# Patient Record
Sex: MALE | Race: WHITE | NOT HISPANIC OR LATINO | Employment: OTHER | URBAN - METROPOLITAN AREA
[De-identification: names, ages, dates, MRNs, and addresses within clinical notes are randomized per-mention and may not be internally consistent; named-entity substitution may affect disease eponyms.]

---

## 2018-01-29 ENCOUNTER — OFFICE VISIT (OUTPATIENT)
Dept: FAMILY MEDICINE | Facility: OTHER | Age: 83
End: 2018-01-29
Attending: NURSE PRACTITIONER
Payer: MEDICARE

## 2018-01-29 VITALS
DIASTOLIC BLOOD PRESSURE: 80 MMHG | SYSTOLIC BLOOD PRESSURE: 138 MMHG | WEIGHT: 186 LBS | HEART RATE: 75 BPM | BODY MASS INDEX: 27.55 KG/M2 | HEIGHT: 69 IN | OXYGEN SATURATION: 99 % | TEMPERATURE: 98.2 F

## 2018-01-29 DIAGNOSIS — Z23 NEED FOR VACCINATION: ICD-10-CM

## 2018-01-29 DIAGNOSIS — N40.1 BENIGN PROSTATIC HYPERPLASIA WITH LOWER URINARY TRACT SYMPTOMS, SYMPTOM DETAILS UNSPECIFIED: Primary | ICD-10-CM

## 2018-01-29 DIAGNOSIS — L71.9 ROSACEA: ICD-10-CM

## 2018-01-29 DIAGNOSIS — J01.90 ACUTE SINUSITIS WITH SYMPTOMS GREATER THAN 10 DAYS: ICD-10-CM

## 2018-01-29 PROCEDURE — G0463 HOSPITAL OUTPT CLINIC VISIT: HCPCS | Mod: 25

## 2018-01-29 PROCEDURE — G0009 ADMIN PNEUMOCOCCAL VACCINE: HCPCS | Performed by: NURSE PRACTITIONER

## 2018-01-29 PROCEDURE — 90670 PCV13 VACCINE IM: CPT | Performed by: NURSE PRACTITIONER

## 2018-01-29 PROCEDURE — G0463 HOSPITAL OUTPT CLINIC VISIT: HCPCS

## 2018-01-29 PROCEDURE — 99204 OFFICE O/P NEW MOD 45 MIN: CPT | Performed by: NURSE PRACTITIONER

## 2018-01-29 PROCEDURE — 90471 IMMUNIZATION ADMIN: CPT | Performed by: NURSE PRACTITIONER

## 2018-01-29 RX ORDER — TRIAMCINOLONE ACETONIDE 55 UG/1
2 SPRAY, METERED NASAL DAILY
Qty: 1 BOTTLE | Refills: 3 | Status: SHIPPED | OUTPATIENT
Start: 2018-01-29 | End: 2018-07-06

## 2018-01-29 RX ORDER — FINASTERIDE 5 MG/1
5 TABLET, FILM COATED ORAL
COMMUNITY
Start: 2017-12-11 | End: 2018-07-12

## 2018-01-29 RX ORDER — DOXYCYCLINE 100 MG/1
100 CAPSULE ORAL
COMMUNITY
Start: 2017-09-18 | End: 2022-08-12

## 2018-01-29 RX ORDER — TRETINOIN 0.1 MG/G
GEL TOPICAL
COMMUNITY
Start: 2017-03-30 | End: 2022-10-27

## 2018-01-29 RX ORDER — TAMSULOSIN HYDROCHLORIDE 0.4 MG/1
0.4 CAPSULE ORAL
COMMUNITY
Start: 2017-12-11 | End: 2019-10-23

## 2018-01-29 RX ORDER — CLARITHROMYCIN 250 MG/1
250 TABLET, FILM COATED ORAL 2 TIMES DAILY
Qty: 20 TABLET | Refills: 0 | Status: SHIPPED | OUTPATIENT
Start: 2018-01-29 | End: 2018-02-08

## 2018-01-29 ASSESSMENT — ANXIETY QUESTIONNAIRES
5. BEING SO RESTLESS THAT IT IS HARD TO SIT STILL: NOT AT ALL
2. NOT BEING ABLE TO STOP OR CONTROL WORRYING: NOT AT ALL
GAD7 TOTAL SCORE: 0
1. FEELING NERVOUS, ANXIOUS, OR ON EDGE: NOT AT ALL
3. WORRYING TOO MUCH ABOUT DIFFERENT THINGS: NOT AT ALL
7. FEELING AFRAID AS IF SOMETHING AWFUL MIGHT HAPPEN: NOT AT ALL
6. BECOMING EASILY ANNOYED OR IRRITABLE: NOT AT ALL

## 2018-01-29 ASSESSMENT — PAIN SCALES - GENERAL: PAINLEVEL: NO PAIN (0)

## 2018-01-29 ASSESSMENT — PATIENT HEALTH QUESTIONNAIRE - PHQ9: 5. POOR APPETITE OR OVEREATING: NOT AT ALL

## 2018-01-29 NOTE — PATIENT INSTRUCTIONS
ASSESSMENT/PLAN:       1. Benign prostatic hyperplasia with lower urinary tract symptoms, symptom details unspecified  Continue following with Dr Gold    2. Rosacea  Continue following with dermatology    3. Acute sinusitis with symptoms greater than 10 days  symptomatic  - clarithromycin (BIAXIN) 250 MG tablet; Take 1 tablet (250 mg) by mouth 2 times daily for 10 days  Dispense: 20 tablet; Refill: 0  - triamcinolone (NASACORT AQ) 55 MCG/ACT Inhaler; Spray 2 sprays into both nostrils daily  Dispense: 1 Bottle; Refill: 3    prevnar 13 updated today    FUTURE APPOINTMENTS:       - Follow-up visit as needed    Eunice Dias, NP  [facility]

## 2018-01-29 NOTE — MR AVS SNAPSHOT
After Visit Summary   1/29/2018    Enzo Colin    MRN: [id number]           Patient Information     Date Of Birth          10/10/1934        Visit Information        Provider Department      1/29/2018 3:30 PM Eunice Dias NP [facility]        Today's Diagnoses     Benign prostatic hyperplasia with lower urinary tract symptoms, symptom details unspecified    -  1    Rosacea        Acute sinusitis with symptoms greater than 10 days          Care Instructions      ASSESSMENT/PLAN:       1. Benign prostatic hyperplasia with lower urinary tract symptoms, symptom details unspecified  Continue following with Dr Gold    2. Rosacea  Continue following with dermatology    3. Acute sinusitis with symptoms greater than 10 days  symptomatic  - clarithromycin (BIAXIN) 250 MG tablet; Take 1 tablet (250 mg) by mouth 2 times daily for 10 days  Dispense: 20 tablet; Refill: 0  - triamcinolone (NASACORT AQ) 55 MCG/ACT Inhaler; Spray 2 sprays into both nostrils daily  Dispense: 1 Bottle; Refill: 3    prevnar 13 updated today    FUTURE APPOINTMENTS:       - Follow-up visit as needed    Eunice Dias NP  [facility]            Follow-ups after your visit        Who to contact     If you have questions or need follow up information about today's clinic visit or your schedule please contact [facility] directly at [phone].  Normal or non-critical lab and imaging results will be communicated to you by MyChart, letter or phone within 4 business days after the clinic has received the results. If you do not hear from us within 7 days, please contact the clinic through MyChart or phone. If you have a critical or abnormal lab result, we will notify you by phone as soon as possible.  Submit refill requests through [employer] or call your pharmacy and they will forward the refill request to us. Please allow 3 business days for your refill to be completed.           "Additional Information About Your Visit        MyChart Information     [employer] lets you send messages to your doctor, view your test results, renew your prescriptions, schedule appointments and more. To sign up, go to [email or web address] . Click on \"Log in\" on the left side of the screen, which will take you to the Welcome page. Then click on \"Sign up Now\" on the right side of the page.     You will be asked to enter the access code listed below, as well as some personal information. Please follow the directions to create your username and password.     Your access code is: [email or web address]  Expires: 2018  3:48 PM     Your access code will  in 90 days. If you need help or a new code, please call your [place] clinic or [phone].        Care EveryWhere ID     This is your Care EveryWhere ID. This could be used by other organizations to access your [place] medical records  [id number]        Your Vitals Were     Pulse Temperature Height Pulse Oximetry BMI (Body Mass Index)       75 98.2  F (36.8  C) (Tympanic) 5' 9\" (1.753 m) 99% 27.47 kg/m2        Blood Pressure from Last 3 Encounters:   18 138/80   14 142/80    Weight from Last 3 Encounters:   18 186 lb (84.4 kg)   14 187 lb (84.8 kg)              Today, you had the following     No orders found for display         Today's Medication Changes          These changes are accurate as of 18  3:48 PM.  If you have any questions, ask your nurse or doctor.               Start taking these medicines.        Dose/Directions    clarithromycin 250 MG tablet   Commonly known as:  BIAXIN   Used for:  Acute sinusitis with symptoms greater than 10 days   Started by:  Eunice Dias NP        Dose:  250 mg   Take 1 tablet (250 mg) by mouth 2 times daily for 10 days   Quantity:  20 tablet   Refills:  0       triamcinolone 55 MCG/ACT Inhaler   Commonly known as:  NASACORT AQ   Used for:  Acute sinusitis with symptoms greater " than 10 days   Started by:  Eunice Dias, NP        Dose:  2 spray   Spray 2 sprays into both nostrils daily   Quantity:  1 Bottle   Refills:  3            Where to get your medicines      These medications were sent to [facility] - [place], [place] - [address]  [address] [address]     Phone:  [phone]     clarithromycin 250 MG tablet    triamcinolone 55 MCG/ACT Inhaler                Primary Care Provider    None Specified       No primary provider on file.        Equal Access to Services     [facility]: Hadii aad ku hadasho Soomaali, waaxda luqadaha, qaybta kaalmada adeegyada, waxay idiin hayaan soni worrell . So [facility] [phone].    ATENCIÓN: Si habla español, tiene a rajan disposición servicios gratuitos de asistencia lingüística. Llame al [phone].    We comply with applicable federal civil rights laws and [place] laws. We do not discriminate on the basis of race, color, national origin, age, disability, sex, sexual orientation, or gender identity.            Thank you!     Thank you for choosing [facility]  for your care. Our goal is always to provide you with excellent care. Hearing back from our patients is one way we can continue to improve our services. Please take a few minutes to complete the written survey that you may receive in the mail after your visit with us. Thank you!             Your Updated Medication List - Protect others around you: Learn how to safely use, store and throw away your medicines at www.disposemymeds.org.          This list is accurate as of 1/29/18  3:48 PM.  Always use your most recent med list.                   Brand Name Dispense Instructions for use Diagnosis    clarithromycin 250 MG tablet    BIAXIN    20 tablet    Take 1 tablet (250 mg) by mouth 2 times daily for 10 days    Acute sinusitis with symptoms greater than 10 days       doxycycline monohydrate 100 MG capsule      Take 100 mg by mouth        finasteride 5 MG  tablet    PROSCAR     Take 5 mg by mouth        METROCREAM 0.75 % cream   Generic drug:  metroNIDAZOLE      Apply by topical route 2 time every day a thin layer to the affected areas in the morning and evening        tamsulosin 0.4 MG capsule    FLOMAX     Take 0.4 mg by mouth        tretinoin 0.01 % topical gel    RETIN-A          triamcinolone 55 MCG/ACT Inhaler    NASACORT AQ    1 Bottle    Spray 2 sprays into both nostrils daily    Acute sinusitis with symptoms greater than 10 days       VITAMIN B-12 SL      Vitamin B Complex - Vitamin B12 1,200mcg/mL Sublingual Drops, take one po daily

## 2018-01-29 NOTE — PROGRESS NOTES
SUBJECTIVE:   Enzo Colin is a 83 year old male who presents to clinic today for the following health issues:      Acute Illness   Acute illness concerns: Sinus Infection  Onset: 3 weeks    Fever: no     Chills/Sweats: no     Headache (location?): YES- forehead and eyes    Sinus Pressure:YES-     Conjunctivitis:  no    Ear Pain: no    Rhinorrhea: YES but stopped 2 days ago.    Congestion: no     Sore Throat: no      Cough: YES-productive of clear sputum but lately not a productive cough    Wheeze: no     Decreased Appetite: no     Nausea: no     Vomiting: no     Diarrhea:  no     Dysuria/Freq.: no     Fatigue/Achiness: YES    Sick/Strep Exposure: no     Therapies Tried and outcome: aspirin, not helping. Helped the headache but didn't last long.    He has been taking doxy 100mg daily for skin issues.  He follows with Dermatology and urology - Dr Gold for prostate hypertrophy.  He does not have a PCP and requested we follow him.  He did bring records in the form of an after visit summary from [facility], these records are reviewed.      Problem list and histories reviewed & adjusted, as indicated.  Additional history: as documented    Patient Active Problem List   Diagnosis     Advanced care planning/counseling discussion     Past Surgical History:   Procedure Laterality Date     discectomy  1986     open reduction internal fixation  2012    Rotator cuff tear (right)     surgical  1996    Bladder cancer       Social History   Substance Use Topics     Smoking status: Former Smoker     Types: Cigarettes     Smokeless tobacco: Former User      Comment: tried to quit yes, yr quit 1977     Alcohol use No     Family History   Problem Relation Age of Onset     DIABETES No family hx of      Hypertension No family hx of      Coronary Artery Disease No family hx of          Current Outpatient Prescriptions   Medication Sig Dispense Refill     doxycycline monohydrate 100 MG capsule Take 100 mg by mouth       finasteride  "(PROSCAR) 5 MG tablet Take 5 mg by mouth       tamsulosin (FLOMAX) 0.4 MG capsule Take 0.4 mg by mouth       tretinoin (RETIN-A) 0.01 % topical gel        metroNIDAZOLE (METROCREAM) 0.75 % cream Apply by topical route 2 time every day a thin layer to the affected areas in the morning and evening       Cyanocobalamin (VITAMIN B-12 SL) Vitamin B Complex - Vitamin B12 1,200mcg/mL Sublingual Drops, take one po daily       Allergies   Allergen Reactions     Amoxicillin Palpitations     Clindamycin Rash     Sulfa Drugs Rash     Sulfa(Sulfonamide Antibiotics)       No lab results found.   BP Readings from Last 3 Encounters:   01/29/18 138/80   06/12/14 142/80    Wt Readings from Last 3 Encounters:   01/29/18 186 lb (84.4 kg)   06/12/14 187 lb (84.8 kg)               Reviewed and updated as needed this visit by clinical staff  Tobacco  Allergies  Meds  Med Hx  Surg Hx  Fam Hx  Soc Hx      Reviewed and updated as needed this visit by Provider         ROS:  Constitutional, HEENT, cardiovascular, pulmonary, gi and gu systems are negative, except as otherwise noted.    OBJECTIVE:     /80 (BP Location: Left arm, Patient Position: Chair, Cuff Size: Adult Large)  Pulse 75  Temp 98.2  F (36.8  C) (Tympanic)  Ht 5' 9\" (1.753 m)  Wt 186 lb (84.4 kg)  SpO2 99%  BMI 27.47 kg/m2  Body mass index is 27.47 kg/(m^2).  GENERAL: alert and no distress, appears younger than stated age  HENT: normal cephalic/atraumatic, both ears: dull, nose and mouth without ulcers or lesions, nasal mucosa edematous , rhinorrhea thick and purulent and oral mucous membranes moist, throat with thick post nasap drip with cobblestoning.    NECK: no adenopathy, no asymmetry, masses, or scars and thyroid normal to palpation  RESP: lungs clear to auscultation - no rales, rhonchi or wheezes  CV: regular rate and rhythm, normal S1 S2, no S3 or S4, no murmur, click or rub, no peripheral edema and peripheral pulses strong  MS: no gross musculoskeletal " defects noted, no edema  PSYCH: mentation appears normal, affect normal/bright        ASSESSMENT/PLAN:       1. Benign prostatic hyperplasia with lower urinary tract symptoms, symptom details unspecified  Continue following with Dr Gold    2. Rosacea  Continue following with dermatology    3. Acute sinusitis with symptoms greater than 10 days  symptomatic  - clarithromycin (BIAXIN) 250 MG tablet; Take 1 tablet (250 mg) by mouth 2 times daily for 10 days  Dispense: 20 tablet; Refill: 0  - triamcinolone (NASACORT AQ) 55 MCG/ACT Inhaler; Spray 2 sprays into both nostrils daily  Dispense: 1 Bottle; Refill: 3    prevnar 13 updated today    FUTURE APPOINTMENTS:       - Follow-up visit as needed    Eunice Dias, NP  [facility]

## 2018-01-29 NOTE — NURSING NOTE
"Chief Complaint   Patient presents with     Sinus Problem     possible sinus infection       Initial /80 (BP Location: Left arm, Patient Position: Chair, Cuff Size: Adult Large)  Pulse 75  Temp 98.2  F (36.8  C) (Tympanic)  Ht 5' 9\" (1.753 m)  Wt 186 lb (84.4 kg)  SpO2 99%  BMI 27.47 kg/m2 Estimated body mass index is 27.47 kg/(m^2) as calculated from the following:    Height as of this encounter: 5' 9\" (1.753 m).    Weight as of this encounter: 186 lb (84.4 kg).  Medication Reconciliation: complete   Kayce Valles LPN    "

## 2018-01-30 ASSESSMENT — ANXIETY QUESTIONNAIRES: GAD7 TOTAL SCORE: 0

## 2018-01-30 ASSESSMENT — PATIENT HEALTH QUESTIONNAIRE - PHQ9: SUM OF ALL RESPONSES TO PHQ QUESTIONS 1-9: 0

## 2018-04-11 ENCOUNTER — OFFICE VISIT (OUTPATIENT)
Dept: FAMILY MEDICINE | Facility: OTHER | Age: 83
End: 2018-04-11
Attending: NURSE PRACTITIONER
Payer: MEDICARE

## 2018-04-11 VITALS
RESPIRATION RATE: 18 BRPM | HEART RATE: 76 BPM | TEMPERATURE: 98.4 F | OXYGEN SATURATION: 99 % | HEIGHT: 69 IN | BODY MASS INDEX: 28.44 KG/M2 | WEIGHT: 192 LBS | SYSTOLIC BLOOD PRESSURE: 148 MMHG | DIASTOLIC BLOOD PRESSURE: 74 MMHG

## 2018-04-11 DIAGNOSIS — F41.9 ANXIETY: Primary | ICD-10-CM

## 2018-04-11 PROCEDURE — 99213 OFFICE O/P EST LOW 20 MIN: CPT | Performed by: NURSE PRACTITIONER

## 2018-04-11 PROCEDURE — G0463 HOSPITAL OUTPT CLINIC VISIT: HCPCS

## 2018-04-11 RX ORDER — LORAZEPAM 0.5 MG/1
TABLET ORAL
Qty: 6 TABLET | Refills: 0 | Status: SHIPPED | OUTPATIENT
Start: 2018-04-11 | End: 2018-06-07

## 2018-04-11 ASSESSMENT — ANXIETY QUESTIONNAIRES
1. FEELING NERVOUS, ANXIOUS, OR ON EDGE: NOT AT ALL
7. FEELING AFRAID AS IF SOMETHING AWFUL MIGHT HAPPEN: NOT AT ALL
GAD7 TOTAL SCORE: 0
2. NOT BEING ABLE TO STOP OR CONTROL WORRYING: NOT AT ALL
4. TROUBLE RELAXING: NOT AT ALL
IF YOU CHECKED OFF ANY PROBLEMS ON THIS QUESTIONNAIRE, HOW DIFFICULT HAVE THESE PROBLEMS MADE IT FOR YOU TO DO YOUR WORK, TAKE CARE OF THINGS AT HOME, OR GET ALONG WITH OTHER PEOPLE: NOT DIFFICULT AT ALL
5. BEING SO RESTLESS THAT IT IS HARD TO SIT STILL: NOT AT ALL
3. WORRYING TOO MUCH ABOUT DIFFERENT THINGS: NOT AT ALL
6. BECOMING EASILY ANNOYED OR IRRITABLE: NOT AT ALL

## 2018-04-11 ASSESSMENT — PAIN SCALES - GENERAL: PAINLEVEL: NO PAIN (0)

## 2018-04-11 NOTE — MR AVS SNAPSHOT
"              After Visit Summary   4/11/2018    Enzo Colin    MRN: [id number]           Patient Information     Date Of Birth          10/10/1934        Visit Information        Provider Department      4/11/2018 11:30 AM Eunice Dias NP [facility]        Today's Diagnoses     Anxiety    -  1      Care Instructions      ASSESSMENT/PLAN:       1. Anxiety  - LORazepam (ATIVAN) 0.5 MG tablet; Take one - two tablets as needed for anxiety Do not operate a vehicle after taking this medication  Dispense: 6 tablet; Refill: 0    FUTURE APPOINTMENTS:       - Follow-up visit as needed for acute concerns.     Eunice Dias NP  [facility]          Follow-ups after your visit        Who to contact     If you have questions or need follow up information about today's clinic visit or your schedule please contact [facility] directly at [phone].  Normal or non-critical lab and imaging results will be communicated to you by MyChart, letter or phone within 4 business days after the clinic has received the results. If you do not hear from us within 7 days, please contact the clinic through [employer] or phone. If you have a critical or abnormal lab result, we will notify you by phone as soon as possible.  Submit refill requests through [employer] or call your pharmacy and they will forward the refill request to us. Please allow 3 business days for your refill to be completed.          Additional Information About Your Visit        MyChart Information     [employer] lets you send messages to your doctor, view your test results, renew your prescriptions, schedule appointments and more. To sign up, go to [email or web address] . Click on \"Log in\" on the left side of the screen, which will take you to the Welcome page. Then click on \"Sign up Now\" on the right side of the page.     You will be asked to enter the access code listed below, as well as some personal information. " "Please follow the directions to create your username and password.     Your access code is: [email or web address]  Expires: 2018  4:48 PM     Your access code will  in 90 days. If you need help or a new code, please call your [place] clinic or [phone].        Care EveryWhere ID     This is your Care EveryWhere ID. This could be used by other organizations to access your [place] medical records  [id number]        Your Vitals Were     Pulse Temperature Respirations Height Pulse Oximetry BMI (Body Mass Index)    76 98.4  F (36.9  C) (Tympanic) 18 5' 9\" (1.753 m) 99% 28.35 kg/m2       Blood Pressure from Last 3 Encounters:   18 148/74   18 138/80   14 142/80    Weight from Last 3 Encounters:   18 192 lb (87.1 kg)   18 186 lb (84.4 kg)   14 187 lb (84.8 kg)              Today, you had the following     No orders found for display         Today's Medication Changes          These changes are accurate as of 18 11:48 AM.  If you have any questions, ask your nurse or doctor.               Start taking these medicines.        Dose/Directions    LORazepam 0.5 MG tablet   Commonly known as:  ATIVAN   Used for:  Anxiety   Started by:  Eunice Dias NP        Take one - two tablets as needed for anxiety Do not operate a vehicle after taking this medication   Quantity:  6 tablet   Refills:  0            Where to get your medicines      Some of these will need a paper prescription and others can be bought over the counter.  Ask your nurse if you have questions.     Bring a paper prescription for each of these medications     LORazepam 0.5 MG tablet                Primary Care Provider Office Phone # Fax #    Eunice Dias [phone] [address] [facility] [address]        Equal Access to Services     [facility]: Bibi espinoo Soomaali, waaxda luqadaha, qaybta kaalmabronson emmanuel, lesly shafer " lizcesiliatatyana clarkaan ah. So [facility] [phone].    ATENCIÓN: Si jazmin dos santos, tiene a rajan disposición servicios gratuitos de asistencia lingüística. Rach alcantara [phone].    We comply with applicable federal civil rights laws and [place] laws. We do not discriminate on the basis of race, color, national origin, age, disability, sex, sexual orientation, or gender identity.            Thank you!     Thank you for choosing [facility]  for your care. Our goal is always to provide you with excellent care. Hearing back from our patients is one way we can continue to improve our services. Please take a few minutes to complete the written survey that you may receive in the mail after your visit with us. Thank you!             Your Updated Medication List - Protect others around you: Learn how to safely use, store and throw away your medicines at www.disposemymeds.org.          This list is accurate as of 4/11/18 11:48 AM.  Always use your most recent med list.                   Brand Name Dispense Instructions for use Diagnosis    doxycycline monohydrate 100 MG capsule      Take 100 mg by mouth        finasteride 5 MG tablet    PROSCAR     Take 5 mg by mouth        LORazepam 0.5 MG tablet    ATIVAN    6 tablet    Take one - two tablets as needed for anxiety Do not operate a vehicle after taking this medication    Anxiety       METROCREAM 0.75 % cream   Generic drug:  metroNIDAZOLE      Apply by topical route 2 time every day a thin layer to the affected areas in the morning and evening        tamsulosin 0.4 MG capsule    FLOMAX     Take 0.4 mg by mouth        tretinoin 0.01 % topical gel    RETIN-A          triamcinolone 55 MCG/ACT Inhaler    NASACORT AQ    1 Bottle    Spray 2 sprays into both nostrils daily    Acute sinusitis with symptoms greater than 10 days       VITAMIN B-12 SL      Vitamin B Complex - Vitamin B12 1,200mcg/mL Sublingual Drops, take one po daily

## 2018-04-11 NOTE — NURSING NOTE
"Chief Complaint   Patient presents with     Anxiety       Initial /74 (BP Location: Right arm, Patient Position: Chair, Cuff Size: Adult Large)  Pulse 76  Temp 98.4  F (36.9  C) (Tympanic)  Resp 18  Ht 5' 9\" (1.753 m)  Wt 192 lb (87.1 kg)  SpO2 99%  BMI 28.35 kg/m2 Estimated body mass index is 28.35 kg/(m^2) as calculated from the following:    Height as of this encounter: 5' 9\" (1.753 m).    Weight as of this encounter: 192 lb (87.1 kg).  Medication Reconciliation: complete   Pamela M Lechevalier LPN      "

## 2018-04-11 NOTE — PATIENT INSTRUCTIONS
ASSESSMENT/PLAN:       1. Anxiety  - LORazepam (ATIVAN) 0.5 MG tablet; Take one - two tablets as needed for anxiety Do not operate a vehicle after taking this medication  Dispense: 6 tablet; Refill: 0    FUTURE APPOINTMENTS:       - Follow-up visit as needed for acute concerns.     Eunice Dias NP  [facility]

## 2018-04-11 NOTE — PROGRESS NOTES
SUBJECTIVE:   Enzo Colin is a 83 year old male who presents to clinic today for the following health issues:  Anxiety symptoms arising     Anxiety Follow-Up    Status since last visit: not exibiting now but has thought going to a [facility] event and is afraid of having a panic attack or episode     Other associated symptoms:lays in bed thinking about having and anxiety attack    Complicating factors:   Significant life event: Yes-  Going to an event    Current substance abuse: None  Depression symptoms: No  RUMA-7 SCORE 1/29/2018 4/11/2018   Total Score 0 0       RUMA-7    Amount of exercise or physical activity: 6-7 days/week for an average of 30-45 minutes    Problems taking medications regularly: No    Medication side effects: none    Diet: regular (no restrictions)      Problem list and histories reviewed & adjusted, as indicated.  Additional history: as documented    Patient Active Problem List   Diagnosis     Advanced care planning/counseling discussion     Benign prostatic hyperplasia with lower urinary tract symptoms, symptom details unspecified     Rosacea     Past Surgical History:   Procedure Laterality Date     discectomy  1986     open reduction internal fixation  2012    Rotator cuff tear (right)     surgical  1996    Bladder cancer       Social History   Substance Use Topics     Smoking status: Former Smoker     Types: Cigarettes     Smokeless tobacco: Former User      Comment: tried to quit yes, yr quit 1977     Alcohol use No     Family History   Problem Relation Age of Onset     DIABETES No family hx of      Hypertension No family hx of      Coronary Artery Disease No family hx of          Current Outpatient Prescriptions   Medication Sig Dispense Refill     doxycycline monohydrate 100 MG capsule Take 100 mg by mouth       finasteride (PROSCAR) 5 MG tablet Take 5 mg by mouth       tamsulosin (FLOMAX) 0.4 MG capsule Take 0.4 mg by mouth       tretinoin (RETIN-A) 0.01 % topical gel        triamcinolone  "(NASACORT AQ) 55 MCG/ACT Inhaler Spray 2 sprays into both nostrils daily 1 Bottle 3     metroNIDAZOLE (METROCREAM) 0.75 % cream Apply by topical route 2 time every day a thin layer to the affected areas in the morning and evening       Cyanocobalamin (VITAMIN B-12 SL) Vitamin B Complex - Vitamin B12 1,200mcg/mL Sublingual Drops, take one po daily       Allergies   Allergen Reactions     Amoxicillin Palpitations     Clindamycin Rash     Sulfa Drugs Rash     Sulfa(Sulfonamide Antibiotics)       No lab results found.   BP Readings from Last 3 Encounters:   04/11/18 148/74   01/29/18 138/80   06/12/14 142/80    Wt Readings from Last 3 Encounters:   04/11/18 192 lb (87.1 kg)   01/29/18 186 lb (84.4 kg)   06/12/14 187 lb (84.8 kg)                 Reviewed and updated as needed this visit by clinical staff  Tobacco  Allergies       Reviewed and updated as needed this visit by Provider         ROS:  Constitutional, HEENT, cardiovascular, pulmonary, gi and gu systems are negative, except as otherwise noted.    OBJECTIVE:     /74 (BP Location: Right arm, Patient Position: Chair, Cuff Size: Adult Large)  Pulse 76  Temp 98.4  F (36.9  C) (Tympanic)  Resp 18  Ht 5' 9\" (1.753 m)  Wt 192 lb (87.1 kg)  SpO2 99%  BMI 28.35 kg/m2  Body mass index is 28.35 kg/(m^2).  GENERAL: healthy, alert and no distress  RESP: lungs clear to auscultation - no rales, rhonchi or wheezes  CV: regular rate and rhythm, normal S1 S2, no S3 or S4, no murmur, click or rub, no peripheral edema and peripheral pulses strong  MS: no gross musculoskeletal defects noted, no edema  NEURO: Normal strength and tone, mentation intact and speech normal  PSYCH: mentation appears normal, affect normal/bright      ASSESSMENT/PLAN:       1. Anxiety  - LORazepam (ATIVAN) 0.5 MG tablet; Take one - two tablets as needed for anxiety Do not operate a vehicle after taking this medication  Dispense: 6 tablet; Refill: 0    FUTURE APPOINTMENTS:       - Follow-up " visit as needed for acute concerns.     Eunice Dias, NP  [facility]

## 2018-04-12 ASSESSMENT — ANXIETY QUESTIONNAIRES: GAD7 TOTAL SCORE: 0

## 2018-04-12 ASSESSMENT — PATIENT HEALTH QUESTIONNAIRE - PHQ9: SUM OF ALL RESPONSES TO PHQ QUESTIONS 1-9: 0

## 2018-06-07 ENCOUNTER — OFFICE VISIT (OUTPATIENT)
Dept: FAMILY MEDICINE | Facility: OTHER | Age: 83
End: 2018-06-07
Attending: NURSE PRACTITIONER
Payer: MEDICARE

## 2018-06-07 VITALS
DIASTOLIC BLOOD PRESSURE: 80 MMHG | HEART RATE: 74 BPM | BODY MASS INDEX: 28.14 KG/M2 | WEIGHT: 190 LBS | RESPIRATION RATE: 16 BRPM | OXYGEN SATURATION: 98 % | HEIGHT: 69 IN | SYSTOLIC BLOOD PRESSURE: 142 MMHG | TEMPERATURE: 98.3 F

## 2018-06-07 DIAGNOSIS — R93.1 ABNORMAL ECHOCARDIOGRAM: ICD-10-CM

## 2018-06-07 DIAGNOSIS — I10 BENIGN ESSENTIAL HYPERTENSION: ICD-10-CM

## 2018-06-07 DIAGNOSIS — R06.02 SHORTNESS OF BREATH: Primary | ICD-10-CM

## 2018-06-07 DIAGNOSIS — R01.1 HEART MURMUR: ICD-10-CM

## 2018-06-07 LAB
BASOPHILS # BLD AUTO: 0 10E9/L (ref 0–0.2)
BASOPHILS NFR BLD AUTO: 0.1 %
DIFFERENTIAL METHOD BLD: NORMAL
EOSINOPHIL # BLD AUTO: 0.1 10E9/L (ref 0–0.7)
EOSINOPHIL NFR BLD AUTO: 1.8 %
ERYTHROCYTE [DISTWIDTH] IN BLOOD BY AUTOMATED COUNT: 14.5 % (ref 10–15)
HCT VFR BLD AUTO: 43.2 % (ref 40–53)
HGB BLD-MCNC: 14.1 G/DL (ref 13.3–17.7)
LYMPHOCYTES # BLD AUTO: 1.6 10E9/L (ref 0.8–5.3)
LYMPHOCYTES NFR BLD AUTO: 21 %
MCH RBC QN AUTO: 30.1 PG (ref 26.5–33)
MCHC RBC AUTO-ENTMCNC: 32.6 G/DL (ref 31.5–36.5)
MCV RBC AUTO: 92 FL (ref 78–100)
MONOCYTES # BLD AUTO: 0.7 10E9/L (ref 0–1.3)
MONOCYTES NFR BLD AUTO: 9.4 %
NEUTROPHILS # BLD AUTO: 5.1 10E9/L (ref 1.6–8.3)
NEUTROPHILS NFR BLD AUTO: 67.7 %
PLATELET # BLD AUTO: 180 10E9/L (ref 150–450)
RBC # BLD AUTO: 4.68 10E12/L (ref 4.4–5.9)
WBC # BLD AUTO: 7.6 10E9/L (ref 4–11)

## 2018-06-07 PROCEDURE — G0463 HOSPITAL OUTPT CLINIC VISIT: HCPCS

## 2018-06-07 PROCEDURE — 36415 COLL VENOUS BLD VENIPUNCTURE: CPT | Mod: ZL | Performed by: NURSE PRACTITIONER

## 2018-06-07 PROCEDURE — 93010 ELECTROCARDIOGRAM REPORT: CPT | Performed by: INTERNAL MEDICINE

## 2018-06-07 PROCEDURE — 85025 COMPLETE CBC W/AUTO DIFF WBC: CPT | Mod: ZL | Performed by: NURSE PRACTITIONER

## 2018-06-07 PROCEDURE — G0463 HOSPITAL OUTPT CLINIC VISIT: HCPCS | Mod: 25

## 2018-06-07 PROCEDURE — 99214 OFFICE O/P EST MOD 30 MIN: CPT | Performed by: NURSE PRACTITIONER

## 2018-06-07 PROCEDURE — 93005 ELECTROCARDIOGRAM TRACING: CPT

## 2018-06-07 PROCEDURE — 80048 BASIC METABOLIC PNL TOTAL CA: CPT | Mod: ZL | Performed by: NURSE PRACTITIONER

## 2018-06-07 RX ORDER — LISINOPRIL 5 MG/1
5 TABLET ORAL DAILY
Qty: 90 TABLET | Refills: 1 | Status: SHIPPED | OUTPATIENT
Start: 2018-06-07 | End: 2018-07-12

## 2018-06-07 ASSESSMENT — ANXIETY QUESTIONNAIRES
3. WORRYING TOO MUCH ABOUT DIFFERENT THINGS: NOT AT ALL
7. FEELING AFRAID AS IF SOMETHING AWFUL MIGHT HAPPEN: NOT AT ALL
GAD7 TOTAL SCORE: 0
5. BEING SO RESTLESS THAT IT IS HARD TO SIT STILL: NOT AT ALL
6. BECOMING EASILY ANNOYED OR IRRITABLE: NOT AT ALL
1. FEELING NERVOUS, ANXIOUS, OR ON EDGE: NOT AT ALL
4. TROUBLE RELAXING: NOT AT ALL
2. NOT BEING ABLE TO STOP OR CONTROL WORRYING: NOT AT ALL

## 2018-06-07 ASSESSMENT — PAIN SCALES - GENERAL: PAINLEVEL: NO PAIN (0)

## 2018-06-07 NOTE — PROGRESS NOTES
SUBJECTIVE:   Enzo Colin is a 83 year old male who presents to clinic today for the following health issues:      Acute Illness   Acute illness concerns: SOB  Insomnia and cough  Onset: 3 weeks    Fever: no    Chills/Sweats: no    Headache (location?): YES    Sinus Pressure:YES    Conjunctivitis:  no    Ear Pain: no    Rhinorrhea: YES    Congestion: YES    Sore Throat: no     Cough: YES-productive of clear sputum    Wheeze: YES    Decreased Appetite: no    Nausea: no    Vomiting: no    Diarrhea:  no    Dysuria/Freq.: no    Fatigue/Achiness: YES  Due to insomnia concerns and frequesnt waking up at night (BPH), last night woke up and may have had SOB or could have been dreaming this , walked to the kitchen and felt better    Sick/Strep Exposure: no     Therapies Tried and outcome: allergy cough syrup, some help and ASA      His wife and daughter are concerned as he is more fatigued, increased shortness of breath with exertion.  Reports he has been more unsteady on his feet.      Problem list and histories reviewed & adjusted, as indicated.  Additional history: as documented    Patient Active Problem List   Diagnosis     Advanced care planning/counseling discussion     Benign prostatic hyperplasia with lower urinary tract symptoms, symptom details unspecified     Rosacea     Past Surgical History:   Procedure Laterality Date     discectomy  1986     open reduction internal fixation  2012    Rotator cuff tear (right)     surgical  1996    Bladder cancer       Social History   Substance Use Topics     Smoking status: Former Smoker     Types: Cigarettes     Smokeless tobacco: Former User      Comment: tried to quit yes, yr quit 1977     Alcohol use No     Family History   Problem Relation Age of Onset     DIABETES No family hx of      Hypertension No family hx of      Coronary Artery Disease No family hx of          Current Outpatient Prescriptions   Medication Sig Dispense Refill     Cyanocobalamin (VITAMIN B-12 SL)  "Vitamin B Complex - Vitamin B12 1,200mcg/mL Sublingual Drops, take one po daily       doxycycline monohydrate 100 MG capsule Take 100 mg by mouth       finasteride (PROSCAR) 5 MG tablet Take 5 mg by mouth       LORazepam (ATIVAN) 0.5 MG tablet Take one - two tablets as needed for anxiety Do not operate a vehicle after taking this medication (Patient not taking: Reported on 6/7/2018) 6 tablet 0     metroNIDAZOLE (METROCREAM) 0.75 % cream Apply by topical route 2 time every day a thin layer to the affected areas in the morning and evening       tamsulosin (FLOMAX) 0.4 MG capsule Take 0.4 mg by mouth       tretinoin (RETIN-A) 0.01 % topical gel        triamcinolone (NASACORT AQ) 55 MCG/ACT Inhaler Spray 2 sprays into both nostrils daily 1 Bottle 3     Allergies   Allergen Reactions     Amoxicillin Palpitations     Clindamycin Rash     Sulfa Drugs Rash     Sulfa(Sulfonamide Antibiotics)       No lab results found.   BP Readings from Last 3 Encounters:   06/07/18 142/80   04/11/18 148/74   01/29/18 138/80    Wt Readings from Last 3 Encounters:   06/07/18 190 lb (86.2 kg)   04/11/18 192 lb (87.1 kg)   01/29/18 186 lb (84.4 kg)                    Reviewed and updated as needed this visit by clinical staff  Tobacco  Allergies  Meds  Problems  Med Hx  Surg Hx  Fam Hx  Soc Hx        Reviewed and updated as needed this visit by Provider         ROS:  Constitutional, HEENT, cardiovascular, pulmonary, gi and gu systems are negative, except as otherwise noted.    OBJECTIVE:     /80 (BP Location: Right arm, Patient Position: Sitting, Cuff Size: Adult Large)  Pulse 74  Temp 98.3  F (36.8  C)  Resp 16  Ht 5' 9\" (1.753 m)  Wt 190 lb (86.2 kg)  SpO2 98%  BMI 28.06 kg/m2  Body mass index is 28.06 kg/(m^2).  GENERAL: healthy, alert and no distress  HENT: ear canals and TM's normal, nose and mouth without ulcers or lesions  NECK: no adenopathy, no asymmetry, masses, or scars and thyroid normal to palpation  RESP: " lungs clear to auscultation - no rales, rhonchi or wheezes  CV: regular rates and rhythm, normal S1 S2, no S3 or S4, grade 3/6 systolic murmur heard best over the aortic region, peripheral pulses strong and no peripheral edema  ABDOMEN: soft, nontender, no hepatosplenomegaly, no masses and bowel sounds normal  PSYCH: mentation appears normal, affect normal/bright    Results for orders placed or performed in visit on 06/07/18   CBC with platelets differential   Result Value Ref Range    WBC 7.6 4.0 - 11.0 10e9/L    RBC Count 4.68 4.4 - 5.9 10e12/L    Hemoglobin 14.1 13.3 - 17.7 g/dL    Hematocrit 43.2 40.0 - 53.0 %    MCV 92 78 - 100 fl    MCH 30.1 26.5 - 33.0 pg    MCHC 32.6 31.5 - 36.5 g/dL    RDW 14.5 10.0 - 15.0 %    Platelet Count 180 150 - 450 10e9/L    Diff Method Automated Method     % Neutrophils 67.7 %    % Lymphocytes 21.0 %    % Monocytes 9.4 %    % Eosinophils 1.8 %    % Basophils 0.1 %    Absolute Neutrophil 5.1 1.6 - 8.3 10e9/L    Absolute Lymphocytes 1.6 0.8 - 5.3 10e9/L    Absolute Monocytes 0.7 0.0 - 1.3 10e9/L    Absolute Eosinophils 0.1 0.0 - 0.7 10e9/L    Absolute Basophils 0.0 0.0 - 0.2 10e9/L          EKG Interpretation:      Interpreted by Eunice Dias     Symptoms at time of EKG: None   Rhythm: Normal sinus  and right bundle branch block  Rate: Normal  Axis: Left Axis Deviation  Ectopy: None  Conduction: Normal  ST Segments/ T Waves: No ST-T wave changes and No acute ischemic changes  Q Waves: None  Comparison to prior: No old EKG available    Clinical Impression: no acute ST changes noted.          ASSESSMENT/PLAN:       1. Shortness of breath  - CBC with platelets differential - normal  - Basic metabolic panel  - EKG 12-lead complete w/read - (Clinic Performed)  - NM Exercise stress test; Future  - Exercise Stress Echocardiogram    2. Benign essential hypertension  New onset  - lisinopril (PRINIVIL/ZESTRIL) 5 MG tablet; Take 1 tablet (5 mg) by mouth daily  Dispense: 90 tablet;  Refill: 1  - please return in one - two weeks for nurse only blood pressure recheck    3. Heart murmur  - Exercise Stress Echocardiogram    FUTURE APPOINTMENTS:       - Follow-up visit in 1-2 weeks    Eunice Dias NP  [facility]

## 2018-06-07 NOTE — MR AVS SNAPSHOT
After Visit Summary   6/7/2018    Enzo Colin    MRN: [id number]           Patient Information     Date Of Birth          10/10/1934        Visit Information        Provider Department      6/7/2018 4:15 PM Eunice Dias NP [facility]        Today's Diagnoses     Shortness of breath    -  1    Benign essential hypertension        Heart murmur          Care Instructions      ASSESSMENT/PLAN:       1. Shortness of breath  - CBC with platelets differential - normal  - Basic metabolic panel  - EKG 12-lead complete w/read - (Clinic Performed)  - NM Exercise stress test; Future  - Exercise Stress Echocardiogram    2. Benign essential hypertension  New onset  - lisinopril (PRINIVIL/ZESTRIL) 5 MG tablet; Take 1 tablet (5 mg) by mouth daily  Dispense: 90 tablet; Refill: 1  - please return in one - two weeks for nurse only blood pressure recheck    3. Heart murmur  - Exercise Stress Echocardiogram    FUTURE APPOINTMENTS:       - Follow-up visit in 1-2 weeks    Eunice Dias NP  [facility]            Follow-ups after your visit        Follow-up notes from your care team     Return if symptoms worsen or fail to improve.      Future tests that were ordered for you today     Open Future Orders        Priority Expected Expires Ordered    NM Exercise stress test Routine  6/7/2019 6/7/2018            Who to contact     If you have questions or need follow up information about today's clinic visit or your schedule please contact [facility] directly at [phone].  Normal or non-critical lab and imaging results will be communicated to you by MyChart, letter or phone within 4 business days after the clinic has received the results. If you do not hear from us within 7 days, please contact the clinic through MyChart or phone. If you have a critical or abnormal lab result, we will notify you by phone as soon as possible.  Submit refill requests through  "[facility] or call your pharmacy and they will forward the refill request to us. Please allow 3 business days for your refill to be completed.          Additional Information About Your Visit        Care EveryWhere ID     This is your Care EveryWhere ID. This could be used by other organizations to access your [facility] medical records  [id number]        Your Vitals Were     Pulse Temperature Respirations Height Pulse Oximetry BMI (Body Mass Index)    74 98.3  F (36.8  C) 16 5' 9\" (1.753 m) 98% 28.06 kg/m2       Blood Pressure from Last 3 Encounters:   06/07/18 142/80   04/11/18 148/74   01/29/18 138/80    Weight from Last 3 Encounters:   06/07/18 190 lb (86.2 kg)   04/11/18 192 lb (87.1 kg)   01/29/18 186 lb (84.4 kg)              We Performed the Following     Basic metabolic panel     CBC with platelets differential     EKG 12-lead complete w/read - (Clinic Performed)     Exercise Stress Echocardiogram          Today's Medication Changes          These changes are accurate as of 6/7/18  4:56 PM.  If you have any questions, ask your nurse or doctor.               Start taking these medicines.        Dose/Directions    lisinopril 5 MG tablet   Commonly known as:  PRINIVIL/ZESTRIL   Used for:  Benign essential hypertension   Started by:  Eunice Dias NP        Dose:  5 mg   Take 1 tablet (5 mg) by mouth daily   Quantity:  90 tablet   Refills:  1            Where to get your medicines      These medications were sent to [facility] - [place], [place] - [address]  [facility] [place] [address]     Phone:  [phone]     lisinopril 5 MG tablet                Primary Care Provider Office Phone # Fax #    Eunice Dias [phone] [facility] [address]        Equal Access to Services     ELIZABETH MENDIOLA AH: Bibi espinoo Soomaali, waaxda luqadaha, qaybta kaalmada adeegyada, waxay elvis de la cruz. So wa " [phone].    ATENCIÓN: Si jazmin dos santos, tiene a rajan disposición servicios gratuitos de asistencia lingüística. Rach alcantara [phone].    We comply with applicable federal civil rights laws and [place] laws. We do not discriminate on the basis of race, color, national origin, age, disability, sex, sexual orientation, or gender identity.            Thank you!     Thank you for choosing [facility]  for your care. Our goal is always to provide you with excellent care. Hearing back from our patients is one way we can continue to improve our services. Please take a few minutes to complete the written survey that you may receive in the mail after your visit with us. Thank you!             Your Updated Medication List - Protect others around you: Learn how to safely use, store and throw away your medicines at www.disposemymeds.org.          This list is accurate as of 6/7/18  4:56 PM.  Always use your most recent med list.                   Brand Name Dispense Instructions for use Diagnosis    doxycycline monohydrate 100 MG capsule      Take 100 mg by mouth        finasteride 5 MG tablet    PROSCAR     Take 5 mg by mouth        lisinopril 5 MG tablet    PRINIVIL/ZESTRIL    90 tablet    Take 1 tablet (5 mg) by mouth daily    Benign essential hypertension       LORazepam 0.5 MG tablet    ATIVAN    6 tablet    Take one - two tablets as needed for anxiety Do not operate a vehicle after taking this medication    Anxiety       METROCREAM 0.75 % cream   Generic drug:  metroNIDAZOLE      Apply by topical route 2 time every day a thin layer to the affected areas in the morning and evening        tamsulosin 0.4 MG capsule    FLOMAX     Take 0.4 mg by mouth        tretinoin 0.01 % topical gel    RETIN-A          triamcinolone 55 MCG/ACT Inhaler    NASACORT AQ    1 Bottle    Spray 2 sprays into both nostrils daily    Acute sinusitis with symptoms greater than 10 days       VITAMIN B-12 SL      Vitamin B Complex -  Vitamin B12 1,200mcg/mL Sublingual Drops, take one po daily

## 2018-06-07 NOTE — NURSING NOTE
"Chief Complaint   Patient presents with     Breathing Problem       Initial /80 (BP Location: Right arm, Patient Position: Sitting, Cuff Size: Adult Large)  Pulse 74  Temp 98.3  F (36.8  C)  Resp 16  Ht 5' 9\" (1.753 m)  Wt 190 lb (86.2 kg)  SpO2 98%  BMI 28.06 kg/m2 Estimated body mass index is 28.06 kg/(m^2) as calculated from the following:    Height as of this encounter: 5' 9\" (1.753 m).    Weight as of this encounter: 190 lb (86.2 kg).  Medication Reconciliation: complete    Emily Meadows LPN    "

## 2018-06-07 NOTE — PATIENT INSTRUCTIONS
ASSESSMENT/PLAN:       1. Shortness of breath  - CBC with platelets differential - normal  - Basic metabolic panel  - EKG 12-lead complete w/read - (Clinic Performed)  - NM Exercise stress test; Future  - Exercise Stress Echocardiogram    2. Benign essential hypertension  New onset  - lisinopril (PRINIVIL/ZESTRIL) 5 MG tablet; Take 1 tablet (5 mg) by mouth daily  Dispense: 90 tablet; Refill: 1  - please return in one - two weeks for nurse only blood pressure recheck    3. Heart murmur  - Exercise Stress Echocardiogram    FUTURE APPOINTMENTS:       - Follow-up visit in 1-2 weeks    Eunice Dias NP  [facility]

## 2018-06-08 ENCOUNTER — TELEPHONE (OUTPATIENT)
Dept: NUCLEAR MEDICINE | Facility: HOSPITAL | Age: 83
End: 2018-06-08

## 2018-06-08 LAB
ANION GAP SERPL CALCULATED.3IONS-SCNC: 6 MMOL/L (ref 3–14)
BUN SERPL-MCNC: 19 MG/DL (ref 7–30)
CALCIUM SERPL-MCNC: 8.8 MG/DL (ref 8.5–10.1)
CHLORIDE SERPL-SCNC: 107 MMOL/L (ref 94–109)
CO2 SERPL-SCNC: 27 MMOL/L (ref 20–32)
CREAT SERPL-MCNC: 1.26 MG/DL (ref 0.66–1.25)
GFR SERPL CREATININE-BSD FRML MDRD: 55 ML/MIN/1.7M2
GLUCOSE SERPL-MCNC: 115 MG/DL (ref 70–99)
POTASSIUM SERPL-SCNC: 4.1 MMOL/L (ref 3.4–5.3)
SODIUM SERPL-SCNC: 140 MMOL/L (ref 133–144)

## 2018-06-08 ASSESSMENT — PATIENT HEALTH QUESTIONNAIRE - PHQ9: SUM OF ALL RESPONSES TO PHQ QUESTIONS 1-9: 2

## 2018-06-08 ASSESSMENT — ANXIETY QUESTIONNAIRES: GAD7 TOTAL SCORE: 0

## 2018-06-08 NOTE — TELEPHONE ENCOUNTER
I need both - stress test for ischemia echo due to new murmur - need to see structure of valves.

## 2018-06-08 NOTE — TELEPHONE ENCOUNTER
Good Morning,     A Treadmill Nuclear Medicine Stress Test and a Stress Echo were ordered for this patient.  Typically, one or the other is performed.     1)  Do you want both stress tests?  2)  If yes, which would you like scheduled first?    Thank you,  Apolonia Ruiz, [facility]

## 2018-06-11 ENCOUNTER — TELEPHONE (OUTPATIENT)
Dept: FAMILY MEDICINE | Facility: OTHER | Age: 83
End: 2018-06-11

## 2018-06-11 DIAGNOSIS — R01.1 NEWLY RECOGNIZED MURMUR: Primary | ICD-10-CM

## 2018-06-11 DIAGNOSIS — R06.09 DYSPNEA ON EXERTION: ICD-10-CM

## 2018-06-15 ENCOUNTER — HOSPITAL ENCOUNTER (OUTPATIENT)
Dept: CARDIOLOGY | Facility: HOSPITAL | Age: 83
Discharge: HOME | End: 2018-06-15
Attending: NURSE PRACTITIONER | Admitting: NURSE PRACTITIONER
Payer: MEDICARE

## 2018-06-15 DIAGNOSIS — R01.1 NEWLY RECOGNIZED MURMUR: ICD-10-CM

## 2018-06-15 PROCEDURE — 93306 TTE W/DOPPLER COMPLETE: CPT | Mod: TC

## 2018-06-15 PROCEDURE — 93306 TTE W/DOPPLER COMPLETE: CPT | Mod: 26 | Performed by: INTERNAL MEDICINE

## 2018-06-18 ENCOUNTER — ALLIED HEALTH/NURSE VISIT (OUTPATIENT)
Dept: FAMILY MEDICINE | Facility: OTHER | Age: 83
End: 2018-06-18
Payer: MEDICARE

## 2018-06-18 VITALS — HEART RATE: 72 BPM | DIASTOLIC BLOOD PRESSURE: 62 MMHG | SYSTOLIC BLOOD PRESSURE: 108 MMHG

## 2018-06-18 DIAGNOSIS — Z01.30 BP CHECK: Primary | ICD-10-CM

## 2018-06-18 PROCEDURE — 99207 ZZC NO CHARGE NURSE ONLY: CPT

## 2018-06-18 RX ORDER — LORAZEPAM 0.5 MG/1
0.5 TABLET ORAL PRN
COMMUNITY
Start: 2018-04-11 | End: 2018-07-02

## 2018-06-19 ENCOUNTER — TELEPHONE (OUTPATIENT)
Dept: FAMILY MEDICINE | Facility: OTHER | Age: 83
End: 2018-06-19

## 2018-06-19 ENCOUNTER — HOSPITAL ENCOUNTER (OUTPATIENT)
Dept: NUCLEAR MEDICINE | Facility: HOSPITAL | Age: 83
Setting detail: NUCLEAR MEDICINE
End: 2018-06-19
Attending: NURSE PRACTITIONER
Payer: MEDICARE

## 2018-06-19 ENCOUNTER — HOSPITAL ENCOUNTER (OUTPATIENT)
Dept: NUCLEAR MEDICINE | Facility: HOSPITAL | Age: 83
Setting detail: NUCLEAR MEDICINE
Discharge: HOME | End: 2018-06-19
Attending: NURSE PRACTITIONER | Admitting: NURSE PRACTITIONER
Payer: MEDICARE

## 2018-06-19 ENCOUNTER — HOSPITAL ENCOUNTER (OUTPATIENT)
Dept: GENERAL RADIOLOGY | Facility: HOSPITAL | Age: 83
Setting detail: NUCLEAR MEDICINE
End: 2018-06-19
Attending: NURSE PRACTITIONER
Payer: MEDICARE

## 2018-06-19 DIAGNOSIS — R06.09 DYSPNEA ON EXERTION: ICD-10-CM

## 2018-06-19 PROCEDURE — 93016 CV STRESS TEST SUPVJ ONLY: CPT | Performed by: INTERNAL MEDICINE

## 2018-06-19 PROCEDURE — 34300033 ZZH RX 343: Performed by: RADIOLOGY

## 2018-06-19 PROCEDURE — 78452 HT MUSCLE IMAGE SPECT MULT: CPT | Mod: TC

## 2018-06-19 PROCEDURE — A9500 TC99M SESTAMIBI: HCPCS | Performed by: RADIOLOGY

## 2018-06-19 PROCEDURE — 93017 CV STRESS TEST TRACING ONLY: CPT

## 2018-06-19 PROCEDURE — 93018 CV STRESS TEST I&R ONLY: CPT | Performed by: INTERNAL MEDICINE

## 2018-06-19 PROCEDURE — 25000128 H RX IP 250 OP 636: Performed by: INTERNAL MEDICINE

## 2018-06-19 RX ORDER — REGADENOSON 0.08 MG/ML
0.4 INJECTION, SOLUTION INTRAVENOUS ONCE
Status: COMPLETED | OUTPATIENT
Start: 2018-06-19 | End: 2018-06-19

## 2018-06-19 RX ADMIN — Medication 10 MILLICURIE: at 07:30

## 2018-06-19 RX ADMIN — REGADENOSON 0.4 MG: 0.08 INJECTION, SOLUTION INTRAVENOUS at 09:35

## 2018-06-19 RX ADMIN — Medication 30 MILLICURIE: at 09:38

## 2018-06-19 NOTE — TELEPHONE ENCOUNTER
2:16 PM    Reason for Call: Phone Call    Description: Pt stated he spoke with nurse earlier regarding his test results. He would like a copy of these to  at . Please call him back at [phone]    Was an appointment offered for this call? No  If yes : Appointment type              Date    Preferred method for responding to this message: Telephone Call  What is your phone number ?    If we cannot reach you directly, may we leave a detailed response at the number you provided? Yes    Can this message wait until your PCP/provider returns, if available today? Not applicable    Marielos Raines

## 2018-07-02 ENCOUNTER — TELEPHONE (OUTPATIENT)
Dept: FAMILY MEDICINE | Facility: OTHER | Age: 83
End: 2018-07-02

## 2018-07-02 DIAGNOSIS — F41.9 ANXIETY: Primary | ICD-10-CM

## 2018-07-02 RX ORDER — LORAZEPAM 0.5 MG/1
0.5 TABLET ORAL EVERY 8 HOURS PRN
Qty: 20 TABLET | Refills: 0 | Status: SHIPPED | OUTPATIENT
Start: 2018-07-02 | End: 2019-08-15

## 2018-07-02 NOTE — TELEPHONE ENCOUNTER
Request for Ativan.Historical in Epic.Last office visit on 6.7.18.Pended-note you need to add a frequency.Thank you.

## 2018-07-02 NOTE — TELEPHONE ENCOUNTER
Called patient and he states that he only needs once in a while for anxiety.Off and on after [place] he has anxiety at times. He really does not use but would like to have incase this occurs.He said does once in a while.

## 2018-07-02 NOTE — TELEPHONE ENCOUNTER
Reason for call:  Medication    1. Medication Name? Lorazepam 0.5 mg  2. Is this request for a refill? Yes  3. What Pharmacy do you use? [facility]   4. Have you contacted your pharmacy? No    5. If yes, when?  (Please note that the turn-around-time for prescriptions is 72 business hours; I am sending your request at this time. SEND TO  [facility]  )  Description: Please fill, last fill was in April.   Was an appointment offered for this a call? No   Preferred method for responding to this messageTelephone Call [phone]  If we cannot reach you directly, may we leave a detailed response at the number you provided? Yes  Can this message wait until your PCP/Provider returns if not available today? No, please fill.      Thank you,

## 2018-07-06 ENCOUNTER — OFFICE VISIT (OUTPATIENT)
Dept: CARDIOLOGY | Facility: OTHER | Age: 83
End: 2018-07-06
Attending: NURSE PRACTITIONER
Payer: MEDICARE

## 2018-07-06 VITALS
TEMPERATURE: 97.6 F | OXYGEN SATURATION: 99 % | HEART RATE: 80 BPM | HEIGHT: 69 IN | DIASTOLIC BLOOD PRESSURE: 88 MMHG | SYSTOLIC BLOOD PRESSURE: 166 MMHG | WEIGHT: 182 LBS | RESPIRATION RATE: 18 BRPM | BODY MASS INDEX: 26.96 KG/M2

## 2018-07-06 DIAGNOSIS — F41.0 PANIC ATTACK: ICD-10-CM

## 2018-07-06 DIAGNOSIS — R01.1 HEART MURMUR: ICD-10-CM

## 2018-07-06 DIAGNOSIS — R06.02 SHORTNESS OF BREATH: Primary | ICD-10-CM

## 2018-07-06 DIAGNOSIS — N18.30 CKD (CHRONIC KIDNEY DISEASE) STAGE 3, GFR 30-59 ML/MIN (H): ICD-10-CM

## 2018-07-06 DIAGNOSIS — I10 ESSENTIAL HYPERTENSION, BENIGN: ICD-10-CM

## 2018-07-06 DIAGNOSIS — Z87.891 HISTORY OF TOBACCO ABUSE: ICD-10-CM

## 2018-07-06 DIAGNOSIS — I45.10 RBBB (RIGHT BUNDLE BRANCH BLOCK): ICD-10-CM

## 2018-07-06 DIAGNOSIS — F41.1 GAD (GENERALIZED ANXIETY DISORDER): ICD-10-CM

## 2018-07-06 DIAGNOSIS — I50.32 CHRONIC DIASTOLIC HEART FAILURE (H): ICD-10-CM

## 2018-07-06 PROBLEM — R06.00 DYSPNEA: Status: ACTIVE | Noted: 2018-07-06

## 2018-07-06 PROBLEM — H35.30 MACULAR DEGENERATION (SENILE) OF RETINA: Status: ACTIVE | Noted: 2018-07-06

## 2018-07-06 PROBLEM — F43.10 PTSD (POST-TRAUMATIC STRESS DISORDER): Status: ACTIVE | Noted: 2018-07-06

## 2018-07-06 PROCEDURE — G0463 HOSPITAL OUTPT CLINIC VISIT: HCPCS

## 2018-07-06 PROCEDURE — G0463 HOSPITAL OUTPT CLINIC VISIT: HCPCS | Mod: 25

## 2018-07-06 PROCEDURE — 93010 ELECTROCARDIOGRAM REPORT: CPT | Performed by: INTERNAL MEDICINE

## 2018-07-06 PROCEDURE — 93005 ELECTROCARDIOGRAM TRACING: CPT

## 2018-07-06 PROCEDURE — 99204 OFFICE O/P NEW MOD 45 MIN: CPT | Performed by: INTERNAL MEDICINE

## 2018-07-06 RX ORDER — LISINOPRIL 10 MG/1
10 TABLET ORAL DAILY
Qty: 90 TABLET | Refills: 3 | Status: SHIPPED | OUTPATIENT
Start: 2018-07-06 | End: 2018-07-12

## 2018-07-06 ASSESSMENT — PAIN SCALES - GENERAL: PAINLEVEL: NO PAIN (0)

## 2018-07-06 NOTE — PROGRESS NOTES
[facility] HEART CARE   CARDIOLOGY CONSULT     Enzo Colin   10/10/1934  [id number]    Eunice Dias     Chief Complaint   Patient presents with     Consult     Newly diagnosed heart murmur, SOB abnormal echo.          HPI:   Mr. Colin is a 82-year-old gentleman who is being seen by cardiology for concerns of shortness of breath, heart murmur, follow-up on his echocardiogram and stress test.  He also has a history of tobacco abuse, hypertension, diastolic dysfunction type I, right bundle branch block, chronic kidney disease stage III and anxiety/panic attacks.    In talking to the patient, his main reason for the referral is a heart murmur.  He reports there was mention of having a heart murmur in the late 1980's.  He went on to have an echocardiogram on 6/15/18 that showed mild mitral insufficiency, mild tricuspid regurgitation, and mild pulmonary insufficiency.  We did discuss these findings.  He has no evidence for heart failure, lower extremity edema, PND, or orthopnea.  It was explained to him that these are common findings and often are not followed.  They typically do not cause problems or progress.    His echocardiogram 6/15/18 showed diastolic heart failure type I.  Clinically, he has no evidence for heart failure.  He has no lower extremity edema, shortness of breath, PND or orthopnea.  There has been concern for shortness of breath.  He describes this as only happening while panicking/having anxiety attacks.  At one point, he was taken to the hospital due to what sounds like a panic attack.  He describes being severely anxious, short of breath, with chest discomfort, and feeling like he was going to die.  He currently has Ativan which seems to relieve the symptoms.  He has close to 3 acres of land which he mows on a regular basis.  Part of this is with a riding lawnmower and the other portions are done via push mower.  He is not short of breath with his activities.  He is very active and is  "described by his daughter as \"overdoing it\".  He states with these activities, he is not particularly short of breath.  He does not have chest pain, chest tightness, or chest discomfort.  We discussed the etiology of this problem and the potential for lower extremity edema, PND, orthopnea, JVD, etc. He was encouraged to increase his activity, push for weight loss, and better control his blood pressure.    He had a stress test on 6/19/18.  This was negative for ischemia or fixed defect.  His ejection fraction was 71%.  His EKG portion was okay.  He was short of breath with the test.    He was significantly hypertensive today.  Initially, his blood pressure was 189/85 on recheck it was 166/88.  He does have a wrist blood pressure cuff monitor at home.  However, he has not been checking his blood pressures at home.  He was encouraged to check his blood pressures.  He describes his blood pressures being in the 108/60 range.  In looking back at some of his vitals, his blood pressure has been in the 140's.  Currently, he is on lisinopril 5 mg daily.  We suggested increasing this and he was agreeable.      IMAGING RESULTS:   PROCEDURE: NM MPI WITH LEXISCAN 6/19/2018 10:43 AM     HISTORY: ; Dyspnea on exertion     COMPARISONS: None.     TECHNIQUE: At rest 10 mCi of technetium 99m sestamibi was injected.  The patient was stressed and 30 mCi of technetium 99m sestamibi was  injected.     FINDINGS: On the rest and stress images there was a normal  distribution of tracer activity throughout the myocardium. There was  no evidence of microischemia. The rest gated images reveal the end  diastolic volume to be 80 miles. The end-systolic volume was 23 mL's.  Calculated ejection fraction was 71%.          IMPRESSION:   1. No evidence of myocardial ischemia.  2. Normal left ventricular function          Echo from 6/15/18.  PROCEDURE: NM MPI WITH LEXISCAN 6/19/2018 10:43 AM     HISTORY: ; Dyspnea on exertion     COMPARISONS: " None.     TECHNIQUE: At rest 10 mCi of technetium 99m sestamibi was injected.  The patient was stressed and 30 mCi of technetium 99m sestamibi was  injected.     FINDINGS: On the rest and stress images there was a normal  distribution of tracer activity throughout the myocardium. There was  no evidence of microischemia. The rest gated images reveal the end  diastolic volume to be 80 miles. The end-systolic volume was 23 mL's.  Calculated ejection fraction was 71%.          IMPRESSION:   1. No evidence of myocardial ischemia.  2. Normal left ventricular function           ALLERGIES:   Allergies   Allergen Reactions     Amoxicillin Palpitations     Clindamycin Rash     Sulfa Drugs Rash     Sulfa(Sulfonamide Antibiotics)          PAST MEDICAL HISTORY:   Past Medical History:   Diagnosis Date     Sprain of unspecified site of knee and leg 09/07/2000        PAST SURGICAL HISTORY:   Past Surgical History:   Procedure Laterality Date     discectomy  1986     open reduction internal fixation  2012    Rotator cuff tear (right)     surgical  1996    Bladder cancer        FAMILY HISTORY:   Family History   Problem Relation Age of Onset     Diabetes No family hx of      Hypertension No family hx of      Coronary Artery Disease No family hx of         SOCIAL HISTORY:   Social History     Social History     Marital status:      Spouse name: N/A     Number of children: N/A     Years of education: N/A     Social History Main Topics     Smoking status: Former Smoker     Types: Cigarettes     Smokeless tobacco: Former User      Comment: tried to quit yes, yr quit 1977     Alcohol use No     Drug use: No     Sexual activity: Not Asked     Other Topics Concern     Blood Transfusions Yes     Caffeine Concern Yes     coffee, 3 cups daily     Exercise Yes     walking     Social History Narrative         CURRENT MEDICATIONS:   Prior to Admission medications    Medication Sig Start Date End Date Taking? Authorizing Provider   aspirin  81 MG EC tablet Take 1 tablet (81 mg) by mouth daily 6/7/18  Yes Eunice Dias NP   Cyanocobalamin (VITAMIN B-12 SL) Vitamin B Complex - Vitamin B12 1,200mcg/mL Sublingual Drops, take one po daily   Yes Reported, Patient   doxycycline monohydrate 100 MG capsule Take 100 mg by mouth 9/18/17  Yes Reported, Patient   finasteride (PROSCAR) 5 MG tablet Take 5 mg by mouth 12/11/17  Yes Reported, Patient   lisinopril (PRINIVIL/ZESTRIL) 5 MG tablet Take 1 tablet (5 mg) by mouth daily 6/7/18  Yes Eunice Dias NP   LORazepam (ATIVAN) 0.5 MG tablet Take 1 tablet (0.5 mg) by mouth every 8 hours as needed 7/2/18  Yes Katlyn Courtney MD   metroNIDAZOLE (METROCREAM) 0.75 % cream Apply by topical route 2 time every day a thin layer to the affected areas in the morning and evening   Yes Reported, Patient   tamsulosin (FLOMAX) 0.4 MG capsule Take 0.4 mg by mouth 12/11/17  Yes Reported, Patient   tretinoin (RETIN-A) 0.01 % topical gel  3/30/17  Yes Reported, Patient          ROS:   CONSTITUTIONAL: No weight loss, fever, chills, weakness or fatigue.   HEENT: Eyes: No visual changes. Ears, Nose, Throat: No hearing loss, congestion or difficulty swallowing.   CARDIOVASCULAR: No chest pain, chest pressure or chest discomfort. No palpitations or lower extremity edema.   RESPIRATORY: Rare shortness of breath which sounds to be circumstantial with his anxiety but he denies, dyspnea upon exertion, cough or sputum production.   GASTROINTESTINAL: No abdominal pain. No anorexia, nausea, vomiting or diarrhea.   NEUROLOGICAL: No headache, lightheadedness, dizziness, syncope, ataxia or weakness.   HEMATOLOGIC: No anemia, bleeding or bruising.   PSYCHIATRIC: No history of depression or anxiety.   ENDOCRINOLOGIC: No reports of sweating, cold or heat intolerance. No polyuria or polydipsia.   SKIN: No abnormal rashes or itching.       PHYSICAL EXAM:   GENERAL: The patient is a well-developed, well-nourished, in no apparent  distress. Alert and oriented x3.   HEENT: Head is normocephalic and atraumatic. Eyes are symmetrical with normal visual tracking.  HEART: Regular rate and rhythm, S1S2 present (+) murmur, but no rub or gallop.   LUNGS: Respirations regular and unlabored. Clear to auscultation.   GI: Abdomen is soft and non distended.   EXTREMITIES: No peripheral edema present.   MUSCULOSKELETAL: No joint swelling.   NEUROLOGIC: Alert and oriented X3.    SKIN: No jaundice. No rashes or visible skin lesions present.       EKG:    EKG today, 7/6/18.  Sinus rhythm at 72 bpm.  Left axis deviation.  Right bundle branch block.  PACs.      LAB RESULTS:   Office Visit on 06/07/2018   Component Date Value Ref Range Status     WBC 06/07/2018 7.6  4.0 - 11.0 10e9/L Final     RBC Count 06/07/2018 4.68  4.4 - 5.9 10e12/L Final     Hemoglobin 06/07/2018 14.1  13.3 - 17.7 g/dL Final     Hematocrit 06/07/2018 43.2  40.0 - 53.0 % Final     MCV 06/07/2018 92  78 - 100 fl Final     MCH 06/07/2018 30.1  26.5 - 33.0 pg Final     MCHC 06/07/2018 32.6  31.5 - 36.5 g/dL Final     RDW 06/07/2018 14.5  10.0 - 15.0 % Final     Platelet Count 06/07/2018 180  150 - 450 10e9/L Final     Diff Method 06/07/2018 Automated Method   Final     % Neutrophils 06/07/2018 67.7  % Final     % Lymphocytes 06/07/2018 21.0  % Final     % Monocytes 06/07/2018 9.4  % Final     % Eosinophils 06/07/2018 1.8  % Final     % Basophils 06/07/2018 0.1  % Final     Absolute Neutrophil 06/07/2018 5.1  1.6 - 8.3 10e9/L Final     Absolute Lymphocytes 06/07/2018 1.6  0.8 - 5.3 10e9/L Final     Absolute Monocytes 06/07/2018 0.7  0.0 - 1.3 10e9/L Final     Absolute Eosinophils 06/07/2018 0.1  0.0 - 0.7 10e9/L Final     Absolute Basophils 06/07/2018 0.0  0.0 - 0.2 10e9/L Final     Sodium 06/07/2018 140  133 - 144 mmol/L Final     Potassium 06/07/2018 4.1  3.4 - 5.3 mmol/L Final     Chloride 06/07/2018 107  94 - 109 mmol/L Final     Carbon Dioxide 06/07/2018 27  20 - 32 mmol/L Final     Anion  Gap 06/07/2018 6  3 - 14 mmol/L Final     Glucose 06/07/2018 115* 70 - 99 mg/dL Final     Urea Nitrogen 06/07/2018 19  7 - 30 mg/dL Final     Creatinine 06/07/2018 1.26* 0.66 - 1.25 mg/dL Final     GFR Estimate 06/07/2018 55* >60 mL/min/1.7m2 Final     GFR Estimate If Black 06/07/2018 66  >60 mL/min/1.7m2 Final     Calcium 06/07/2018 8.8  8.5 - 10.1 mg/dL Final            ASSESSMENT:       ICD-10-CM    1. Shortness of breath R06.02 EKG 12-lead complete w/read - (Clinic Performed)   2. Heart murmur secondary to mitral, tricuspid, and pulmonary insufficiency. R01.1 EKG 12-lead complete w/read - (Clinic Performed)   3. History of tobacco abuse Z87.891    4. Essential hypertension, benign I10 lisinopril (PRINIVIL/ZESTRIL) 10 MG tablet   5. Chronic diastolic heart failure (H) I50.32    6. RBBB (right bundle branch block) I45.10    7. CKD (chronic kidney disease) stage 3, GFR 30-59 ml/min N18.3    8. RUMA (generalized anxiety disorder) F41.1    9. Panic attack F41.0          PLAN:   1.  We discussed his echocardiogram which shows some mild mitral insufficiency, tricuspid insufficiency, and pulmonic insufficiency and is likely the reason for his murmur.  Presently, these would not require treatment.  Generally, mild regurgitation does not necessarily need to be followed.  However, in the future if he were to have increasing shortness of breath, lower extremity edema, or concerns for heart failure/valvular changes, he may benefit from a repeat echocardiogram.  2.  He was identified to have diastolic dysfunction on his echocardiogram.  He does not have any clinical evidence for heart failure.  We did discuss the etiology and treatment of this problem.  He was cautioned with overindulgence of salt and fluid.  At some point, he may benefit from daily weights if he were to have fluid retention.  He has been encouraged to increase his activity and push for weight loss while controlling his blood pressure as these are the only  known treatment for this problem.  3.  We did discuss his stress test.  His stress test was negative for any concerns for ischemia.  His EF was normal at 71%.  4.  His blood pressure was significantly elevated today.  He feels this is partly due to anxiety.  We discussed increasing lisinopril from 5-10 mg daily which he was agreeable to doing.  5.  He is to maintain a log of his blood pressures and if consistently over 140 systolically this will need to be addressed.  6.  He will be seen in the future on a as needed basis.      Thank you for allowing me to participate in the care of your patient. Please do not hesitate to contact me if you have any questions.     Everardo Mojica, DO

## 2018-07-06 NOTE — MR AVS SNAPSHOT
After Visit Summary   7/6/2018    Enzo Colin    MRN: [id number]           Patient Information     Date Of Birth          10/10/1934        Visit Information        Provider Department      7/6/2018 8:30 AM Everardo Mojica, DO [facility]        Today's Diagnoses     Shortness of breath    -  1    Heart murmur        History of tobacco abuse        Essential hypertension, benign        Chronic diastolic heart failure (H)        RBBB (right bundle branch block)          Care Instructions    You were seen by Dr. Mojica, 7/6/2018.     1.  The results of your Echocardiogram showed that you have normal heart function.  You have a couple of valves that are mildly leaky, which is very common.      2.  The Echocardiogram also showed diastolic heart dysfunction, which means the heart tissue has stiffened.  It is rated as stage 1, which is mild.  It can lead to swelling in your legs and shortness of breath.  Some people experience symptoms and others do not.  There is no treatment to change the tissue stiffening, but exercise/activity is important, along with weight loss and controlling your blood pressure.  If you notice swelling and shortness of breath, please notify us.  There are medications (diuretics) that help rid the body of excess fluids.          3.  Check your blood pressure once daily at home and keep a record of the readings.  The goal is to keep your blood pressure less than 140/80.        4. Continue staying active as you are now.  Be sure to take breaks so you aren't exhausting yourself.        5. Increase Lisinopril to 10 mg instead of 5 mg once daily.  We will call in a new prescription, but take 2 tablets until you run out of the tablets you currently have.  This change is being made to help control your blood pressure.      6. The results of your stress test are normal.      7.  The heart murmur you have is likely due to the leaky valves you have that were a finding on your  "Echocardiogram.  There is nothing that needs to be done about this unless you start having symptoms such as swelling or shortness of breath.         You will follow up with Dr. Mojica as needed for any issues or concerns.       Please call the cardiology office with problems, questions, or concerns at [phone].    If you experience chest pain, chest pressure, chest tightness, shortness of breath, fainting, lightheadedness, nausea, vomiting, or other concerning symptoms, please report to the Emergency Department or call 911. These symptoms may be emergent, and best treated in the Emergency Department.     Radha Parsons RN  Cardiology   [facility]  [phone]              Follow-ups after your visit        Who to contact     If you have questions or need follow up information about today's clinic visit or your schedule please contact [facility] directly at [phone].  Normal or non-critical lab and imaging results will be communicated to you by MyChart, letter or phone within 4 business days after the clinic has received the results. If you do not hear from us within 7 days, please contact the clinic through MyChart or phone. If you have a critical or abnormal lab result, we will notify you by phone as soon as possible.  Submit refill requests through [employer] or call your pharmacy and they will forward the refill request to us. Please allow 3 business days for your refill to be completed.          Additional Information About Your Visit        Care EveryWhere ID     This is your Care EveryWhere ID. This could be used by other organizations to access your [facility] medical records  [id number]        Your Vitals Were     Pulse Temperature Respirations Height Pulse Oximetry BMI (Body Mass Index)    80 97.6  F (36.4  C) (Tympanic) 18 5' 9\" (1.753 m) 99% 26.88 kg/m2       Blood Pressure from Last 3 Encounters:   07/06/18 166/88   06/18/18 108/62   06/07/18 142/80    Weight from Last 3 " Encounters:   07/06/18 182 lb (82.6 kg)   06/07/18 190 lb (86.2 kg)   04/11/18 192 lb (87.1 kg)              We Performed the Following     EKG 12-lead complete w/read - (Clinic Performed)        Primary Care Provider Office Phone # Fax #    Eunice Dias KHADRA [phone] [facility] [facility] [address]        Equal Access to Services     ELIZABETH MENDIOLA : Hadii aad ku hadasho Soomaali, waaxda luqadaha, qaybta kaalmada adeegyada, waxay idiin hayaan adeeg kharatatyana laestela de la cruz. So [facility] [phone].    ATENCIÓN: Si habla español, tiene a rajan disposición servicios gratuitos de asistencia lingüística. [facility] [phone].    We comply with applicable federal civil rights laws and [place] laws. We do not discriminate on the basis of race, color, national origin, age, disability, sex, sexual orientation, or gender identity.            Thank you!     Thank you for [facility]  [facility]. Our goal is always to provide you with excellent care. Hearing back from our patients is one way we can continue to improve our services. Please take a few minutes to complete the written survey that you may receive in the mail after your visit with us. Thank you!             Your Updated Medication List - Protect others around you: Learn how to safely use, store and throw away your medicines at www.disposemymeds.org.          This list is accurate as of 7/6/18  9:28 AM.  Always use your most recent med list.                   Brand Name Dispense Instructions for use Diagnosis    aspirin 81 MG EC tablet     90 tablet    Take 1 tablet (81 mg) by mouth daily    Benign essential hypertension       doxycycline monohydrate 100 MG capsule      Take 100 mg by mouth        finasteride 5 MG tablet    PROSCAR     Take 5 mg by mouth        lisinopril 5 MG tablet    PRINIVIL/ZESTRIL    90 tablet    Take 1 tablet (5 mg) by mouth daily    Benign essential hypertension        LORazepam 0.5 MG tablet    ATIVAN    20 tablet    Take 1 tablet (0.5 mg) by mouth every 8 hours as needed    Anxiety       METROCREAM 0.75 % cream   Generic drug:  metroNIDAZOLE      Apply by topical route 2 time every day a thin layer to the affected areas in the morning and evening        tamsulosin 0.4 MG capsule    FLOMAX     Take 0.4 mg by mouth        tretinoin 0.01 % topical gel    RETIN-A          VITAMIN B-12 SL      Vitamin B Complex - Vitamin B12 1,200mcg/mL Sublingual Drops, take one po daily

## 2018-07-06 NOTE — PATIENT INSTRUCTIONS
You were seen by Dr. Mojica, 7/6/2018.     1.  The results of your Echocardiogram showed that you have normal heart function.  You have a couple of valves that are mildly leaky, which is very common.      2.  The Echocardiogram also showed diastolic heart dysfunction, which means the heart tissue has stiffened.  It is rated as stage 1, which is mild.  It can lead to swelling in your legs and shortness of breath.  Some people experience symptoms and others do not.  There is no treatment to change the tissue stiffening, but exercise/activity is important, along with weight loss and controlling your blood pressure.  If you notice swelling and shortness of breath, please notify us.  There are medications (diuretics) that help rid the body of excess fluids.          3.  Check your blood pressure once daily at home and keep a record of the readings.  The goal is to keep your blood pressure less than 140/80.        4. Continue staying active as you are now.  Be sure to take breaks so you aren't exhausting yourself.        5. Increase Lisinopril to 10 mg instead of 5 mg once daily.  We will call in a new prescription, but take 2 tablets until you run out of the tablets you currently have.  This change is being made to help control your blood pressure.      6. The results of your stress test are normal.      7.  The heart murmur you have is likely due to the leaky valves you have that were a finding on your Echocardiogram.  There is nothing that needs to be done about this unless you start having symptoms such as swelling or shortness of breath.         You will follow up with Dr. Mojica as needed for any issues or concerns.       Please call the cardiology office with problems, questions, or concerns at [phone].    If you experience chest pain, chest pressure, chest tightness, shortness of breath, fainting, lightheadedness, nausea, vomiting, or other concerning symptoms, please report to the Emergency Department or call  [phone]. These symptoms may be emergent, and best treated in the Emergency Department.     Radha Parsons RN  Cardiology   [facility]  [phone]

## 2018-07-11 ENCOUNTER — TELEPHONE (OUTPATIENT)
Dept: FAMILY MEDICINE | Facility: OTHER | Age: 83
End: 2018-07-11

## 2018-07-11 NOTE — TELEPHONE ENCOUNTER
Patient would like to speak with nurse regarding blood pressure and how often he was supposed to come in. Patient also has some questions for the nurse.

## 2018-07-11 NOTE — TELEPHONE ENCOUNTER
Called patient back.See below.He has had Lisinopril increased to 10mg. This AM it was 137/80 and later today it was 150's/80s.He is questioning the accuracy of his home wrist BP cuff.Encouraged to come to clinic for nurse only BP check tomorrow.No complaints,per patient feels good. Please notes.Thank you.

## 2018-07-12 ENCOUNTER — ALLIED HEALTH/NURSE VISIT (OUTPATIENT)
Dept: FAMILY MEDICINE | Facility: OTHER | Age: 83
End: 2018-07-12
Attending: NURSE PRACTITIONER
Payer: MEDICARE

## 2018-07-12 VITALS — HEART RATE: 79 BPM | SYSTOLIC BLOOD PRESSURE: 162 MMHG | DIASTOLIC BLOOD PRESSURE: 78 MMHG

## 2018-07-12 DIAGNOSIS — I10 ESSENTIAL HYPERTENSION, BENIGN: ICD-10-CM

## 2018-07-12 PROCEDURE — 99207 ZZC NO CHARGE NURSE ONLY: CPT

## 2018-07-12 RX ORDER — LISINOPRIL 20 MG/1
20 TABLET ORAL DAILY
Qty: 30 TABLET | Refills: 5 | Status: SHIPPED | OUTPATIENT
Start: 2018-07-12 | End: 2018-07-19

## 2018-07-12 NOTE — MR AVS SNAPSHOT
After Visit Summary   7/12/2018    Enzo Colin    MRN: [id number]           Patient Information     Date Of Birth          10/10/1934        Visit Information        Provider Department      7/12/2018 8:30 AM [facility] NURSE [facility]        Today's Diagnoses     Essential hypertension, benign           Follow-ups after your visit        Who to contact     If you have questions or need follow up information about today's clinic visit or your schedule please contact [facility] directly at [phone].  Normal or non-critical lab and imaging results will be communicated to you by MyChart, letter or phone within 4 business days after the clinic has received the results. If you do not hear from us within 7 days, please contact the clinic through MyChart or phone. If you have a critical or abnormal lab result, we will notify you by phone as soon as possible.  Submit refill requests through [employer] or call your pharmacy and they will forward the refill request to us. Please allow 3 business days for your refill to be completed.          Additional Information About Your Visit        Care EveryWhere ID     This is your Care EveryWhere ID. This could be used by other organizations to access your [place] medical records  [id number]        Your Vitals Were     Pulse                   79            Blood Pressure from Last 3 Encounters:   07/12/18 162/78   07/06/18 166/88   06/18/18 108/62    Weight from Last 3 Encounters:   07/06/18 182 lb (82.6 kg)   06/07/18 190 lb (86.2 kg)   04/11/18 192 lb (87.1 kg)              Today, you had the following     No orders found for display         Today's Medication Changes          These changes are accurate as of 7/12/18  9:08 AM.  If you have any questions, ask your nurse or doctor.               These medicines have changed or have updated prescriptions.        Dose/Directions    lisinopril 20 MG tablet   Commonly known as:  PRINIVIL/ZESTRIL    This may have changed:    - medication strength  - how much to take   Used for:  Essential hypertension, benign        Dose:  20 mg   Take 1 tablet (20 mg) by mouth daily   Quantity:  30 tablet   Refills:  5            Where to get your medicines      These medications were sent to [facility] [place] - [address]  [address] [place] [address]     Phone:  [phone]     lisinopril 20 MG tablet                Primary Care Provider Office Phone # Fax #    Eunice DYKESScott Dias -[phone] [address] [facility] [address]        Equal Access to Services     [facility]: Hadii fabian ernandez hadasho Soomaali, waaxda luqadaha, qaybta kaalmada adeegyada, lesly worrell . So [facility] [phone].    ATENCIÓN: Si habla español, tiene a rajan disposición servicios gratuitos de asistencia lingüística. Llame al [phone].    We comply with applicable federal civil rights laws and [place] laws. We do not discriminate on the basis of race, color, national origin, age, disability, sex, sexual orientation, or gender identity.            Thank you!     Thank you for choosing [facility]  for your care. Our goal is always to provide you with excellent care. Hearing back from our patients is one way we can continue to improve our services. Please take a few minutes to complete the written survey that you may receive in the mail after your visit with us. Thank you!             Your Updated Medication List - Protect others around you: Learn how to safely use, store and throw away your medicines at www.disposemymeds.org.          This list is accurate as of 7/12/18  9:08 AM.  Always use your most recent med list.                   Brand Name Dispense Instructions for use Diagnosis    aspirin 81 MG EC tablet     90 tablet    Take 1 tablet (81 mg) by mouth daily    Benign essential hypertension       doxycycline monohydrate 100 MG capsule       Take 100 mg by mouth        lisinopril 20 MG tablet    PRINIVIL/ZESTRIL    30 tablet    Take 1 tablet (20 mg) by mouth daily    Essential hypertension, benign       LORazepam 0.5 MG tablet    ATIVAN    20 tablet    Take 1 tablet (0.5 mg) by mouth every 8 hours as needed    Anxiety       METROCREAM 0.75 % cream   Generic drug:  metroNIDAZOLE      Apply by topical route 2 time every day a thin layer to the affected areas in the morning and evening        PROSCAR PO      Take 5 mg by mouth daily        tamsulosin 0.4 MG capsule    FLOMAX     Take 0.4 mg by mouth        tretinoin 0.01 % topical gel    RETIN-A          VITAMIN B-12 SL      Vitamin B Complex - Vitamin B12 1,200mcg/mL Sublingual Drops, take one po daily

## 2018-07-17 ENCOUNTER — TELEPHONE (OUTPATIENT)
Dept: FAMILY MEDICINE | Facility: OTHER | Age: 83
End: 2018-07-17

## 2018-07-17 NOTE — TELEPHONE ENCOUNTER
3:34 PM    Reason for Call: Phone Call    Description: Pt called and stated that he needed to give a nurse his bp information. Please call. Thank you    Was an appointment offered for this call? No  If yes : Appointment type              Date    Preferred method for responding to this message: Telephone Call  What is your phone number ?[phone]    If we cannot reach you directly, may we leave a detailed response at the number you provided? Yes    Can this message wait until your PCP/provider returns, if available today? Not applicable,     Cathie Whaley

## 2018-07-17 NOTE — TELEPHONE ENCOUNTER
Patient called back and had dose increase on 7.12.18. BP's better per patient 130/70-78. Does not know HR.He will come in for a BP if you would like him to.Any new orders?Thank you.

## 2018-07-19 ENCOUNTER — ALLIED HEALTH/NURSE VISIT (OUTPATIENT)
Dept: FAMILY MEDICINE | Facility: OTHER | Age: 83
End: 2018-07-19
Attending: NURSE PRACTITIONER
Payer: MEDICARE

## 2018-07-19 VITALS — DIASTOLIC BLOOD PRESSURE: 66 MMHG | HEART RATE: 76 BPM | SYSTOLIC BLOOD PRESSURE: 136 MMHG

## 2018-07-19 DIAGNOSIS — I10 ESSENTIAL HYPERTENSION, BENIGN: ICD-10-CM

## 2018-07-19 PROCEDURE — 99207 ZZC NO CHARGE NURSE ONLY: CPT

## 2018-07-19 RX ORDER — LISINOPRIL 20 MG/1
20 TABLET ORAL DAILY
Qty: 90 TABLET | Refills: 3 | Status: SHIPPED | OUTPATIENT
Start: 2018-07-19 | End: 2018-07-26

## 2018-07-19 NOTE — MR AVS SNAPSHOT
After Visit Summary   7/19/2018    Enzo Colin    MRN: [id number]           Patient Information     Date Of Birth          10/10/1934        Visit Information        Provider Department      7/19/2018 3:00 PM [facility] NURSE [facility]        Today's Diagnoses     Essential hypertension, benign           Follow-ups after your visit        Who to contact     If you have questions or need follow up information about today's clinic visit or your schedule please contact [facility] directly at [phone].  Normal or non-critical lab and imaging results will be communicated to you by MyChart, letter or phone within 4 business days after the clinic has received the results. If you do not hear from us within 7 days, please contact the clinic through MyChart or phone. If you have a critical or abnormal lab result, we will notify you by phone as soon as possible.  Submit refill requests through [employer] or call your pharmacy and they will forward the refill request to us. Please allow 3 business days for your refill to be completed.          Additional Information About Your Visit        Care EveryWhere ID     This is your Care EveryWhere ID. This could be used by other organizations to access your [employer] medical records  [id number]        Your Vitals Were     Pulse                   76            Blood Pressure from Last 3 Encounters:   07/19/18 136/66   07/12/18 162/78   07/06/18 166/88    Weight from Last 3 Encounters:   07/06/18 182 lb (82.6 kg)   06/07/18 190 lb (86.2 kg)   04/11/18 192 lb (87.1 kg)              Today, you had the following     No orders found for display         Where to get your medicines      These medications were sent to [facility] - [place], [place] - [address]  [address], [facility] [address]     Phone:  [phone]     lisinopril 20 MG tablet          Primary Care Provider Office Phone # Fax #    Eunice GASCA  KHADRA Dias [phone]       [address] [facility] [address]        Equal Access to Services     [facility]IZAB[facility] MENDIOLA : Hadii aad ku hadhometevin Mcfarlane, waaubreyda simonateresa, qamatthewta kaalmada cholo, lesly dionein hayaakeven britoannita miguel gm de la cruz. So [facility] [phone].    ATENCIÓN: Si habla español, tiene a rajan disposición servicios gratuitos de asistencia lingüística. Llame al [phone].    We comply with applicable federal civil rights laws and [place] laws. We do not discriminate on the basis of race, color, national origin, age, disability, sex, sexual orientation, or gender identity.            Thank you!     Thank you for choosing [facility]  for your care. Our goal is always to provide you with excellent care. Hearing back from our patients is one way we can continue to improve our services. Please take a few minutes to complete the written survey that you may receive in the mail after your visit with us. Thank you!             Your Updated Medication List - Protect others around you: Learn how to safely use, store and throw away your medicines at www.disposemymeds.org.          This list is accurate as of 7/19/18  4:55 PM.  Always use your most recent med list.                   Brand Name Dispense Instructions for use Diagnosis    aspirin 81 MG EC tablet     90 tablet    Take 1 tablet (81 mg) by mouth daily    Benign essential hypertension       doxycycline monohydrate 100 MG capsule      Take 100 mg by mouth        lisinopril 20 MG tablet    PRINIVIL/ZESTRIL    90 tablet    Take 1 tablet (20 mg) by mouth daily    Essential hypertension, benign       LORazepam 0.5 MG tablet    ATIVAN    20 tablet    Take 1 tablet (0.5 mg) by mouth every 8 hours as needed    Anxiety       METROCREAM 0.75 % cream   Generic drug:  metroNIDAZOLE      Apply by topical route 2 time every day a thin layer to the affected areas in the morning and evening        PROSCAR PO      Take 5 mg by  mouth daily        tamsulosin 0.4 MG capsule    FLOMAX     Take 0.4 mg by mouth        tretinoin 0.01 % topical gel    RETIN-A          VITAMIN B-12 SL      Vitamin B Complex - Vitamin B12 1,200mcg/mL Sublingual Drops, take one po daily

## 2018-07-26 ENCOUNTER — TELEPHONE (OUTPATIENT)
Dept: FAMILY MEDICINE | Facility: OTHER | Age: 83
End: 2018-07-26

## 2018-07-26 DIAGNOSIS — I10 ESSENTIAL HYPERTENSION, BENIGN: ICD-10-CM

## 2018-07-26 RX ORDER — LISINOPRIL 20 MG/1
20 TABLET ORAL DAILY
Qty: 90 TABLET | Refills: 3 | Status: SHIPPED | OUTPATIENT
Start: 2018-07-26 | End: 2019-06-08

## 2018-07-26 NOTE — TELEPHONE ENCOUNTER
10:38 AM    Reason for Call: Phone Call    Description: Pt called and claimed that his medication, lisinopril (PRINIVIL/ZESTRIL) 20 MG tablet, was not received by his pharmacy, [facility]. Please call.     Was an appointment offered for this call? No  If yes : Appointment type              Date    Preferred method for responding to this message: Telephone Call  What is your phone number ?[phone]    If we cannot reach you directly, may we leave a detailed response at the number you provided? Yes    Can this message wait until your PCP/provider returns, if available today? Not applicable, provider is in    Cathie Whaley

## 2018-11-02 ENCOUNTER — ALLIED HEALTH/NURSE VISIT (OUTPATIENT)
Dept: FAMILY MEDICINE | Facility: OTHER | Age: 83
End: 2018-11-02
Attending: NURSE PRACTITIONER
Payer: MEDICARE

## 2018-11-02 DIAGNOSIS — Z23 NEED FOR PROPHYLACTIC VACCINATION AND INOCULATION AGAINST INFLUENZA: Primary | ICD-10-CM

## 2018-11-02 PROCEDURE — 90662 IIV NO PRSV INCREASED AG IM: CPT

## 2018-11-02 PROCEDURE — G0008 ADMIN INFLUENZA VIRUS VAC: HCPCS

## 2018-11-02 NOTE — PROGRESS NOTES
Injectable Influenza Immunization Documentation    1.  Is the person to be vaccinated sick today?   No    2. Does the person to be vaccinated have an allergy to a component   of the vaccine?   No  Egg Allergy Algorithm Link    3. Has the person to be vaccinated ever had a serious reaction   to influenza vaccine in the past?   No    4. Has the person to be vaccinated ever had Guillain-Barré syndrome?   No    Form completed by Ember Timmons LPN on 11/2/2018 at 3:48 PM      Prior to injection verified patient identity using patient's name and date of birth.

## 2018-11-02 NOTE — MR AVS SNAPSHOT
"              After Visit Summary   2018    Enzo Colin    MRN: [id number]           Patient Information     Date Of Birth          10/10/1934        Visit Information        Provider Department      2018 3:30 PM [facility] NURSE [facility]        Today's Diagnoses     Need for prophylactic vaccination and inoculation against influenza    -  1       Follow-ups after your visit        Who to contact     If you have questions or need follow up information about today's clinic visit or your schedule please contact [facility] directly at [phone].  Normal or non-critical lab and imaging results will be communicated to you by Qubithart, letter or phone within 4 business days after the clinic has received the results. If you do not hear from us within 7 days, please contact the clinic through Quirkyt or phone. If you have a critical or abnormal lab result, we will notify you by phone as soon as possible.  Submit refill requests through [employer] or call your pharmacy and they will forward the refill request to us. Please allow 3 business days for your refill to be completed.          Additional Information About Your Visit        MyChart Information     [employer] lets you send messages to your doctor, view your test results, renew your prescriptions, schedule appointments and more. To sign up, go to [email or web address] . Click on \"Log in\" on the left side of the screen, which will take you to the Welcome page. Then click on \"Sign up Now\" on the right side of the page.     You will be asked to enter the access code listed below, as well as some personal information. Please follow the directions to create your username and password.     Your access code is: [email or web address]  Expires: 2019  3:50 PM     Your access code will  in 90 days. If you need help or a new code, please call your [facility] or [phone].        Care EveryWhere ID     This is your Care " EveryWhere ID. This could be used by other organizations to access your [place] medical records  [id number]         Blood Pressure from Last 3 Encounters:   07/19/18 136/66   07/12/18 162/78   07/06/18 166/88    Weight from Last 3 Encounters:   07/06/18 182 lb (82.6 kg)   06/07/18 190 lb (86.2 kg)   04/11/18 192 lb (87.1 kg)              We Performed the Following     ADMIN INFLUENZA (For MEDICARE Patients ONLY) []     HC FLU VACCINE, INCREASED ANTIGEN, PRESV FREE        Primary Care Provider Office Phone # Fax #    Eunice GallagherGinnyCaden, KHADRA [phone] [address] [facility] [address]        Equal Access to Services     ELIZABETH MENDIOLA : Hadii aad ku hadasho Soomaali, waaxda luqadaha, qaybta kaalmada adeegyada, lesly worrell . So [facility] [phone].    ATENCIÓN: Si habla español, tiene a rajan disposición servicios gratuitos de asistencia lingüística. Llame al [phone].    We comply with applicable federal civil rights laws and [place] laws. We do not discriminate on the basis of race, color, national origin, age, disability, sex, sexual orientation, or gender identity.            Thank you!     Thank you for choosing [facility]  for your care. Our goal is always to provide you with excellent care. Hearing back from our patients is one way we can continue to improve our services. Please take a few minutes to complete the written survey that you may receive in the mail after your visit with us. Thank you!             Your Updated Medication List - Protect others around you: Learn how to safely use, store and throw away your medicines at www.disposemymeds.org.          This list is accurate as of 11/2/18  3:50 PM.  Always use your most recent med list.                   Brand Name Dispense Instructions for use Diagnosis    aspirin 81 MG EC tablet     90 tablet    Take 1 tablet (81 mg) by mouth daily    Benign essential  hypertension       doxycycline monohydrate 100 MG capsule      Take 100 mg by mouth        lisinopril 20 MG tablet    PRINIVIL/ZESTRIL    90 tablet    Take 1 tablet (20 mg) by mouth daily    Essential hypertension, benign       LORazepam 0.5 MG tablet    ATIVAN    20 tablet    Take 1 tablet (0.5 mg) by mouth every 8 hours as needed    Anxiety       METROCREAM 0.75 % cream   Generic drug:  metroNIDAZOLE      Apply by topical route 2 time every day a thin layer to the affected areas in the morning and evening        PROSCAR PO      Take 5 mg by mouth daily        tamsulosin 0.4 MG capsule    FLOMAX     Take 0.4 mg by mouth        tretinoin 0.01 % topical gel    RETIN-A          VITAMIN B-12 SL      Vitamin B Complex - Vitamin B12 1,200mcg/mL Sublingual Drops, take one po daily

## 2019-04-19 NOTE — PROGRESS NOTES
SUBJECTIVE:   Enzo Colin is a 84 year old male who presents to clinic today for the following   health issues:        Acute Illness   Acute illness concerns: sinus issues   Onset: 3 weeks  - now improved.      Fever: no     Chills/Sweats: no     Headache (location?): YES    Sinus Pressure: yes    Conjunctivitis:  no    Ear Pain: no    Rhinorrhea: no     Congestion: YES    Sore Throat: no      Cough: YES-productive of clear sputum    Wheeze: no     Decreased Appetite: no     Nausea: no     Vomiting: no     Diarrhea:  no     Dysuria/Freq.: no     Fatigue/Achiness: no     Sick/Strep Exposure: no      Therapies Tried and outcome: cough medication vicks around neck feels lot better.       Hypertension Follow-up      Outpatient blood pressures are not being checked.    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     History of tobacco abuse     Essential hypertension, benign     Chronic diastolic heart failure (H)     RBBB (right bundle branch block)     CKD (chronic kidney disease) stage 3, GFR 30-59 ml/min (H)     RUMA (generalized anxiety disorder)     Panic attack     Macular degeneration (senile) of retina     Past Surgical History:   Procedure Laterality Date     discectomy  1986     open reduction internal fixation  2012    Rotator cuff tear (right)     surgical  1996    Bladder cancer       Social History     Tobacco Use     Smoking status: Former Smoker     Types: Cigarettes     Smokeless tobacco: Former User     Tobacco comment: tried to quit yes, yr quit 1977   Substance  "Use Topics     Alcohol use: No     Family History   Problem Relation Age of Onset     Diabetes No family hx of      Hypertension No family hx of      Coronary Artery Disease No family hx of          Current Outpatient Medications   Medication Sig Dispense Refill     aspirin 81 MG EC tablet Take 1 tablet (81 mg) by mouth daily 90 tablet 3     Cyanocobalamin (VITAMIN B-12 SL) Vitamin B Complex - Vitamin B12 1,200mcg/mL Sublingual Drops, take one po daily       doxycycline monohydrate 100 MG capsule Take 100 mg by mouth       Finasteride (PROSCAR PO) Take 5 mg by mouth daily       lisinopril (PRINIVIL/ZESTRIL) 20 MG tablet Take 1 tablet (20 mg) by mouth daily 90 tablet 3     LORazepam (ATIVAN) 0.5 MG tablet Take 1 tablet (0.5 mg) by mouth every 8 hours as needed 20 tablet 0     metroNIDAZOLE (METROCREAM) 0.75 % cream Apply by topical route 2 time every day a thin layer to the affected areas in the morning and evening       tamsulosin (FLOMAX) 0.4 MG capsule Take 0.4 mg by mouth       tretinoin (RETIN-A) 0.01 % topical gel        Allergies   Allergen Reactions     Amoxicillin Palpitations     Clindamycin Rash     Sulfa Drugs Rash     Sulfa(Sulfonamide Antibiotics)       Recent Labs   Lab Test 06/07/18  1625   CR 1.26*   GFRESTIMATED 55*   GFRESTBLACK 66   POTASSIUM 4.1      BP Readings from Last 3 Encounters:   04/22/19 138/76   07/19/18 136/66   07/12/18 162/78    Wt Readings from Last 3 Encounters:   04/22/19 87 kg (191 lb 14.4 oz)   07/06/18 82.6 kg (182 lb)   06/07/18 86.2 kg (190 lb)                    ROS:  Constitutional, HEENT, cardiovascular, pulmonary, gi and gu systems are negative, except as otherwise noted.    OBJECTIVE:     /76 (BP Location: Right arm, Patient Position: Chair, Cuff Size: Adult Large)   Pulse 72   Temp 97.9  F (36.6  C) (Tympanic)   Resp 18   Ht 1.753 m (5' 9\")   Wt 87 kg (191 lb 14.4 oz)   SpO2 98%   BMI 28.34 kg/m    Body mass index is 28.34 kg/m .  GENERAL: healthy, alert " and no distress  NECK: no adenopathy, no asymmetry, masses, or scars, thyroid normal to palpation and no carotid bruits  RESP: lungs clear to auscultation - no rales, rhonchi or wheezes  CV: regular rate and rhythm, normal S1 S2, no S3 or S4, no murmur, click or rub, no peripheral edema and peripheral pulses strong  MS: no gross musculoskeletal defects noted, no edema  PSYCH: mentation appears normal, affect normal/bright        ASSESSMENT/PLAN:       1. Essential hypertension, benign  Continue current plan   - Lipid Profile  - TSH with free T4 reflex  - Basic metabolic panel    2. Benign prostatic hyperplasia with lower urinary tract symptoms, symptom details unspecified  Continue continue current plan  Follow-up with urology as scheduled    3. CKD (chronic kidney disease) stage 3, GFR 30-59 ml/min (H)  Do not use NSAIDS    Sinus issues have resolved.     FUTURE APPOINTMENTS:       - Follow-up visit in 6 months or as needed for acute concerns     Eunice Gallagher-KHARDA Negrete  [facility]

## 2019-04-22 ENCOUNTER — OFFICE VISIT (OUTPATIENT)
Dept: FAMILY MEDICINE | Facility: OTHER | Age: 84
End: 2019-04-22
Attending: NURSE PRACTITIONER
Payer: MEDICARE

## 2019-04-22 VITALS
SYSTOLIC BLOOD PRESSURE: 138 MMHG | OXYGEN SATURATION: 98 % | TEMPERATURE: 97.9 F | DIASTOLIC BLOOD PRESSURE: 76 MMHG | HEART RATE: 72 BPM | HEIGHT: 69 IN | BODY MASS INDEX: 28.42 KG/M2 | WEIGHT: 191.9 LBS | RESPIRATION RATE: 18 BRPM

## 2019-04-22 DIAGNOSIS — I10 ESSENTIAL HYPERTENSION, BENIGN: Primary | ICD-10-CM

## 2019-04-22 DIAGNOSIS — N40.1 BENIGN PROSTATIC HYPERPLASIA WITH LOWER URINARY TRACT SYMPTOMS, SYMPTOM DETAILS UNSPECIFIED: ICD-10-CM

## 2019-04-22 DIAGNOSIS — N18.30 CKD (CHRONIC KIDNEY DISEASE) STAGE 3, GFR 30-59 ML/MIN (H): ICD-10-CM

## 2019-04-22 PROCEDURE — 80048 BASIC METABOLIC PNL TOTAL CA: CPT | Mod: ZL | Performed by: NURSE PRACTITIONER

## 2019-04-22 PROCEDURE — 99213 OFFICE O/P EST LOW 20 MIN: CPT | Performed by: NURSE PRACTITIONER

## 2019-04-22 PROCEDURE — 84443 ASSAY THYROID STIM HORMONE: CPT | Mod: ZL | Performed by: NURSE PRACTITIONER

## 2019-04-22 PROCEDURE — 80061 LIPID PANEL: CPT | Mod: ZL | Performed by: NURSE PRACTITIONER

## 2019-04-22 PROCEDURE — G0463 HOSPITAL OUTPT CLINIC VISIT: HCPCS

## 2019-04-22 PROCEDURE — 36415 COLL VENOUS BLD VENIPUNCTURE: CPT | Mod: ZL | Performed by: NURSE PRACTITIONER

## 2019-04-22 ASSESSMENT — MIFFLIN-ST. JEOR: SCORE: 1550.83

## 2019-04-22 ASSESSMENT — PAIN SCALES - GENERAL: PAINLEVEL: NO PAIN (0)

## 2019-04-22 NOTE — NURSING NOTE
"Chief Complaint   Patient presents with     Sinus Problem       Initial /76 (BP Location: Right arm, Patient Position: Chair, Cuff Size: Adult Large)   Pulse 72   Temp 97.9  F (36.6  C) (Tympanic)   Resp 18   Ht 1.753 m (5' 9\")   Wt 87 kg (191 lb 14.4 oz)   SpO2 98%   BMI 28.34 kg/m   Estimated body mass index is 28.34 kg/m  as calculated from the following:    Height as of this encounter: 1.753 m (5' 9\").    Weight as of this encounter: 87 kg (191 lb 14.4 oz).  Medication Reconciliation: complete    Pamela M. Lechevalier, LPN    "

## 2019-04-22 NOTE — PATIENT INSTRUCTIONS
ASSESSMENT/PLAN:       1. Essential hypertension, benign  Continue current plan   - Lipid Profile  - TSH with free T4 reflex  - Basic metabolic panel    2. Benign prostatic hyperplasia with lower urinary tract symptoms, symptom details unspecified  Continue continue current plan  Follow-up with urology as scheduled    3. CKD (chronic kidney disease) stage 3, GFR 30-59 ml/min (H)  Do not use NSAIDS      FUTURE APPOINTMENTS:       - Follow-up visit in 6 months or as needed for acute concerns     Eunice Dias, NP  [facility]

## 2019-04-23 LAB
ANION GAP SERPL CALCULATED.3IONS-SCNC: 5 MMOL/L (ref 3–14)
BUN SERPL-MCNC: 26 MG/DL (ref 7–30)
CALCIUM SERPL-MCNC: 8.1 MG/DL (ref 8.5–10.1)
CHLORIDE SERPL-SCNC: 108 MMOL/L (ref 94–109)
CHOLEST SERPL-MCNC: 158 MG/DL
CO2 SERPL-SCNC: 28 MMOL/L (ref 20–32)
CREAT SERPL-MCNC: 1.31 MG/DL (ref 0.66–1.25)
GFR SERPL CREATININE-BSD FRML MDRD: 49 ML/MIN/{1.73_M2}
GLUCOSE SERPL-MCNC: 93 MG/DL (ref 70–99)
HDLC SERPL-MCNC: 37 MG/DL
LDLC SERPL CALC-MCNC: 92 MG/DL
NONHDLC SERPL-MCNC: 121 MG/DL
POTASSIUM SERPL-SCNC: 4.5 MMOL/L (ref 3.4–5.3)
SODIUM SERPL-SCNC: 141 MMOL/L (ref 133–144)
TRIGL SERPL-MCNC: 144 MG/DL
TSH SERPL DL<=0.005 MIU/L-ACNC: 3.53 MU/L (ref 0.4–4)

## 2019-06-08 DIAGNOSIS — I10 ESSENTIAL HYPERTENSION, BENIGN: ICD-10-CM

## 2019-06-10 RX ORDER — LISINOPRIL 20 MG/1
TABLET ORAL
Qty: 90 TABLET | Refills: 1 | Status: SHIPPED | OUTPATIENT
Start: 2019-06-10 | End: 2019-08-15

## 2019-08-09 ENCOUNTER — TELEPHONE (OUTPATIENT)
Dept: FAMILY MEDICINE | Facility: OTHER | Age: 84
End: 2019-08-09

## 2019-08-15 ENCOUNTER — TELEPHONE (OUTPATIENT)
Dept: FAMILY MEDICINE | Facility: OTHER | Age: 84
End: 2019-08-15

## 2019-08-15 ENCOUNTER — OFFICE VISIT (OUTPATIENT)
Dept: FAMILY MEDICINE | Facility: OTHER | Age: 84
End: 2019-08-15
Attending: NURSE PRACTITIONER
Payer: MEDICARE

## 2019-08-15 VITALS
BODY MASS INDEX: 28.14 KG/M2 | RESPIRATION RATE: 14 BRPM | SYSTOLIC BLOOD PRESSURE: 154 MMHG | OXYGEN SATURATION: 99 % | HEART RATE: 68 BPM | DIASTOLIC BLOOD PRESSURE: 74 MMHG | WEIGHT: 190 LBS | HEIGHT: 69 IN | TEMPERATURE: 97.1 F

## 2019-08-15 DIAGNOSIS — J01.90 ACUTE SINUSITIS WITH SYMPTOMS GREATER THAN 10 DAYS: Primary | ICD-10-CM

## 2019-08-15 DIAGNOSIS — I10 ESSENTIAL HYPERTENSION, BENIGN: ICD-10-CM

## 2019-08-15 PROCEDURE — G0463 HOSPITAL OUTPT CLINIC VISIT: HCPCS

## 2019-08-15 PROCEDURE — 99213 OFFICE O/P EST LOW 20 MIN: CPT | Performed by: NURSE PRACTITIONER

## 2019-08-15 RX ORDER — CLARITHROMYCIN 500 MG
500 TABLET ORAL 2 TIMES DAILY
Qty: 28 TABLET | Refills: 0 | Status: SHIPPED | OUTPATIENT
Start: 2019-08-15 | End: 2019-09-05

## 2019-08-15 RX ORDER — LISINOPRIL 30 MG/1
30 TABLET ORAL DAILY
Qty: 90 TABLET | Refills: 1 | Status: SHIPPED | OUTPATIENT
Start: 2019-08-15 | End: 2019-08-23

## 2019-08-15 RX ORDER — FLUTICASONE PROPIONATE 50 MCG
1 SPRAY, SUSPENSION (ML) NASAL DAILY
Qty: 16 G | Refills: 5 | Status: SHIPPED | OUTPATIENT
Start: 2019-08-15 | End: 2019-11-08

## 2019-08-15 ASSESSMENT — PAIN SCALES - GENERAL: PAINLEVEL: MODERATE PAIN (5)

## 2019-08-15 ASSESSMENT — MIFFLIN-ST. JEOR: SCORE: 1542.21

## 2019-08-15 NOTE — PROGRESS NOTES
Subjective     Enzo Colin is a 84 year old male who presents to clinic today for the following health issues:    HPI   Acute Illness   Acute illness concerns: sinus  Onset: 2 months     Fever: no     Chills/Sweats: no     Headache (location?): YES    Sinus Pressure:YES    Conjunctivitis:  no    Ear Pain: no    Rhinorrhea: YES    Congestion: YES- phlegm    Sore Throat: no      Cough: YES    Wheeze: no     Decreased Appetite: no     Nausea: YES- in the morning    Vomiting: no     Diarrhea:  no     Dysuria/Freq.: no     Fatigue/Achiness: YES    Sick/Strep Exposure: no      Therapies Tried and outcome: nothing    Blood pressure is elevated today; he has been taking medications as ordered.        Patient Active Problem List   Diagnosis     Advanced care planning/counseling discussion     Benign prostatic hyperplasia with lower urinary tract symptoms, symptom details unspecified     Rosacea     Dyspnea     Heart murmur secondary to mitral, tricuspid, and pulmonary insufficiency.     History of tobacco abuse     Essential hypertension, benign     Chronic diastolic heart failure (H)     RBBB (right bundle branch block)     CKD (chronic kidney disease) stage 3, GFR 30-59 ml/min (H)     RUMA (generalized anxiety disorder)     Panic attack     Macular degeneration (senile) of retina     Past Surgical History:   Procedure Laterality Date     discectomy  1986     open reduction internal fixation  2012    Rotator cuff tear (right)     surgical  1996    Bladder cancer       Social History     Tobacco Use     Smoking status: Former Smoker     Types: Cigarettes     Smokeless tobacco: Former User     Tobacco comment: tried to quit yes, yr quit 1977   Substance Use Topics     Alcohol use: No     Family History   Problem Relation Age of Onset     Diabetes No family hx of      Hypertension No family hx of      Coronary Artery Disease No family hx of          Current Outpatient Medications   Medication Sig Dispense Refill     aspirin 81  "MG EC tablet Take 1 tablet (81 mg) by mouth daily 90 tablet 3     Cyanocobalamin (VITAMIN B-12 SL) Vitamin B Complex - Vitamin B12 1,200mcg/mL Sublingual Drops, take one po daily       doxycycline monohydrate 100 MG capsule Take 100 mg by mouth       Finasteride (PROSCAR PO) Take 5 mg by mouth daily       lisinopril (PRINIVIL/ZESTRIL) 20 MG tablet TAKE 1 TABLET DAILY 90 tablet 1     metroNIDAZOLE (METROCREAM) 0.75 % cream Apply by topical route 2 time every day a thin layer to the affected areas in the morning and evening       tamsulosin (FLOMAX) 0.4 MG capsule Take 0.4 mg by mouth       tretinoin (RETIN-A) 0.01 % topical gel        Allergies   Allergen Reactions     Amoxicillin Palpitations     Clindamycin Rash     Sulfa Drugs Rash     Sulfa(Sulfonamide Antibiotics)       Recent Labs   Lab Test 04/22/19  1603 06/07/18  1625   LDL 92  --    HDL 37*  --    TRIG 144  --    CR 1.31* 1.26*   GFRESTIMATED 49* 55*   GFRESTBLACK 57* 66   POTASSIUM 4.5 4.1   TSH 3.53  --       BP Readings from Last 3 Encounters:   08/15/19 (!) 154/74   04/22/19 138/76   07/19/18 136/66    Wt Readings from Last 3 Encounters:   08/15/19 86.2 kg (190 lb)   04/22/19 87 kg (191 lb 14.4 oz)   07/06/18 82.6 kg (182 lb)               Reviewed and updated as needed this visit by Provider         Review of Systems   ROS COMP: Constitutional, HEENT, cardiovascular, pulmonary, gi and gu systems are negative, except as otherwise noted.      Objective    BP (!) 154/74 (BP Location: Right arm, Patient Position: Sitting, Cuff Size: Adult Regular)   Pulse 68   Temp 97.1  F (36.2  C) (Tympanic)   Resp 14   Ht 1.753 m (5' 9\")   Wt 86.2 kg (190 lb)   SpO2 99%   BMI 28.06 kg/m    Body mass index is 28.06 kg/m .  Physical Exam   GENERAL: healthy, alert and no distress - appears younger than stated age.   HENT: normal cephalic/atraumatic, both ears: erythematous, nose and mouth without ulcers or lesions, nasal mucosa edematous  and rhinorrhea yellow, " "throat with yellow post nasal drip  NECK: no adenopathy, no asymmetry, masses, or scars and thyroid normal to palpation  RESP: lungs clear to auscultation - no rales, rhonchi or wheezes  CV: regular rate and rhythm, normal S1 S2, no S3 or S4, no murmur, click or rub, no peripheral edema and peripheral pulses strong  PSYCH: mentation appears normal, affect normal/bright            Assessment & Plan     1. Essential hypertension, benign  Dose increase   - lisinopril (PRINIVIL/ZESTRIL) 30 MG tablet; Take 1 tablet (30 mg) by mouth daily  Dispense: 90 tablet; Refill: 1    2. Acute sinusitis with symptoms greater than 10 days  Continue flonase  - Biaxin is ordered.   -- fluticasone (FLONASE) 50 MCG/ACT nasal spray; Spray 1 spray into both nostrils daily  Dispense: 16 g; Refill: 5     BMI:   Estimated body mass index is 28.06 kg/m  as calculated from the following:    Height as of this encounter: 1.753 m (5' 9\").    Weight as of this encounter: 86.2 kg (190 lb).     Follow up at scheduled pre-op in September as scheduled.         Eunice Dias NP  [facility]      "

## 2019-08-15 NOTE — TELEPHONE ENCOUNTER
Patient calls today received a script for amoxicillin today for sinus infection.      Documented allergy to this medication     Can you please forward another script to [facility] today?     Allergies include Amoxicillin, clindamycin, Sulfa drugs     Please advise

## 2019-08-15 NOTE — NURSING NOTE
"Chief Complaint   Patient presents with     Sinus Problem       Initial BP (!) 154/74 (BP Location: Right arm, Patient Position: Sitting, Cuff Size: Adult Regular)   Pulse 68   Temp 97.1  F (36.2  C) (Tympanic)   Resp 14   Ht 1.753 m (5' 9\")   Wt 86.2 kg (190 lb)   SpO2 99%   BMI 28.06 kg/m   Estimated body mass index is 28.06 kg/m  as calculated from the following:    Height as of this encounter: 1.753 m (5' 9\").    Weight as of this encounter: 86.2 kg (190 lb).  Medication Reconciliation: complete   Barbara Urbina      "

## 2019-08-15 NOTE — PATIENT INSTRUCTIONS
"    Assessment & Plan     1. Essential hypertension, benign  Dose increase   - lisinopril (PRINIVIL/ZESTRIL) 30 MG tablet; Take 1 tablet (30 mg) by mouth daily  Dispense: 90 tablet; Refill: 1    2. Acute sinusitis with symptoms greater than 10 days  Continue flonase  - amoxicillin-clavulanate (AUGMENTIN) 875-125 MG tablet; Take 1 tablet by mouth 2 times daily for 14 days  Dispense: 28 tablet; Refill: 0  - fluticasone (FLONASE) 50 MCG/ACT nasal spray; Spray 1 spray into both nostrils daily  Dispense: 16 g; Refill: 5     BMI:   Estimated body mass index is 28.06 kg/m  as calculated from the following:    Height as of this encounter: 1.753 m (5' 9\").    Weight as of this encounter: 86.2 kg (190 lb).     Follow up at scheduled pre-op on September.        Eunice Dias NP  [facility]        "

## 2019-08-21 DIAGNOSIS — I10 ESSENTIAL HYPERTENSION, BENIGN: ICD-10-CM

## 2019-08-21 NOTE — TELEPHONE ENCOUNTER
Pt would like a script [facility]     Lisinopril  Last Written Prescription Date: 8/15/19-did not fill d/t price  Last Fill Quantity: 90 # of Refills: 3  Last Office Visit: 8/15/19

## 2019-08-23 RX ORDER — LISINOPRIL 30 MG/1
30 TABLET ORAL DAILY
Qty: 90 TABLET | Refills: 1 | Status: SHIPPED | OUTPATIENT
Start: 2019-08-23 | End: 2020-02-03

## 2019-08-28 NOTE — PROGRESS NOTES
[facility]  [address]  [place] [address]  [phone]  Dept: [phone]    PRE-OP EVALUATION:  Today's date: 2019    Enzo Colin (: 10/10/1934) presents for pre-operative evaluation assessment as requested by Dr. Neal.  He requires evaluation and anesthesia risk assessment prior to undergoing surgery/procedure for treatment of prostatic hyperplasia .    Proposed Surgery/ Procedure: bipolar transurethral resection of prostate  Date of Surgery/ Procedure: 19  Time of Surgery/ Procedure: D  Hospital/Surgical Facility: [place], [place]  Fax number for surgical facility:   Primary Physician: Eunice Dias  Type of Anesthesia Anticipated: to be determined    Patient has a Health Care Directive or Living Will:  YES     1. NO - Do you have a history of heart attack, stroke, stent, bypass or surgery on an artery in the head, neck, heart or legs?  2. NO - Do you ever have any pain or discomfort in your chest?  3. NO - Do you have a history of  Heart Failure?  4. NO - Are you troubled by shortness of breath when: walking on the level, up a slight hill or at night?  5. NO - Do you currently have a cold, bronchitis or other respiratory infection?  6. NO - Do you have a cough, shortness of breath or wheezing?  7. NO - Do you sometimes get pains in the calves of your legs when you walk?  8. NO - Do you or anyone in your family have previous history of blood clots?  9. NO - Do you or does anyone in your family have a serious bleeding problem such as prolonged bleeding following surgeries or cuts?  10. NO - Have you ever had problems with anemia or been told to take iron pills?  11. NO - Have you had any abnormal blood loss such as black, tarry or bloody stools, or abnormal vaginal bleeding?  12. NO - Have you ever had a blood transfusion?  13. NO - Have you or any of your relatives ever had problems with anesthesia?  14. NO - Do you have sleep  apnea, excessive snoring or daytime drowsiness?  15. NO - Do you have any prosthetic heart valves?  16. NO - Do you have prosthetic joints?  17. NO - Is there any chance that you may be pregnant?      HPI:     HPI related to upcoming procedure: longstanding history of hyperplasia of prostate with lower urinary symptoms.  He does have a remote history of bladder cancer.        CHF - Patient has a longstanding history of moderate-severe CHF. Exacerbating conditions include hypertension. Currently the patient's condition is same. Current treatment regimen includes ACE inhibitor. The patient denies chest pain, edema, orthopnea, SOB or recent weight gain. Last Echocardiogram 6/15/2019, EKG today.     HYPERTENSION - Patient has longstanding history of HTN , currently denies any symptoms referable to elevated blood pressure. Specifically denies chest pain, palpitations, dyspnea, orthopnea, PND or peripheral edema. Blood pressure readings have been in normal range. Current medication regimen is as listed below. Patient denies any side effects of medication.   BP Readings from Last 3 Encounters:   09/05/19 139/62   08/15/19 (!) 154/74   04/22/19 138/76         RENAL INSUFFICIENCY - Patient has a longstanding history of moderate-severe chronic renal insufficiency. Last Cr listed below.       MEDICAL HISTORY:     Patient Active Problem List    Diagnosis Date Noted     Dyspnea 07/06/2018     Priority: Medium     Heart murmur secondary to mitral, tricuspid, and pulmonary insufficiency. 07/06/2018     Priority: Medium     History of tobacco abuse 07/06/2018     Priority: Medium     Essential hypertension, benign 07/06/2018     Priority: Medium     Chronic diastolic heart failure (H) 07/06/2018     Priority: Medium     RBBB (right bundle branch block) 07/06/2018     Priority: Medium     CKD (chronic kidney disease) stage 3, GFR 30-59 ml/min (H) 07/06/2018     Priority: Medium     RUMA (generalized anxiety disorder) 07/06/2018      Priority: Medium     Panic attack 07/06/2018     Priority: Medium     Macular degeneration (senile) of retina 07/06/2018     Priority: Medium     Benign prostatic hyperplasia with lower urinary tract symptoms, symptom details unspecified 01/29/2018     Priority: Medium     Rosacea 01/29/2018     Priority: Medium     Advanced care planning/counseling discussion 08/03/2012     Priority: Medium      Past Medical History:   Diagnosis Date     Sprain of unspecified site of knee and leg 09/07/2000     Past Surgical History:   Procedure Laterality Date     discectomy  1986     open reduction internal fixation  2012    Rotator cuff tear (right)     surgical  1996    Bladder cancer     Current Outpatient Medications   Medication Sig Dispense Refill     Cyanocobalamin (VITAMIN B-12 SL) Vitamin B Complex - Vitamin B12 1,200mcg/mL Sublingual Drops, take one po daily       doxycycline monohydrate 100 MG capsule Take 100 mg by mouth       Finasteride (PROSCAR PO) Take 5 mg by mouth daily       fluticasone (FLONASE) 50 MCG/ACT nasal spray Spray 1 spray into both nostrils daily 16 g 5     lisinopril (PRINIVIL/ZESTRIL) 30 MG tablet Take 1 tablet (30 mg) by mouth daily 90 tablet 1     metroNIDAZOLE (METROCREAM) 0.75 % cream Apply by topical route 2 time every day a thin layer to the affected areas in the morning and evening       tamsulosin (FLOMAX) 0.4 MG capsule Take 0.4 mg by mouth       tretinoin (RETIN-A) 0.01 % topical gel        aspirin 81 MG EC tablet Take 1 tablet (81 mg) by mouth daily 90 tablet 3     OTC products: None, except as noted above, no recent use of OTC ASA, NSAIDS or Steroids and no use of herbal medications or other supplements    Allergies   Allergen Reactions     Amoxicillin Palpitations     Clindamycin Rash     Sulfa Drugs Rash     Sulfa(Sulfonamide Antibiotics)        Latex Allergy: NO    Social History     Tobacco Use     Smoking status: Former Smoker     Types: Cigarettes     Smokeless tobacco: Former  "User     Tobacco comment: tried to quit yes, yr quit 1977   Substance Use Topics     Alcohol use: No     History   Drug Use No       REVIEW OF SYSTEMS:   CONSTITUTIONAL: NEGATIVE for fever, chills, change in weight  INTEGUMENTARY/SKIN: NEGATIVE for worrisome rashes, moles or lesions  EYES: NEGATIVE for vision changes or irritation  ENT/MOUTH: NEGATIVE for ear, mouth and throat problems  RESP: NEGATIVE for significant cough or SOB  BREAST: NEGATIVE for masses, tenderness or discharge  CV: NEGATIVE for chest pain, palpitations or peripheral edema  GI: NEGATIVE for nausea, abdominal pain, heartburn, or change in bowel habits   male :frequency and urgency   MUSCULOSKELETAL: NEGATIVE for significant arthralgias or myalgia  NEURO: NEGATIVE for weakness, dizziness or paresthesias  ENDOCRINE: NEGATIVE for temperature intolerance, skin/hair changes  HEME: NEGATIVE for bleeding problems  PSYCHIATRIC: NEGATIVE for changes in mood or affect    EXAM:   /62 (BP Location: Right arm, Patient Position: Sitting, Cuff Size: Adult Regular)   Pulse 71   Temp 97.9  F (36.6  C) (Tympanic)   Resp 20   Ht 1.753 m (5' 9\")   Wt 84.8 kg (187 lb)   SpO2 98%   BMI 27.62 kg/m      GENERAL APPEARANCE: healthy, alert and no distress     EYES: EOMI,  PERRL     HENT: ear canals and TM's normal and nose and mouth without ulcers or lesions     NECK: no adenopathy, no asymmetry, masses, or scars and thyroid normal to palpation     RESP: lungs clear to auscultation - no rales, rhonchi or wheezes     CV: regular rates and rhythm, normal S1 S2, no S3 or S4 and no murmur, click or rub     ABDOMEN:  soft, nontender, no HSM or masses and bowel sounds normal     MS: extremities normal- no gross deformities noted, no evidence of inflammation in joints, FROM in all extremities.     SKIN: no suspicious lesions or rashes     NEURO: Normal strength and tone, sensory exam grossly normal, mentation intact and speech normal     PSYCH: mentation appears " normal. and affect normal/bright     LYMPHATICS: No cervical adenopathy    DIAGNOSTICS:     Results for orders placed or performed in visit on 09/05/19   CBC with platelets   Result Value Ref Range    WBC 6.9 4.0 - 11.0 10e9/L    RBC Count 4.46 4.4 - 5.9 10e12/L    Hemoglobin 13.6 13.3 - 17.7 g/dL    Hematocrit 42.4 40.0 - 53.0 %    MCV 95 78 - 100 fl    MCH 30.5 26.5 - 33.0 pg    MCHC 32.1 31.5 - 36.5 g/dL    RDW 14.7 10.0 - 15.0 %    Platelet Count 171 150 - 450 10e9/L   Basic metabolic panel   Result Value Ref Range    Sodium 142 133 - 144 mmol/L    Potassium 4.6 3.4 - 5.3 mmol/L    Chloride 111 (H) 94 - 109 mmol/L    Carbon Dioxide 24 20 - 32 mmol/L    Anion Gap 7 3 - 14 mmol/L    Glucose 110 (H) 70 - 99 mg/dL    Urea Nitrogen 21 7 - 30 mg/dL    Creatinine 1.45 (H) 0.66 - 1.25 mg/dL    GFR Estimate 44 (L) >60 mL/min/[1.73_m2]    GFR Estimate If Black 51 (L) >60 mL/min/[1.73_m2]    Calcium 8.3 (L) 8.5 - 10.1 mg/dL   *UA reflex to Microscopic and Culture - [facility]   Result Value Ref Range    Color Urine Yellow     Appearance Urine Clear     Glucose Urine Negative NEG^Negative mg/dL    Bilirubin Urine Negative NEG^Negative    Ketones Urine Negative NEG^Negative mg/dL    Specific Gravity Urine 1.010 1.003 - 1.035    Blood Urine Negative NEG^Negative    pH Urine 7.0 5.0 - 7.0 pH    Protein Albumin Urine Negative NEG^Negative mg/dL    Urobilinogen Urine 0.2 0.2 - 1.0 EU/dL    Nitrite Urine Negative NEG^Negative    Leukocyte Esterase Urine Large (A) NEG^Negative    Source Midstream Urine    Urine Microscopic   Result Value Ref Range    WBC Urine 10-25 (A) OTO5^0 - 5 /HPF    RBC Urine O - 2 OTO2^O - 2 /HPF   Urine Culture Aerobic Bacterial   Result Value Ref Range    Specimen Description Midstream Urine     Culture Micro       <10,000 colonies/mL  No further identification or sensitivity done           Recent Labs   Lab Test 04/22/19  1603 06/07/18  1625   HGB  --  14.1   PLT  --  180    140   POTASSIUM  4.5 4.1   CR 1.31* 1.26*      EKG completed, NSR with premature supraventricular complex,  no acute ST changes noted.  Unchanged from 2018.    PROCEDURE: NM MPI WITH LEXISCAN 6/19/2018 10:43 AM     HISTORY: ; Dyspnea on exertion     COMPARISONS: None.     TECHNIQUE: At rest 10 mCi of technetium 99m sestamibi was injected.  The patient was stressed and 30 mCi of technetium 99m sestamibi was  injected.     FINDINGS: On the rest and stress images there was a normal  distribution of tracer activity throughout the myocardium. There was  no evidence of microischemia. The rest gated images reveal the end  diastolic volume to be 80 miles. The end-systolic volume was 23 mL's.  Calculated ejection fraction was 71%.                                                                        IMPRESSION:   1. No evidence of myocardial ischemia.  2. Normal left ventricular function     MANOLO HAIR MD  IMPRESSION:   Reason for surgery/procedure: benign hypertrophy of prostate   Diagnosis/reason for consult: anesthesia risk assessment     The proposed surgical procedure is considered INTERMEDIATE risk.    REVISED CARDIAC RISK INDEX  The patient has the following serious cardiovascular risks for perioperative complications such as     INTERPRETATION: 1 risks: Class II (low risk - 0.9% complication rate)    The patient has the following additional risks for perioperative complications:  No identified additional risks      ICD-10-CM    1. Preop general physical exam Z01.818 EKG 12-lead complete w/read - (Clinic Performed)     CBC with platelets     Basic metabolic panel     *UA reflex to Microscopic and Culture - [facility]     Urine Microscopic   2. Benign prostatic hyperplasia with lower urinary tract symptoms, symptom details unspecified N40.1    3. Essential hypertension, benign I10    4. CKD (chronic kidney disease) stage 3, GFR 30-59 ml/min (H) N18.3    5. Chronic diastolic heart failure (H) I50.32    6. Abnormal findings  on microbiological examination of urine R82.79 Urine Culture Aerobic Bacterial       RECOMMENDATIONS:       Cardiovascular Risk  Performs 4 METs exercise without symptoms (Climb a flight of stairs) .       --Patient is to HOLD all scheduled medications on the day of surgery EXCEPT for modifications listed below.    APPROVAL GIVEN to proceed with proposed procedure, without further diagnostic evaluation       Signed Electronically by: Eunice Dias NP    Copy of this evaluation report is provided to requesting physician.    [facility] Preop Guidelines    Revised Cardiac Risk Index

## 2019-09-05 ENCOUNTER — OFFICE VISIT (OUTPATIENT)
Dept: FAMILY MEDICINE | Facility: OTHER | Age: 84
End: 2019-09-05
Attending: NURSE PRACTITIONER
Payer: MEDICARE

## 2019-09-05 VITALS
DIASTOLIC BLOOD PRESSURE: 62 MMHG | BODY MASS INDEX: 27.7 KG/M2 | SYSTOLIC BLOOD PRESSURE: 139 MMHG | HEART RATE: 71 BPM | HEIGHT: 69 IN | WEIGHT: 187 LBS | RESPIRATION RATE: 20 BRPM | TEMPERATURE: 97.9 F | OXYGEN SATURATION: 98 %

## 2019-09-05 DIAGNOSIS — I10 ESSENTIAL HYPERTENSION, BENIGN: ICD-10-CM

## 2019-09-05 DIAGNOSIS — N18.30 CKD (CHRONIC KIDNEY DISEASE) STAGE 3, GFR 30-59 ML/MIN (H): ICD-10-CM

## 2019-09-05 DIAGNOSIS — I50.32 CHRONIC DIASTOLIC HEART FAILURE (H): ICD-10-CM

## 2019-09-05 DIAGNOSIS — N40.1 BENIGN PROSTATIC HYPERPLASIA WITH LOWER URINARY TRACT SYMPTOMS, SYMPTOM DETAILS UNSPECIFIED: ICD-10-CM

## 2019-09-05 DIAGNOSIS — Z01.818 PREOP GENERAL PHYSICAL EXAM: Primary | ICD-10-CM

## 2019-09-05 DIAGNOSIS — R82.79 ABNORMAL FINDINGS ON MICROBIOLOGICAL EXAMINATION OF URINE: ICD-10-CM

## 2019-09-05 LAB
ALBUMIN UR-MCNC: NEGATIVE MG/DL
ANION GAP SERPL CALCULATED.3IONS-SCNC: 7 MMOL/L (ref 3–14)
APPEARANCE UR: CLEAR
BILIRUB UR QL STRIP: NEGATIVE
BUN SERPL-MCNC: 21 MG/DL (ref 7–30)
CALCIUM SERPL-MCNC: 8.3 MG/DL (ref 8.5–10.1)
CHLORIDE SERPL-SCNC: 111 MMOL/L (ref 94–109)
CO2 SERPL-SCNC: 24 MMOL/L (ref 20–32)
COLOR UR AUTO: YELLOW
CREAT SERPL-MCNC: 1.45 MG/DL (ref 0.66–1.25)
ERYTHROCYTE [DISTWIDTH] IN BLOOD BY AUTOMATED COUNT: 14.7 % (ref 10–15)
GFR SERPL CREATININE-BSD FRML MDRD: 44 ML/MIN/{1.73_M2}
GLUCOSE SERPL-MCNC: 110 MG/DL (ref 70–99)
GLUCOSE UR STRIP-MCNC: NEGATIVE MG/DL
HCT VFR BLD AUTO: 42.4 % (ref 40–53)
HGB BLD-MCNC: 13.6 G/DL (ref 13.3–17.7)
HGB UR QL STRIP: NEGATIVE
KETONES UR STRIP-MCNC: NEGATIVE MG/DL
LEUKOCYTE ESTERASE UR QL STRIP: ABNORMAL
MCH RBC QN AUTO: 30.5 PG (ref 26.5–33)
MCHC RBC AUTO-ENTMCNC: 32.1 G/DL (ref 31.5–36.5)
MCV RBC AUTO: 95 FL (ref 78–100)
NITRATE UR QL: NEGATIVE
PH UR STRIP: 7 PH (ref 5–7)
PLATELET # BLD AUTO: 171 10E9/L (ref 150–450)
POTASSIUM SERPL-SCNC: 4.6 MMOL/L (ref 3.4–5.3)
RBC # BLD AUTO: 4.46 10E12/L (ref 4.4–5.9)
RBC #/AREA URNS AUTO: ABNORMAL /HPF
SODIUM SERPL-SCNC: 142 MMOL/L (ref 133–144)
SOURCE: ABNORMAL
SP GR UR STRIP: 1.01 (ref 1–1.03)
UROBILINOGEN UR STRIP-ACNC: 0.2 EU/DL (ref 0.2–1)
WBC # BLD AUTO: 6.9 10E9/L (ref 4–11)
WBC #/AREA URNS AUTO: ABNORMAL /HPF

## 2019-09-05 PROCEDURE — 93005 ELECTROCARDIOGRAM TRACING: CPT

## 2019-09-05 PROCEDURE — 87086 URINE CULTURE/COLONY COUNT: CPT | Mod: ZL | Performed by: NURSE PRACTITIONER

## 2019-09-05 PROCEDURE — 93010 ELECTROCARDIOGRAM REPORT: CPT | Performed by: INTERNAL MEDICINE

## 2019-09-05 PROCEDURE — G0463 HOSPITAL OUTPT CLINIC VISIT: HCPCS

## 2019-09-05 PROCEDURE — 81001 URINALYSIS AUTO W/SCOPE: CPT | Mod: ZL | Performed by: NURSE PRACTITIONER

## 2019-09-05 PROCEDURE — 36415 COLL VENOUS BLD VENIPUNCTURE: CPT | Mod: ZL | Performed by: NURSE PRACTITIONER

## 2019-09-05 PROCEDURE — 85027 COMPLETE CBC AUTOMATED: CPT | Mod: ZL | Performed by: NURSE PRACTITIONER

## 2019-09-05 PROCEDURE — 80048 BASIC METABOLIC PNL TOTAL CA: CPT | Mod: ZL | Performed by: NURSE PRACTITIONER

## 2019-09-05 PROCEDURE — G0463 HOSPITAL OUTPT CLINIC VISIT: HCPCS | Mod: 25

## 2019-09-05 PROCEDURE — 99214 OFFICE O/P EST MOD 30 MIN: CPT | Performed by: NURSE PRACTITIONER

## 2019-09-05 ASSESSMENT — PAIN SCALES - GENERAL: PAINLEVEL: NO PAIN (0)

## 2019-09-05 ASSESSMENT — MIFFLIN-ST. JEOR: SCORE: 1528.61

## 2019-09-05 NOTE — NURSING NOTE
"Chief Complaint   Patient presents with     Pre-Op Exam       Initial /62 (BP Location: Right arm, Patient Position: Sitting, Cuff Size: Adult Regular)   Pulse 71   Temp 97.9  F (36.6  C) (Tympanic)   Resp 20   Ht 1.753 m (5' 9\")   Wt 84.8 kg (187 lb)   SpO2 98%   BMI 27.62 kg/m   Estimated body mass index is 27.62 kg/m  as calculated from the following:    Height as of this encounter: 1.753 m (5' 9\").    Weight as of this encounter: 84.8 kg (187 lb).  Medication Reconciliation: complete   Tricia Walker LPN    "

## 2019-09-07 LAB
BACTERIA SPEC CULT: NORMAL
SPECIMEN SOURCE: NORMAL

## 2019-09-09 ENCOUNTER — TELEPHONE (OUTPATIENT)
Dept: FAMILY MEDICINE | Facility: OTHER | Age: 84
End: 2019-09-09

## 2019-09-26 ENCOUNTER — TRANSFERRED RECORDS (OUTPATIENT)
Dept: HEALTH INFORMATION MANAGEMENT | Facility: CLINIC | Age: 84
End: 2019-09-26

## 2019-10-03 ENCOUNTER — TELEPHONE (OUTPATIENT)
Dept: FAMILY MEDICINE | Facility: OTHER | Age: 84
End: 2019-10-03

## 2019-10-03 NOTE — TELEPHONE ENCOUNTER
10:53 AM    Reason for Call: Phone Call    Description: pt called and wanted Marlon to know his surgery last week went very well    Was an appointment offered for this call? no  If yes : Appointment type              Date    Preferred method for responding to this message: telephone  What is your phone number ?[phone]    If we cannot reach you directly, may we leave a detailed response at the number you provided?  yes    Can this message wait until your PCP/provider returns, if available today? darrin Keith

## 2019-10-17 NOTE — PROGRESS NOTES
Subjective     Enzo Colin is a 85 year old male who presents to clinic today for the following health issues:    HPI   Hypertension Follow-up      Do you check your blood pressure regularly outside of the clinic? Not recently    Are you following a low salt diet? No    Are your blood pressures ever more than 140 on the top number (systolic) OR more   than 90 on the bottom number (diastolic), for example 140/90? Yes  Chronic Kidney Disease Follow-up      Current NSAID use?  No        How many servings of fruits and vegetables do you eat daily?  2-3    On average, how many sweetened beverages do you drink each day (soda, juice, sweet tea, etc)?   0    How many days per week do you miss taking your medication? 0    The ASCVD Risk score (Westonjr DAIZ Jr., et al., 2013) failed to calculate for the following reasons:    The 2013 ASCVD risk score is only valid for ages 40 to 79      He had pre-op labs last month, lipids in April.  He is feeling well and does not have any new concerns today.      He had prostate resection and biopsy in September, doing well and continues following with urology.      Patient Active Problem List   Diagnosis     Advanced care planning/counseling discussion     Benign prostatic hyperplasia with lower urinary tract symptoms, symptom details unspecified     Rosacea     Dyspnea     Heart murmur secondary to mitral, tricuspid, and pulmonary insufficiency.     History of tobacco abuse     Essential hypertension, benign     Chronic diastolic heart failure (H)     RBBB (right bundle branch block)     CKD (chronic kidney disease) stage 3, GFR 30-59 ml/min (H)     RUMA (generalized anxiety disorder)     Panic attack     Macular degeneration (senile) of retina     Past Surgical History:   Procedure Laterality Date     discectomy  1986     open reduction internal fixation  2012    Rotator cuff tear (right)     surgical  1996    Bladder cancer       Social History     Tobacco Use     Smoking status: Former  Smoker     Packs/day: 0.00     Types: Cigarettes     Smokeless tobacco: Former User     Tobacco comment: tried to quit yes, yr quit 1977   Substance Use Topics     Alcohol use: No     Family History   Problem Relation Age of Onset     Diabetes No family hx of      Hypertension No family hx of      Coronary Artery Disease No family hx of          Current Outpatient Medications   Medication Sig Dispense Refill     aspirin 81 MG EC tablet Take 1 tablet (81 mg) by mouth daily 90 tablet 3     Cyanocobalamin (VITAMIN B-12 SL) Vitamin B Complex - Vitamin B12 1,200mcg/mL Sublingual Drops, take one po daily       doxycycline monohydrate 100 MG capsule Take 100 mg by mouth       fluticasone (FLONASE) 50 MCG/ACT nasal spray Spray 1 spray into both nostrils daily 16 g 5     lisinopril (PRINIVIL/ZESTRIL) 30 MG tablet Take 1 tablet (30 mg) by mouth daily 90 tablet 1     metroNIDAZOLE (METROCREAM) 0.75 % cream Apply by topical route 2 time every day a thin layer to the affected areas in the morning and evening       tretinoin (RETIN-A) 0.01 % topical gel        Allergies   Allergen Reactions     Amoxicillin Palpitations     Clindamycin Rash     Sulfa Drugs Rash     Sulfa(Sulfonamide Antibiotics)       Recent Labs   Lab Test 09/05/19  1357 04/22/19  1603   LDL  --  92   HDL  --  37*   TRIG  --  144   CR 1.45* 1.31*   GFRESTIMATED 44* 49*   GFRESTBLACK 51* 57*   POTASSIUM 4.6 4.5   TSH  --  3.53      BP Readings from Last 3 Encounters:   10/23/19 132/66   09/05/19 139/62   08/15/19 (!) 154/74    Wt Readings from Last 3 Encounters:   10/23/19 85.8 kg (189 lb 1.6 oz)   09/05/19 84.8 kg (187 lb)   08/15/19 86.2 kg (190 lb)              Reviewed and updated as needed this visit by Provider         Review of Systems   ROS COMP: Constitutional, HEENT, cardiovascular, pulmonary, gi and gu systems are negative, except as otherwise noted.      Objective    /66 (BP Location: Left arm, Patient Position: Sitting, Cuff Size: Adult Regular)  "  Pulse 77   Temp 98  F (36.7  C) (Tympanic)   Ht 1.753 m (5' 9\")   Wt 85.8 kg (189 lb 1.6 oz)   SpO2 98%   BMI 27.93 kg/m    Body mass index is 27.93 kg/m .  Physical Exam   GENERAL: healthy, alert and no distress  NECK: no adenopathy, no asymmetry, masses, or scars, thyroid normal to palpation and no carotid bruits  RESP: lungs clear to auscultation - no rales, rhonchi or wheezes  CV: irregular rates and rhythm, known murmur, peripheral pulses strong and no peripheral edema  MS: no gross musculoskeletal defects noted, no edema  PSYCH: mentation appears normal, affect normal/bright            Assessment & Plan     1. Essential hypertension, benign  Continue current plan    2. Chronic diastolic heart failure (H)  Do not use NSAIDS (ibuprofen or naproxen)  Tylenol is ok     3. CKD (chronic kidney disease) stage 3, GFR 30-59 ml/min (H)  As above        BMI:   Estimated body mass index is 27.93 kg/m  as calculated from the following:    Height as of this encounter: 1.753 m (5' 9\").    Weight as of this encounter: 85.8 kg (189 lb 1.6 oz).           Return in about 6 months (around 4/23/2020) for hypertension.    Eunice Dias NP  [facility]      "

## 2019-10-23 ENCOUNTER — OFFICE VISIT (OUTPATIENT)
Dept: FAMILY MEDICINE | Facility: OTHER | Age: 84
End: 2019-10-23
Attending: NURSE PRACTITIONER
Payer: MEDICARE

## 2019-10-23 VITALS
HEART RATE: 77 BPM | BODY MASS INDEX: 28.01 KG/M2 | DIASTOLIC BLOOD PRESSURE: 66 MMHG | SYSTOLIC BLOOD PRESSURE: 132 MMHG | OXYGEN SATURATION: 98 % | HEIGHT: 69 IN | WEIGHT: 189.1 LBS | TEMPERATURE: 98 F

## 2019-10-23 DIAGNOSIS — N18.30 CKD (CHRONIC KIDNEY DISEASE) STAGE 3, GFR 30-59 ML/MIN (H): ICD-10-CM

## 2019-10-23 DIAGNOSIS — I10 ESSENTIAL HYPERTENSION, BENIGN: Primary | ICD-10-CM

## 2019-10-23 DIAGNOSIS — I50.32 CHRONIC DIASTOLIC HEART FAILURE (H): ICD-10-CM

## 2019-10-23 PROCEDURE — G0463 HOSPITAL OUTPT CLINIC VISIT: HCPCS

## 2019-10-23 PROCEDURE — 99214 OFFICE O/P EST MOD 30 MIN: CPT | Performed by: NURSE PRACTITIONER

## 2019-10-23 ASSESSMENT — MIFFLIN-ST. JEOR: SCORE: 1533.13

## 2019-10-23 ASSESSMENT — PAIN SCALES - GENERAL: PAINLEVEL: NO PAIN (0)

## 2019-10-23 NOTE — PATIENT INSTRUCTIONS
"        Assessment & Plan     1. Essential hypertension, benign  Continue current plan    2. Chronic diastolic heart failure (H)  Do not use NSAIDS (ibuprofen or naproxen)  Tylenol is ok     3. CKD (chronic kidney disease) stage 3, GFR 30-59 ml/min (H)  As above        BMI:   Estimated body mass index is 27.93 kg/m  as calculated from the following:    Height as of this encounter: 1.753 m (5' 9\").    Weight as of this encounter: 85.8 kg (189 lb 1.6 oz).           Return in about 6 months (around 4/23/2020) for hypertension.    Eunice Dias, NP  [facility]      "

## 2019-11-08 DIAGNOSIS — J01.90 ACUTE SINUSITIS WITH SYMPTOMS GREATER THAN 10 DAYS: ICD-10-CM

## 2019-11-08 RX ORDER — FLUTICASONE PROPIONATE 50 MCG
1 SPRAY, SUSPENSION (ML) NASAL DAILY
Qty: 16 G | Refills: 5 | Status: SHIPPED | OUTPATIENT
Start: 2019-11-08 | End: 2021-02-15 | Stop reason: ALTCHOICE

## 2019-11-08 NOTE — TELEPHONE ENCOUNTER
Fluticasone  Last Written Prescription Date: 8/15/19  Last Fill Quantity: 1 # of Refills: 5  Last Office Visit: 10/23/19

## 2019-12-05 ENCOUNTER — OFFICE VISIT (OUTPATIENT)
Dept: FAMILY MEDICINE | Facility: OTHER | Age: 84
End: 2019-12-05
Attending: NURSE PRACTITIONER
Payer: MEDICARE

## 2019-12-05 ENCOUNTER — TELEPHONE (OUTPATIENT)
Dept: FAMILY MEDICINE | Facility: OTHER | Age: 84
End: 2019-12-05

## 2019-12-05 VITALS
TEMPERATURE: 98.2 F | BODY MASS INDEX: 28.75 KG/M2 | RESPIRATION RATE: 18 BRPM | HEIGHT: 69 IN | SYSTOLIC BLOOD PRESSURE: 142 MMHG | HEART RATE: 66 BPM | WEIGHT: 194.1 LBS | DIASTOLIC BLOOD PRESSURE: 78 MMHG | OXYGEN SATURATION: 98 %

## 2019-12-05 DIAGNOSIS — J01.01 ACUTE RECURRENT MAXILLARY SINUSITIS: Primary | ICD-10-CM

## 2019-12-05 PROCEDURE — G0463 HOSPITAL OUTPT CLINIC VISIT: HCPCS

## 2019-12-05 PROCEDURE — 99213 OFFICE O/P EST LOW 20 MIN: CPT | Performed by: NURSE PRACTITIONER

## 2019-12-05 RX ORDER — OXYBUTYNIN CHLORIDE 5 MG/1
5 TABLET, EXTENDED RELEASE ORAL
COMMUNITY
Start: 2019-11-27 | End: 2021-07-07

## 2019-12-05 RX ORDER — CLARITHROMYCIN 500 MG
500 TABLET ORAL 2 TIMES DAILY
Qty: 28 TABLET | Refills: 0 | Status: SHIPPED | OUTPATIENT
Start: 2019-12-05 | End: 2020-04-03

## 2019-12-05 ASSESSMENT — PAIN SCALES - GENERAL: PAINLEVEL: NO PAIN (0)

## 2019-12-05 ASSESSMENT — MIFFLIN-ST. JEOR: SCORE: 1555.81

## 2019-12-05 NOTE — PATIENT INSTRUCTIONS
"        Assessment & Plan     1. Acute recurrent maxillary sinusitis  Stop doxycycline while taking clarithromycin  Start over the counter zyrtec daily  - clarithromycin (BIAXIN) 500 MG tablet; Take 1 tablet (500 mg) by mouth 2 times daily for 14 days  Dispense: 28 tablet; Refill: 0  - OTOLARYNGOLOGY REFERRAL - evaluation.      BMI:   Estimated body mass index is 28.66 kg/m  as calculated from the following:    Height as of this encounter: 1.753 m (5' 9\").    Weight as of this encounter: 88 kg (194 lb 1.6 oz).           Return if symptoms worsen or fail to improve.    Eunice Dias, NP  [facility]      "

## 2019-12-05 NOTE — PROGRESS NOTES
Subjective     Enzo Colin is a 85 year old male who presents to clinic today for the following health issues:    HPI   Acute Illness   Acute illness concerns: cough sinus pressure   Onset: about a week    Fever: no     Chills/Sweats: no     Headache (location?): YES    Sinus Pressure:YES    Conjunctivitis:  no    Ear Pain: no    Rhinorrhea: YES    Congestion: YES    Sore Throat: YES     Cough: YES-productive of yellow sputum in the morning and clear during the day.     Wheeze: YES    Decreased Appetite: no     Nausea: no     Vomiting: no     Diarrhea:  no     Dysuria/Freq.: no     Fatigue/Achiness: YES- fatigue     Sick/Strep Exposure: no      Therapies Tried and outcome: cough medication otc and tylenol       Patient Active Problem List   Diagnosis     Advanced care planning/counseling discussion     Benign prostatic hyperplasia with lower urinary tract symptoms, symptom details unspecified     Rosacea     Dyspnea     Heart murmur secondary to mitral, tricuspid, and pulmonary insufficiency.     History of tobacco abuse     Essential hypertension, benign     Chronic diastolic heart failure (H)     RBBB (right bundle branch block)     CKD (chronic kidney disease) stage 3, GFR 30-59 ml/min (H)     RUMA (generalized anxiety disorder)     Panic attack     Macular degeneration (senile) of retina     Past Surgical History:   Procedure Laterality Date     discectomy  1986     open reduction internal fixation  2012    Rotator cuff tear (right)     surgical  1996    Bladder cancer       Social History     Tobacco Use     Smoking status: Former Smoker     Packs/day: 0.00     Types: Cigarettes     Smokeless tobacco: Former User     Tobacco comment: tried to quit yes, yr quit 1977   Substance Use Topics     Alcohol use: No     Family History   Problem Relation Age of Onset     Diabetes No family hx of      Hypertension No family hx of      Coronary Artery Disease No family hx of          Current Outpatient Medications  "  Medication Sig Dispense Refill     aspirin 81 MG EC tablet Take 1 tablet (81 mg) by mouth daily 90 tablet 3     clarithromycin (BIAXIN) 500 MG tablet Take 1 tablet (500 mg) by mouth 2 times daily for 14 days 28 tablet 0     Cyanocobalamin (VITAMIN B-12 SL) Vitamin B Complex - Vitamin B12 1,200mcg/mL Sublingual Drops, take one po daily       doxycycline monohydrate 100 MG capsule Take 100 mg by mouth       fluticasone (FLONASE) 50 MCG/ACT nasal spray Spray 1 spray into both nostrils daily 16 g 5     lisinopril (PRINIVIL/ZESTRIL) 30 MG tablet Take 1 tablet (30 mg) by mouth daily 90 tablet 1     metroNIDAZOLE (METROCREAM) 0.75 % cream Apply by topical route 2 time every day a thin layer to the affected areas in the morning and evening       oxybutynin ER (DITROPAN-XL) 5 MG 24 hr tablet Take 5 mg by mouth       tretinoin (RETIN-A) 0.01 % topical gel        Allergies   Allergen Reactions     Amoxicillin Palpitations     Clindamycin Rash     Sulfa Drugs Rash     Sulfa(Sulfonamide Antibiotics)       Recent Labs   Lab Test 09/05/19  1357 04/22/19  1603   LDL  --  92   HDL  --  37*   TRIG  --  144   CR 1.45* 1.31*   GFRESTIMATED 44* 49*   GFRESTBLACK 51* 57*   POTASSIUM 4.6 4.5   TSH  --  3.53      BP Readings from Last 3 Encounters:   12/05/19 (!) 142/78   10/23/19 132/66   09/05/19 139/62    Wt Readings from Last 3 Encounters:   12/05/19 88 kg (194 lb 1.6 oz)   10/23/19 85.8 kg (189 lb 1.6 oz)   09/05/19 84.8 kg (187 lb)              Reviewed and updated as needed this visit by Provider  Allergies         Review of Systems   ROS COMP: Constitutional, HEENT, cardiovascular, pulmonary, gi and gu systems are negative, except as otherwise noted.      Objective    BP (!) 142/78 (BP Location: Left arm, Patient Position: Chair, Cuff Size: Adult Regular)   Pulse 66   Temp 98.2  F (36.8  C) (Tympanic)   Resp 18   Ht 1.753 m (5' 9\")   Wt 88 kg (194 lb 1.6 oz)   SpO2 98%   BMI 28.66 kg/m    Body mass index is 28.66 " "kg/m .  Physical Exam   GENERAL: healthy, alert and no distress  HENT: normal cephalic/atraumatic, both ears: normal: no effusions, no erythema, normal landmarks, nasal mucosa edematous , rhinorrhea yellow and oral mucous membranes moist, throat with thick post nasal drainage, maxillary and frontal sinus tender.    NECK: no adenopathy, no asymmetry, masses, or scars and thyroid normal to palpation  RESP: lungs clear to auscultation - no rales, rhonchi or wheezes  CV: regular rate and rhythm, normal S1 S2, no S3 or S4, no murmur, click or rub, no peripheral edema and peripheral pulses strong  MS: no gross musculoskeletal defects noted, no edema  PSYCH: mentation appears normal, affect normal/bright            Assessment & Plan     1. Acute recurrent maxillary sinusitis  Stop doxycycline while taking clarithromycin  Start over the counter zyrtec daily  - clarithromycin (BIAXIN) 500 MG tablet; Take 1 tablet (500 mg) by mouth 2 times daily for 14 days  Dispense: 28 tablet; Refill: 0  - OTOLARYNGOLOGY REFERRAL - evaluation - allergies VS recurrent sinusitis.       BMI:   Estimated body mass index is 28.66 kg/m  as calculated from the following:    Height as of this encounter: 1.753 m (5' 9\").    Weight as of this encounter: 88 kg (194 lb 1.6 oz).           Return if symptoms worsen or fail to improve.    Eunice Dias NP  [facility]      "

## 2019-12-05 NOTE — NURSING NOTE
"Chief Complaint   Patient presents with     Sinus Problem       Initial BP (!) 142/78 (BP Location: Left arm, Patient Position: Chair, Cuff Size: Adult Regular)   Pulse 66   Temp 98.2  F (36.8  C) (Tympanic)   Resp 18   Ht 1.753 m (5' 9\")   Wt 88 kg (194 lb 1.6 oz)   SpO2 98%   BMI 28.66 kg/m   Estimated body mass index is 28.66 kg/m  as calculated from the following:    Height as of this encounter: 1.753 m (5' 9\").    Weight as of this encounter: 88 kg (194 lb 1.6 oz).  Medication Reconciliation: complete  Pamela M. Lechevalier, LPN      "

## 2019-12-11 ENCOUNTER — TELEPHONE (OUTPATIENT)
Dept: FAMILY MEDICINE | Facility: OTHER | Age: 84
End: 2019-12-11

## 2019-12-11 NOTE — TELEPHONE ENCOUNTER
Pt started Biaxin on Monday for a sinus infection  Reports had a severe headache and rapid heart rate, red, blood shot eyes.    Quit take, added to allergy list.    Said he still has a headache.    Please call pt.    Penny Mayers LPN

## 2019-12-12 NOTE — TELEPHONE ENCOUNTER
Called and checked on pt, he feels pretty good now, just wanted you to know of his reaction to Biaxin.    Penny Mayers LPN

## 2020-04-03 ENCOUNTER — NURSE TRIAGE (OUTPATIENT)
Dept: FAMILY MEDICINE | Facility: OTHER | Age: 85
End: 2020-04-03

## 2020-04-03 ENCOUNTER — VIRTUAL VISIT (OUTPATIENT)
Dept: FAMILY MEDICINE | Facility: OTHER | Age: 85
End: 2020-04-03
Attending: NURSE PRACTITIONER
Payer: MEDICARE

## 2020-04-03 DIAGNOSIS — J01.90 ACUTE SINUSITIS WITH SYMPTOMS GREATER THAN 10 DAYS: ICD-10-CM

## 2020-04-03 DIAGNOSIS — F41.1 GENERALIZED ANXIETY DISORDER: Primary | ICD-10-CM

## 2020-04-03 PROCEDURE — G2012 BRIEF CHECK IN BY MD/QHP: HCPCS | Performed by: NURSE PRACTITIONER

## 2020-04-03 RX ORDER — CEPHALEXIN 500 MG/1
500 CAPSULE ORAL 2 TIMES DAILY
Qty: 20 CAPSULE | Refills: 0 | Status: SHIPPED | OUTPATIENT
Start: 2020-04-03 | End: 2020-04-22

## 2020-04-03 RX ORDER — LORATADINE 10 MG/1
10 TABLET ORAL DAILY
Qty: 30 TABLET | Refills: 1 | Status: SHIPPED | OUTPATIENT
Start: 2020-04-03 | End: 2023-07-13

## 2020-04-03 RX ORDER — CITALOPRAM HYDROBROMIDE 10 MG/1
10 TABLET ORAL DAILY
Qty: 30 TABLET | Refills: 1 | Status: SHIPPED | OUTPATIENT
Start: 2020-04-03 | End: 2020-04-22

## 2020-04-03 RX ORDER — LORAZEPAM 0.5 MG/1
0.5 TABLET ORAL EVERY 6 HOURS PRN
Qty: 20 TABLET | Refills: 1 | Status: SHIPPED | OUTPATIENT
Start: 2020-04-03 | End: 2021-10-14

## 2020-04-03 NOTE — TELEPHONE ENCOUNTER
Patient calling and reports he is still having a sinus infection and also has a productive cough since December. States in the morning he is coughing up clear phlegm and then he is fine in the afternoon. Every once in awhile he has a runny nose and a headache and sinus pressure. Patient denies fever, difficulty breathing, chest pain, nausea, vomiting, diarrhea. Patient is requesting an antibiotic. Also states he has been having increased anxiety and would like a medication for this. Patient is willing to do a phone visit if appropriate.     Patient's phone number is [phone] or [phone]    [facility] for pharmacy.

## 2020-04-03 NOTE — PROGRESS NOTES
"Subjective     Enzo Colin is a 85 year old male who is being evaluated via a billable telephone visit.      The patient has been notified of following:     \"This telephone visit will be conducted via a call between you and your physician/provider. We have found that certain health care needs can be provided without the need for a physical exam.  This service lets us provide the care you need with a short phone conversation.  If a prescription is necessary we can send it directly to your pharmacy.  If lab work is needed we can place an order for that and you can then stop by our lab to have the test done at a later time.    If during the course of the call the physician/provider feels a telephone visit is not appropriate, you will not be charged for this service.\"     Patient has given verbal consent for Telephone visit?  Yes    Enzo Colin complains of   Chief Complaint   Patient presents with     RECHECK       ALLERGIES  Amoxicillin; Biaxin [clarithromycin]; Clindamycin; and Sulfa drugs    Follow up sinus infection      Duration: Since December    Description (location/character/radiation): Sinus infection    Intensity:  none    Accompanying signs and symptoms: cough, sinus drainage, headaches-pt state anxiety has increased as well.    History (similar episodes/previous evaluation): previously evaluated    Precipitating or alleviating factors: None    Therapies tried and outcome: recent antibiotic in December - keflex which helped greatly.  He is using flonase as well.        Anxiety - lifelong issue since Jm Nam.  He has used ativan and valium in the past, has not been on antidepressant/anti-anxiety in the past but is willing to try.      Patient Active Problem List   Diagnosis     Advanced care planning/counseling discussion     Benign prostatic hyperplasia with lower urinary tract symptoms, symptom details unspecified     Rosacea     Dyspnea     Heart murmur secondary to mitral, tricuspid, and pulmonary " insufficiency.     History of tobacco abuse     Essential hypertension, benign     Chronic diastolic heart failure (H)     RBBB (right bundle branch block)     CKD (chronic kidney disease) stage 3, GFR 30-59 ml/min (H)     RUMA (generalized anxiety disorder)     Panic attack     Macular degeneration (senile) of retina     Past Surgical History:   Procedure Laterality Date     discectomy  1986     open reduction internal fixation  2012    Rotator cuff tear (right)     surgical  1996    Bladder cancer       Social History     Tobacco Use     Smoking status: Former Smoker     Packs/day: 0.00     Types: Cigarettes     Smokeless tobacco: Former User     Tobacco comment: tried to quit yes, yr quit 1977   Substance Use Topics     Alcohol use: No     Family History   Problem Relation Age of Onset     Diabetes No family hx of      Hypertension No family hx of      Coronary Artery Disease No family hx of          Current Outpatient Medications   Medication Sig Dispense Refill     aspirin 81 MG EC tablet Take 1 tablet (81 mg) by mouth daily 90 tablet 3     Cyanocobalamin (VITAMIN B-12 SL) Vitamin B Complex - Vitamin B12 1,200mcg/mL Sublingual Drops, take one po daily       doxycycline monohydrate 100 MG capsule Take 100 mg by mouth       fluticasone (FLONASE) 50 MCG/ACT nasal spray Spray 1 spray into both nostrils daily 16 g 5     lisinopril (PRINIVIL/ZESTRIL) 30 MG tablet TAKE 1 TABLET DAILY 90 tablet 4     metroNIDAZOLE (METROCREAM) 0.75 % cream Apply by topical route 2 time every day a thin layer to the affected areas in the morning and evening       oxybutynin ER (DITROPAN-XL) 5 MG 24 hr tablet Take 5 mg by mouth       tretinoin (RETIN-A) 0.01 % topical gel        Allergies   Allergen Reactions     Amoxicillin Palpitations     Biaxin [Clarithromycin]      Rapid heart beat, headache     Clindamycin Rash     Sulfa Drugs Rash     Sulfa(Sulfonamide Antibiotics)       Recent Labs   Lab Test 09/05/19  1357 04/22/19  1603   LDL   --  92   HDL  --  37*   TRIG  --  144   CR 1.45* 1.31*   GFRESTIMATED 44* 49*   GFRESTBLACK 51* 57*   POTASSIUM 4.6 4.5   TSH  --  3.53      BP Readings from Last 3 Encounters:   12/05/19 (!) 142/78   10/23/19 132/66   09/05/19 139/62    Wt Readings from Last 3 Encounters:   12/05/19 88 kg (194 lb 1.6 oz)   10/23/19 85.8 kg (189 lb 1.6 oz)   09/05/19 84.8 kg (187 lb)                    Reviewed and updated as needed this visit by Provider         Review of Systems   ROS COMP: Constitutional, HEENT, cardiovascular, pulmonary, gi and gu systems are negative, except as otherwise noted.       Objective   Reported vitals:  There were no vitals taken for this visit.   alert and no distress  Psych: Alert and oriented times 3; coherent speech, normal   rate and volume, able to articulate logical thoughts, able   to abstract reason, no tangential thoughts, no hallucinations   or delusions  His affect is bright             Assessment/Plan:  1. Acute sinusitis with symptoms greater than 10 days  Continue flonase  - cephALEXin (KEFLEX) 500 MG capsule; Take 1 capsule (500 mg) by mouth 2 times daily for 10 days  Dispense: 20 capsule; Refill: 0  - loratadine (CLARITIN) 10 MG tablet; Take 1 tablet (10 mg) by mouth daily  Dispense: 30 tablet; Refill: 1    2. Generalized anxiety disorder  Start   - citalopram (CELEXA) 10 MG tablet; Take 1 tablet (10 mg) by mouth daily  Dispense: 30 tablet; Refill: 1  - LORazepam (ATIVAN) 0.5 MG tablet; Take 1 tablet (0.5 mg) by mouth every 6 hours as needed for anxiety  Dispense: 20 tablet; Refill: 1    Follow-up in April 23, 2020 via telephone visit.     Phone call duration:  15 minutes    Eunice Dias,   Certified Adult Nurse Practitioner  [phone]

## 2020-04-22 ENCOUNTER — VIRTUAL VISIT (OUTPATIENT)
Dept: FAMILY MEDICINE | Facility: OTHER | Age: 85
End: 2020-04-22
Attending: NURSE PRACTITIONER
Payer: MEDICARE

## 2020-04-22 VITALS — SYSTOLIC BLOOD PRESSURE: 139 MMHG | WEIGHT: 187 LBS | BODY MASS INDEX: 27.62 KG/M2 | DIASTOLIC BLOOD PRESSURE: 62 MMHG

## 2020-04-22 DIAGNOSIS — F41.1 GENERALIZED ANXIETY DISORDER: ICD-10-CM

## 2020-04-22 DIAGNOSIS — I10 ESSENTIAL HYPERTENSION, BENIGN: Primary | ICD-10-CM

## 2020-04-22 DIAGNOSIS — F51.01 PRIMARY INSOMNIA: ICD-10-CM

## 2020-04-22 PROCEDURE — 99442 ZZC PHYSICIAN TELEPHONE EVALUATION 11-20 MIN: CPT | Performed by: NURSE PRACTITIONER

## 2020-04-22 NOTE — PROGRESS NOTES
"Enzo Colin is a 85 year old male who is being evaluated via a billable telephone visit.      The patient has been notified of following:     \"This telephone visit will be conducted via a call between you and your physician/provider. We have found that certain health care needs can be provided without the need for a physical exam.  This service lets us provide the care you need with a short phone conversation.  If a prescription is necessary we can send it directly to your pharmacy.  If lab work is needed we can place an order for that and you can then stop by our lab to have the test done at a later time.    Telephone visits are billed at different rates depending on your insurance coverage. During this emergency period, for some insurers they may be billed the same as an in-person visit.  Please reach out to your insurance provider with any questions.    If during the course of the call the physician/provider feels a telephone visit is not appropriate, you will not be charged for this service.\"    Patient has given verbal consent for Telephone visit?  Yes    How would you like to obtain your AVS? Patient declined    Subjective     Enzo Colin is a 85 year old male who presents to clinic today for the following health issues:    HPI  Hypertension Follow-up      Do you check your blood pressure regularly outside of the clinic? No     Are you following a low salt diet? Yes    Are your blood pressures ever more than 140 on the top number (systolic) OR more   than 90 on the bottom number (diastolic), for example 140/90? Yes      How many servings of fruits and vegetables do you eat daily?  0-1    On average, how many sweetened beverages do you drink each day (Examples: soda, juice, sweet tea, etc.  Do NOT count diet or artificially sweetened beverages)?   0    How many days per week do you exercise enough to make your heart beat faster? 3 or less    How many minutes a day do you exercise enough to make your heart beat " faster? 9 or less    How many days per week do you miss taking your medication? 0       Anxiety - started celexa, he noticed fatigue so stopped it.  He does take ativan only as needed.  Does not like medication, would like to only use ativan as needed.  Has used one in the past 2 weeks.  He does note increased insomnia.        Patient Active Problem List   Diagnosis     Advanced care planning/counseling discussion     Benign prostatic hyperplasia with lower urinary tract symptoms, symptom details unspecified     Rosacea     Dyspnea     Heart murmur secondary to mitral, tricuspid, and pulmonary insufficiency.     History of tobacco abuse     Essential hypertension, benign     Chronic diastolic heart failure (H)     RBBB (right bundle branch block)     CKD (chronic kidney disease) stage 3, GFR 30-59 ml/min (H)     RUMA (generalized anxiety disorder)     Panic attack     Macular degeneration (senile) of retina     Past Surgical History:   Procedure Laterality Date     discectomy  1986     open reduction internal fixation  2012    Rotator cuff tear (right)     surgical  1996    Bladder cancer       Social History     Tobacco Use     Smoking status: Former Smoker     Packs/day: 0.00     Types: Cigarettes     Smokeless tobacco: Former User     Tobacco comment: tried to quit yes, yr quit 1977   Substance Use Topics     Alcohol use: No     Family History   Problem Relation Age of Onset     Diabetes No family hx of      Hypertension No family hx of      Coronary Artery Disease No family hx of          Current Outpatient Medications   Medication Sig Dispense Refill     aspirin 81 MG EC tablet Take 1 tablet (81 mg) by mouth daily 90 tablet 3     Cyanocobalamin (VITAMIN B-12 SL) Vitamin B Complex - Vitamin B12 1,200mcg/mL Sublingual Drops, take one po daily       doxycycline monohydrate 100 MG capsule Take 100 mg by mouth       fluticasone (FLONASE) 50 MCG/ACT nasal spray Spray 1 spray into both nostrils daily 16 g 5      lisinopril (PRINIVIL/ZESTRIL) 30 MG tablet TAKE 1 TABLET DAILY 90 tablet 4     loratadine (CLARITIN) 10 MG tablet Take 1 tablet (10 mg) by mouth daily 30 tablet 1     LORazepam (ATIVAN) 0.5 MG tablet Take 1 tablet (0.5 mg) by mouth every 6 hours as needed for anxiety 20 tablet 1     metroNIDAZOLE (METROCREAM) 0.75 % cream Apply by topical route 2 time every day a thin layer to the affected areas in the morning and evening       oxybutynin ER (DITROPAN-XL) 5 MG 24 hr tablet Take 5 mg by mouth       tretinoin (RETIN-A) 0.01 % topical gel        Allergies   Allergen Reactions     Amoxicillin Palpitations     Biaxin [Clarithromycin]      Rapid heart beat, headache     Clindamycin Rash     Sulfa Drugs Rash     Sulfa(Sulfonamide Antibiotics)       Recent Labs   Lab Test 09/05/19  1357 04/22/19  1603   LDL  --  92   HDL  --  37*   TRIG  --  144   CR 1.45* 1.31*   GFRESTIMATED 44* 49*   GFRESTBLACK 51* 57*   POTASSIUM 4.6 4.5   TSH  --  3.53      BP Readings from Last 3 Encounters:   04/22/20 139/62   12/05/19 (!) 142/78   10/23/19 132/66    Wt Readings from Last 3 Encounters:   04/22/20 84.8 kg (187 lb)   12/05/19 88 kg (194 lb 1.6 oz)   10/23/19 85.8 kg (189 lb 1.6 oz)                    Reviewed and updated as needed this visit by Provider         Review of Systems   ROS COMP: Constitutional, HEENT, cardiovascular, pulmonary, gi and gu systems are negative, except as otherwise noted.       Objective   Reported vitals:  /62 (BP Location: Right arm)   Wt 84.8 kg (187 lb)   BMI 27.62 kg/m     alert and no distress  PSYCH: Alert and oriented times 3; coherent speech, normal   rate and volume, able to articulate logical thoughts, able   to abstract reason, no tangential thoughts, no hallucinations   or delusions  His affect is normal and pleasant  RESP: No cough, no audible wheezing, able to talk in full sentences  Remainder of exam unable to be completed due to telephone visits            Assessment/Plan:  1.  Essential hypertension, benign  Continue lisionpril    2. Generalized anxiety disorder  Stop celexa for now  Continue ativan as needed    3. Primary insomnia  Consider trying melatonin or benadryl per package directions.          3 month follow-up will be due for labs at that time.    Phone call duration:  12 minutes    Eunice Dias,   Certified Adult Nurse Practitioner  [phone]

## 2020-04-22 NOTE — PATIENT INSTRUCTIONS
Assessment/Plan:  1. Essential hypertension, benign  Continue lisionpril    2. Generalized anxiety disorder  Stop celexa for now  Continue ativan as needed    3. Primary insomnia  Consider trying melatonin or benadryl per package directions.          3 month follow-up will be due for labs at that time.    Phone call duration:  12 minutes    Eunice Dias,   Certified Adult Nurse Practitioner  [phone]

## 2020-04-22 NOTE — NURSING NOTE
"No chief complaint on file.      Initial /62 (BP Location: Right arm)   Wt 84.8 kg (187 lb)   BMI 27.62 kg/m   Estimated body mass index is 27.62 kg/m  as calculated from the following:    Height as of 12/5/19: 1.753 m (5' 9\").    Weight as of this encounter: 84.8 kg (187 lb).  Medication Reconciliation: complete  Yesi Salcedo LPN    "

## 2021-02-10 NOTE — PROGRESS NOTES
"    Assessment & Plan     1. 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      2. Chronic diastolic heart failure (H)  - Comprehensive metabolic panel  - TSH with free T4 reflex    3. Stage 3a chronic kidney disease  Do  Not use NSAIDS    4. Acute sinusitis with symptoms greater than 10 days  - azithromycin (ZITHROMAX) 250 MG tablet; Take 2 tablets (500 mg) by mouth daily for 1 day, THEN 1 tablet (250 mg) daily for 9 days.  Dispense: 11 tablet; Refill: 0    5. Chronic rhinitis  - triamcinolone (NASACORT) 55 MCG/ACT nasal aerosol; Spray 2 sprays into both nostrils daily  Dispense: 10.8 g; Refill: 3  - loratadine (CLARITIN) 10 MG tablet; Take 1 tablet (10 mg) by mouth daily  Dispense: 90 tablet; Refill: 1         BMI:   Estimated body mass index is 28.04 kg/m  as calculated from the following:    Height as of this encounter: 1.753 m (5' 9\").    Weight as of this encounter: 86.1 kg (189 lb 14.4 oz).           Return in about 3 months (around 5/15/2021) for hypertension, heart failure.    Eunice Dias NP  [facility] - OSBALDO Giraldo is a 86 year old who presents for the following health issues     HPI       Hyperlipidemia Follow-Up      Are you regularly taking any medication or supplement to lower your cholesterol?   No    Are you having muscle aches or other side effects that you think could be caused by your cholesterol lowering medication?  No   The ASCVD Risk score (Kokomo JOE Jr., et al., 2013) failed to calculate for the following reasons:    The 2013 ASCVD risk score is only valid for ages 40 to 79        Heart Failure Follow-up    Are you experiencing any shortness of breath? No    Are you experiencing any swelling in your legs or feet?  No    Are you using more pillows than usual? Yes    Do you cough at night?  No    Do you check your weight daily?  Yes    Have you " had a weight change recently?  Weight increase    Are you having any of the following side effects from your medications? (Select all that apply)  The patient does not report symptoms of dizziness, fatigue, cough, swelling, or slow heart beat.    Since your last visit, how many times have you gone to the cardiologist, urgent care, emergency room, or hospital because of your heart failure?   None    Chronic Kidney Disease Follow-up    Do you take any over the counter pain medicine?: Yes  What over the counter medicine are you taking for your pain?:  Tylenol       How often do you take this medicine?:  A few times a month      How many servings of fruits and vegetables do you eat daily?  0-1    On average, how many sweetened beverages do you drink each day (Examples: soda, juice, sweet tea, etc.  Do NOT count diet or artificially sweetened beverages)?   0    How many days per week do you exercise enough to make your heart beat faster? 3 or less    How many minutes a day do you exercise enough to make your heart beat faster? 9 or less    How many days per week do you miss taking your medication? 0        Acute Illness  Acute illness concerns: Sinus issues   Onset/Duration: 2 months   Symptoms:  Fever: no  Chills/Sweats: no  Headache (location?): YES - frontal sinus  Sinus Pressure: no  Conjunctivitis:  no  Ear Pain: no  Rhinorrhea: YES  Congestion: YES  Sore Throat: no  Cough: no  Wheeze: no  Decreased Appetite: no  Nausea: no  Vomiting: no  Diarrhea: no  Dysuria/Freq.: no  Dysuria or Hematuria: no  Fatigue/Achiness: no  Sick/Strep Exposure: no  Therapies tried and outcome: Flonase Claritin      Patient Active Problem List   Diagnosis     Advanced care planning/counseling discussion     Benign prostatic hyperplasia with lower urinary tract symptoms, symptom details unspecified     Rosacea     Dyspnea     Heart murmur secondary to mitral, tricuspid, and pulmonary insufficiency.     History of tobacco abuse      Hypertensive heart disease with chronic diastolic congestive heart failure (H)     Chronic diastolic heart failure (H)     RBBB (right bundle branch block)     CKD (chronic kidney disease) stage 3, GFR 30-59 ml/min     RUMA (generalized anxiety disorder)     Panic attack     Macular degeneration (senile) of retina     Past Surgical History:   Procedure Laterality Date     discectomy  1986     open reduction internal fixation  2012    Rotator cuff tear (right)     surgical  1996    Bladder cancer       Social History     Tobacco Use     Smoking status: Former Smoker     Packs/day: 0.00     Types: Cigarettes     Smokeless tobacco: Former User     Tobacco comment: tried to quit yes, yr quit 1977   Substance Use Topics     Alcohol use: No     Family History   Problem Relation Age of Onset     Diabetes No family hx of      Hypertension No family hx of      Coronary Artery Disease No family hx of          Current Outpatient Medications   Medication Sig Dispense Refill     aspirin 81 MG EC tablet Take 1 tablet (81 mg) by mouth daily 90 tablet 3     azithromycin (ZITHROMAX) 250 MG tablet Take 2 tablets (500 mg) by mouth daily for 1 day, THEN 1 tablet (250 mg) daily for 9 days. 11 tablet 0     Cyanocobalamin (VITAMIN B-12 SL) Vitamin B Complex - Vitamin B12 1,200mcg/mL Sublingual Drops, take one po daily       doxycycline monohydrate 100 MG capsule Take 100 mg by mouth       lisinopril (PRINIVIL/ZESTRIL) 30 MG tablet TAKE 1 TABLET DAILY 90 tablet 4     loratadine (CLARITIN) 10 MG tablet Take 1 tablet (10 mg) by mouth daily 90 tablet 1     loratadine (CLARITIN) 10 MG tablet Take 1 tablet (10 mg) by mouth daily 30 tablet 1     metroNIDAZOLE (METROCREAM) 0.75 % cream Apply by topical route 2 time every day a thin layer to the affected areas in the morning and evening       oxybutynin ER (DITROPAN-XL) 5 MG 24 hr tablet Take 5 mg by mouth       tretinoin (RETIN-A) 0.01 % topical gel        triamcinolone (NASACORT) 55 MCG/ACT nasal  "aerosol Spray 2 sprays into both nostrils daily 10.8 g 3     LORazepam (ATIVAN) 0.5 MG tablet Take 1 tablet (0.5 mg) by mouth every 6 hours as needed for anxiety (Patient not taking: Reported on 2/15/2021) 20 tablet 1     Allergies   Allergen Reactions     Amoxicillin Palpitations     Biaxin [Clarithromycin]      Rapid heart beat, headache     Clindamycin Rash     Sulfa Drugs Rash     Sulfa(Sulfonamide Antibiotics)       Recent Labs   Lab Test 09/05/19  1357 04/22/19  1603   LDL  --  92   HDL  --  37*   TRIG  --  144   CR 1.45* 1.31*   GFRESTIMATED 44* 49*   GFRESTBLACK 51* 57*   POTASSIUM 4.6 4.5   TSH  --  3.53      BP Readings from Last 3 Encounters:   02/15/21 (!) 144/80   04/22/20 139/62   12/05/19 (!) 142/78    Wt Readings from Last 3 Encounters:   02/15/21 86.1 kg (189 lb 14.4 oz)   04/22/20 84.8 kg (187 lb)   12/05/19 88 kg (194 lb 1.6 oz)                     Review of Systems   Constitutional, HEENT, cardiovascular, pulmonary, gi and gu systems are negative, except as otherwise noted.      Objective    BP (!) 144/80 (BP Location: Left arm, Patient Position: Chair, Cuff Size: Adult Large)   Pulse 91   Temp 96.9  F (36.1  C) (Tympanic)   Resp 16   Ht 1.753 m (5' 9\")   Wt 86.1 kg (189 lb 14.4 oz)   SpO2 98%   BMI 28.04 kg/m    Body mass index is 28.04 kg/m .  Physical Exam   GENERAL: healthy, alert and no distress  HENT: normal cephalic/atraumatic, both ears: erythematous, nose and mouth without ulcers or lesions, nasal mucosa edematous , rhinorrhea clear, yellow and thick and oral mucous membranes moist, throat with thick post nasal drainage.  Frontal sinus tender   NECK: no adenopathy, no asymmetry, masses, or scars and thyroid normal to palpation  RESP: lungs clear to auscultation - no rales, rhonchi or wheezes  CV: regular rate and rhythm, normal S1 S2, no S3 or S4, no murmur, click or rub, no peripheral edema and peripheral pulses strong  MS: no gross musculoskeletal defects noted, no edema  PSYCH: " mentation appears normal, affect normal/bright

## 2021-02-11 ENCOUNTER — OFFICE VISIT (OUTPATIENT)
Dept: FAMILY MEDICINE | Facility: OTHER | Age: 86
End: 2021-02-11
Attending: NURSE PRACTITIONER
Payer: MEDICARE

## 2021-02-11 DIAGNOSIS — Z20.822 COVID-19 RULED OUT: ICD-10-CM

## 2021-02-11 DIAGNOSIS — Z20.822 COVID-19 RULED OUT: Primary | ICD-10-CM

## 2021-02-11 PROCEDURE — U0005 INFEC AGEN DETEC AMPLI PROBE: HCPCS | Mod: ZL | Performed by: NURSE PRACTITIONER

## 2021-02-11 PROCEDURE — U0003 INFECTIOUS AGENT DETECTION BY NUCLEIC ACID (DNA OR RNA); SEVERE ACUTE RESPIRATORY SYNDROME CORONAVIRUS 2 (SARS-COV-2) (CORONAVIRUS DISEASE [COVID-19]), AMPLIFIED PROBE TECHNIQUE, MAKING USE OF HIGH THROUGHPUT TECHNOLOGIES AS DESCRIBED BY CMS-2020-01-R: HCPCS | Mod: ZL | Performed by: NURSE PRACTITIONER

## 2021-02-12 LAB
SARS-COV-2 RNA RESP QL NAA+PROBE: NOT DETECTED
SPECIMEN SOURCE: NORMAL

## 2021-02-15 ENCOUNTER — TELEPHONE (OUTPATIENT)
Dept: FAMILY MEDICINE | Facility: OTHER | Age: 86
End: 2021-02-15

## 2021-02-15 ENCOUNTER — OFFICE VISIT (OUTPATIENT)
Dept: FAMILY MEDICINE | Facility: OTHER | Age: 86
End: 2021-02-15
Attending: NURSE PRACTITIONER
Payer: MEDICARE

## 2021-02-15 VITALS
WEIGHT: 189.9 LBS | SYSTOLIC BLOOD PRESSURE: 144 MMHG | BODY MASS INDEX: 28.13 KG/M2 | HEART RATE: 91 BPM | HEIGHT: 69 IN | OXYGEN SATURATION: 98 % | DIASTOLIC BLOOD PRESSURE: 80 MMHG | TEMPERATURE: 96.9 F | RESPIRATION RATE: 16 BRPM

## 2021-02-15 DIAGNOSIS — I11.0 HYPERTENSIVE HEART DISEASE WITH CHRONIC DIASTOLIC CONGESTIVE HEART FAILURE (H): Primary | ICD-10-CM

## 2021-02-15 DIAGNOSIS — I50.32 CHRONIC DIASTOLIC HEART FAILURE (H): ICD-10-CM

## 2021-02-15 DIAGNOSIS — N18.31 STAGE 3A CHRONIC KIDNEY DISEASE (H): ICD-10-CM

## 2021-02-15 DIAGNOSIS — J01.90 ACUTE SINUSITIS WITH SYMPTOMS GREATER THAN 10 DAYS: ICD-10-CM

## 2021-02-15 DIAGNOSIS — I50.32 HYPERTENSIVE HEART DISEASE WITH CHRONIC DIASTOLIC CONGESTIVE HEART FAILURE (H): Primary | ICD-10-CM

## 2021-02-15 DIAGNOSIS — J01.90 ACUTE SINUSITIS WITH SYMPTOMS GREATER THAN 10 DAYS: Primary | ICD-10-CM

## 2021-02-15 DIAGNOSIS — J31.0 CHRONIC RHINITIS: ICD-10-CM

## 2021-02-15 LAB
ALBUMIN SERPL-MCNC: 3.9 G/DL (ref 3.4–5)
ALP SERPL-CCNC: 78 U/L (ref 40–150)
ALT SERPL W P-5'-P-CCNC: 14 U/L (ref 0–70)
ANION GAP SERPL CALCULATED.3IONS-SCNC: 3 MMOL/L (ref 3–14)
AST SERPL W P-5'-P-CCNC: 21 U/L (ref 0–45)
BILIRUB SERPL-MCNC: 0.5 MG/DL (ref 0.2–1.3)
BUN SERPL-MCNC: 24 MG/DL (ref 7–30)
CALCIUM SERPL-MCNC: 8.8 MG/DL (ref 8.5–10.1)
CHLORIDE SERPL-SCNC: 107 MMOL/L (ref 94–109)
CHOLEST SERPL-MCNC: 171 MG/DL
CO2 SERPL-SCNC: 28 MMOL/L (ref 20–32)
CREAT SERPL-MCNC: 1.35 MG/DL (ref 0.66–1.25)
GFR SERPL CREATININE-BSD FRML MDRD: 47 ML/MIN/{1.73_M2}
GLUCOSE SERPL-MCNC: 88 MG/DL (ref 70–99)
HDLC SERPL-MCNC: 44 MG/DL
LDLC SERPL CALC-MCNC: 101 MG/DL
NONHDLC SERPL-MCNC: 127 MG/DL
POTASSIUM SERPL-SCNC: 4.3 MMOL/L (ref 3.4–5.3)
PROT SERPL-MCNC: 7.2 G/DL (ref 6.8–8.8)
SODIUM SERPL-SCNC: 138 MMOL/L (ref 133–144)
T4 FREE SERPL-MCNC: 1.12 NG/DL (ref 0.76–1.46)
TRIGL SERPL-MCNC: 128 MG/DL
TSH SERPL DL<=0.005 MIU/L-ACNC: 4.04 MU/L (ref 0.4–4)

## 2021-02-15 PROCEDURE — 84443 ASSAY THYROID STIM HORMONE: CPT | Mod: ZL | Performed by: NURSE PRACTITIONER

## 2021-02-15 PROCEDURE — 80061 LIPID PANEL: CPT | Mod: ZL | Performed by: NURSE PRACTITIONER

## 2021-02-15 PROCEDURE — 36415 COLL VENOUS BLD VENIPUNCTURE: CPT | Mod: ZL | Performed by: NURSE PRACTITIONER

## 2021-02-15 PROCEDURE — G0463 HOSPITAL OUTPT CLINIC VISIT: HCPCS

## 2021-02-15 PROCEDURE — 84439 ASSAY OF FREE THYROXINE: CPT | Mod: ZL | Performed by: NURSE PRACTITIONER

## 2021-02-15 PROCEDURE — 99214 OFFICE O/P EST MOD 30 MIN: CPT | Performed by: NURSE PRACTITIONER

## 2021-02-15 PROCEDURE — 80053 COMPREHEN METABOLIC PANEL: CPT | Mod: ZL | Performed by: NURSE PRACTITIONER

## 2021-02-15 RX ORDER — TRIAMCINOLONE ACETONIDE 55 UG/1
2 SPRAY, METERED NASAL DAILY
Qty: 10.8 G | Refills: 3 | Status: SHIPPED | OUTPATIENT
Start: 2021-02-15 | End: 2022-07-14

## 2021-02-15 RX ORDER — AZITHROMYCIN 250 MG/1
TABLET, FILM COATED ORAL
Qty: 11 TABLET | Refills: 0 | Status: SHIPPED | OUTPATIENT
Start: 2021-02-15 | End: 2021-02-15 | Stop reason: ALTCHOICE

## 2021-02-15 RX ORDER — CEPHALEXIN 500 MG/1
500 CAPSULE ORAL 2 TIMES DAILY
Qty: 20 CAPSULE | Refills: 0 | Status: SHIPPED | OUTPATIENT
Start: 2021-02-15 | End: 2021-02-25

## 2021-02-15 RX ORDER — LORATADINE 10 MG/1
10 TABLET ORAL DAILY
Qty: 90 TABLET | Refills: 1 | Status: SHIPPED | OUTPATIENT
Start: 2021-02-15 | End: 2021-06-02

## 2021-02-15 ASSESSMENT — MIFFLIN-ST. JEOR: SCORE: 1531.76

## 2021-02-15 ASSESSMENT — PAIN SCALES - GENERAL: PAINLEVEL: MILD PAIN (2)

## 2021-02-15 NOTE — NURSING NOTE
"Chief Complaint   Patient presents with     Sinus Problem     Lipids     Chronic Disease Management       Initial BP (!) 172/96 (BP Location: Left arm, Patient Position: Chair, Cuff Size: Adult Large)   Pulse 91   Temp 96.9  F (36.1  C) (Tympanic)   Resp 16   Ht 1.753 m (5' 9\")   Wt 86.1 kg (189 lb 14.4 oz)   SpO2 98%   BMI 28.04 kg/m   Estimated body mass index is 28.04 kg/m  as calculated from the following:    Height as of this encounter: 1.753 m (5' 9\").    Weight as of this encounter: 86.1 kg (189 lb 14.4 oz).  Medication Reconciliation: complete  Pamela M. Lechevalier, LPN    "

## 2021-02-15 NOTE — TELEPHONE ENCOUNTER
Talked to patient and medication prescribed today has allergy to needs new mediation sent to [facility]

## 2021-02-15 NOTE — PATIENT INSTRUCTIONS
"    Assessment & Plan     1. Hypertensive heart disease with chronic diastolic congestive heart failure (H)  - Lipid Profile    2. Chronic diastolic heart failure (H)  - Comprehensive metabolic panel  - TSH with free T4 reflex    3. Stage 3a chronic kidney disease  Do  Not use NSAIDS    4. Acute sinusitis with symptoms greater than 10 days  - azithromycin (ZITHROMAX) 250 MG tablet; Take 2 tablets (500 mg) by mouth daily for 1 day, THEN 1 tablet (250 mg) daily for 9 days.  Dispense: 11 tablet; Refill: 0    5. Chronic rhinitis  - triamcinolone (NASACORT) 55 MCG/ACT nasal aerosol; Spray 2 sprays into both nostrils daily  Dispense: 10.8 g; Refill: 3  - loratadine (CLARITIN) 10 MG tablet; Take 1 tablet (10 mg) by mouth daily  Dispense: 90 tablet; Refill: 1         BMI:   Estimated body mass index is 28.04 kg/m  as calculated from the following:    Height as of this encounter: 1.753 m (5' 9\").    Weight as of this encounter: 86.1 kg (189 lb 14.4 oz).           Return in about 3 months (around 5/15/2021) for hypertension, heart failure.    Eunice Dias NP  [facility]    "

## 2021-03-08 ENCOUNTER — OFFICE VISIT (OUTPATIENT)
Dept: FAMILY MEDICINE | Facility: OTHER | Age: 86
End: 2021-03-08
Attending: NURSE PRACTITIONER
Payer: MEDICARE

## 2021-03-08 VITALS
HEART RATE: 94 BPM | OXYGEN SATURATION: 98 % | RESPIRATION RATE: 18 BRPM | TEMPERATURE: 97.6 F | SYSTOLIC BLOOD PRESSURE: 170 MMHG | DIASTOLIC BLOOD PRESSURE: 82 MMHG

## 2021-03-08 DIAGNOSIS — I10 ESSENTIAL HYPERTENSION, BENIGN: ICD-10-CM

## 2021-03-08 PROCEDURE — 99207 PR NO CHARGE NURSE ONLY: CPT

## 2021-03-08 RX ORDER — LISINOPRIL 40 MG/1
40 TABLET ORAL DAILY
Qty: 90 TABLET | Refills: 1 | Status: SHIPPED | OUTPATIENT
Start: 2021-03-08 | End: 2021-03-08

## 2021-03-08 RX ORDER — LISINOPRIL 40 MG/1
40 TABLET ORAL DAILY
Qty: 90 TABLET | Refills: 0 | Status: SHIPPED | OUTPATIENT
Start: 2021-03-08 | End: 2021-05-21

## 2021-03-08 NOTE — PROGRESS NOTES
Pt presented today for a bp check. Bp initially was 168/80, repeat bp after 10 minutes 170/82. PCP notified, lisinopril increased from 30 mg to 40 mg. Pt notified of medication change and instructed to return to clinic in 1-2 weeks for a repeat blood pressure check.      BP Readings from Last 3 Encounters:   03/08/21 (!) 168/80   02/15/21 (!) 144/80   04/22/20 139/62       Giselle Wolfe LPN

## 2021-04-21 PROBLEM — Z85.828 HISTORY OF NONMELANOMA SKIN CANCER: Status: ACTIVE | Noted: 2018-04-03

## 2021-04-21 NOTE — PROGRESS NOTES
"    Assessment & Plan     1. Hypertensive heart disease with chronic diastolic congestive heart failure (H)  Continue atenolol and lisinopril will add norvasc  - CBC with platelets and differential  - amLODIPine (NORVASC) 5 MG tablet; Take 1 tablet (5 mg) by mouth daily  Dispense: 30 tablet; Refill: 1  - Lipid Profile  - Comprehensive metabolic panel  - TSH with free T4 reflex    2. Stage 3a chronic kidney disease  Do not use NSAIDS    3. Chronic pain of right knee  Arthritis noted   - XR Knee Right 3 Views    4. Primary osteoarthritis of right knee  May consider physical therapy  - ORTHOPEDIC ADULT REFERRAL; Future      Review of the result(s) of each unique test - Knee XR         BMI:   Estimated body mass index is 27.32 kg/m  as calculated from the following:    Height as of this encounter: 1.753 m (5' 9\").    Weight as of this encounter: 83.9 kg (185 lb).           Return in about 2 weeks (around 5/31/2021) for hypertension.    Eunice Dias NP  [facility] - [place] PHUONG Giraldo is a 86 year old who presents for the following health issues     HPI     Hypertension Follow-up    Do you check your blood pressure regularly outside of the clinic? Yes - all  Have been high.      He is now taking lisinopril and atenolol.      Are you following a low salt diet? Yes    Are your blood pressures ever more than 140 on the top number (systolic) OR more   than 90 on the bottom number (diastolic), for example 140/90? No    Heart Failure Follow-up    Are you experiencing any shortness of breath? No    Are you experiencing any swelling in your legs or feet?  No    Are you using more pillows than usual? No    Do you cough at night?  No    Do you check your weight daily?  No    Have you had a weight change recently?  No    Are you having any of the following side effects from your medications? (Select all that apply)  The patient does not report symptoms of dizziness, fatigue, cough, swelling, or " slow heart beat.    Since your last visit, how many times have you gone to the cardiologist, urgent care, emergency room, or hospital because of your heart failure?   None    Anxiety Follow-Up    How are you doing with your anxiety since your last visit? No change    Are you having other symptoms that might be associated with anxiety? No    Have you had a significant life event? No     Are you feeling depressed? No    Do you have any concerns with your use of alcohol or other drugs? No    Social History     Tobacco Use     Smoking status: Former Smoker     Packs/day: 0.00     Types: Cigarettes     Smokeless tobacco: Former User     Tobacco comment: tried to quit yes, yr quit 1977   Substance Use Topics     Alcohol use: No     Drug use: No     RUMA-7 SCORE 1/29/2018 4/11/2018 6/7/2018   Total Score 0 0 0     PHQ 1/29/2018 4/11/2018 6/7/2018   PHQ-9 Total Score 0 0 2   Q9: Thoughts of better off dead/self-harm past 2 weeks Not at all Not at all Not at all         Chronic Kidney Disease Follow-up  Do you take any over the counter pain medicine?: Yes  What over the counter medicine are you taking for your pain?:  tylenol      How often do you take this medicine?:  A few times a month      How many servings of fruits and vegetables do you eat daily?  0-1    On average, how many sweetened beverages do you drink each day (Examples: soda, juice, sweet tea, etc.  Do NOT count diet or artificially sweetened beverages)?   0    How many days per week do you exercise enough to make your heart beat faster? 6    How many minutes a day do you exercise enough to make your heart beat faster? 20 - 29    How many days per week do you miss taking your medication? 0    His other concern is chronic, worsening right knee pain.   Pain is worse with using stairs and walking.  He has not taken anything for pain.  He has not used ice or heat.      Patient Active Problem List   Diagnosis     Advanced care planning/counseling discussion      Benign prostatic hyperplasia with lower urinary tract symptoms, symptom details unspecified     Rosacea     Dyspnea     Heart murmur secondary to mitral, tricuspid, and pulmonary insufficiency.     History of tobacco abuse     Hypertensive heart disease with chronic diastolic congestive heart failure (H)     Chronic diastolic heart failure (H)     RBBB (right bundle branch block)     CKD (chronic kidney disease) stage 3, GFR 30-59 ml/min     RUMA (generalized anxiety disorder)     Panic attack     Macular degeneration (senile) of retina     History of nonmelanoma skin cancer     Past Surgical History:   Procedure Laterality Date     discectomy  1986     open reduction internal fixation  2012    Rotator cuff tear (right)     surgical  1996    Bladder cancer       Social History     Tobacco Use     Smoking status: Former Smoker     Packs/day: 0.00     Types: Cigarettes     Smokeless tobacco: Former User     Tobacco comment: tried to quit yes, yr quit 1977   Substance Use Topics     Alcohol use: No     Family History   Problem Relation Age of Onset     Diabetes No family hx of      Hypertension No family hx of      Coronary Artery Disease No family hx of          Current Outpatient Medications   Medication Sig Dispense Refill     amLODIPine (NORVASC) 5 MG tablet Take 1 tablet (5 mg) by mouth daily 30 tablet 1     aspirin 81 MG EC tablet Take 1 tablet (81 mg) by mouth daily 90 tablet 3     atenolol (TENORMIN) 50 MG tablet Take 1 tablet (50 mg) by mouth daily 30 tablet 1     Cyanocobalamin (VITAMIN B-12 SL) Vitamin B Complex - Vitamin B12 1,200mcg/mL Sublingual Drops, take one po daily       doxycycline monohydrate 100 MG capsule Take 100 mg by mouth       lisinopril (ZESTRIL) 40 MG tablet Take 1 tablet (40 mg) by mouth daily 90 tablet 0     loratadine (CLARITIN) 10 MG tablet Take 1 tablet (10 mg) by mouth daily 30 tablet 1     LORazepam (ATIVAN) 0.5 MG tablet Take 1 tablet (0.5 mg) by mouth every 6 hours as needed for  "anxiety 20 tablet 1     metroNIDAZOLE (METROCREAM) 0.75 % cream Apply by topical route 2 time every day a thin layer to the affected areas in the morning and evening       oxybutynin ER (DITROPAN-XL) 5 MG 24 hr tablet Take 5 mg by mouth       tretinoin (RETIN-A) 0.01 % topical gel        loratadine (CLARITIN) 10 MG tablet Take 1 tablet (10 mg) by mouth daily (Patient not taking: Reported on 5/17/2021) 90 tablet 1     triamcinolone (NASACORT) 55 MCG/ACT nasal aerosol Spray 2 sprays into both nostrils daily (Patient not taking: Reported on 5/17/2021) 10.8 g 3     Allergies   Allergen Reactions     Amoxicillin Palpitations     Biaxin [Clarithromycin]      Rapid heart beat, headache     Clindamycin Rash     Sulfa Drugs Rash     Sulfa(Sulfonamide Antibiotics)       Recent Labs   Lab Test 02/15/21  1022 09/05/19  1357 04/22/19  1603   *  --  92   HDL 44  --  37*   TRIG 128  --  144   ALT 14  --   --    CR 1.35* 1.45* 1.31*   GFRESTIMATED 47* 44* 49*   GFRESTBLACK 55* 51* 57*   POTASSIUM 4.3 4.6 4.5   TSH 4.04*  --  3.53      BP Readings from Last 3 Encounters:   05/17/21 (!) 160/90   05/07/21 (!) 166/90   04/23/21 (!) 166/89    Wt Readings from Last 3 Encounters:   05/17/21 83.9 kg (185 lb)   02/15/21 86.1 kg (189 lb 14.4 oz)   04/22/20 84.8 kg (187 lb)                      Review of Systems   Constitutional, HEENT, cardiovascular, pulmonary, gi and gu systems are negative, except as otherwise noted.      Objective    BP (!) 160/90 (BP Location: Right arm, Patient Position: Chair, Cuff Size: Adult Regular)   Pulse 53   Temp 98.1  F (36.7  C) (Tympanic)   Ht 1.753 m (5' 9\")   Wt 83.9 kg (185 lb)   SpO2 98%   BMI 27.32 kg/m    Body mass index is 27.32 kg/m .  Physical Exam   GENERAL: healthy, alert and no distress  NECK: no adenopathy, no asymmetry, masses, or scars, thyroid normal to palpation and no carotid bruits  RESP: lungs clear to auscultation - no rales, rhonchi or wheezes  CV: regular rate and rhythm, " normal S1 S2, no S3 or S4, no murmur, click or rub, no peripheral edema and peripheral pulses strong  MS: no gross musculoskeletal defects noted, no edema  PSYCH: mentation appears normal, affect normal/bright  Results for orders placed or performed in visit on 05/17/21   XR Knee Right 3 Views     Status: None    Narrative    PROCEDURE:  XR KNEE RIGHT 3 VIEWS    HISTORY: Chronic pain of right knee; Chronic pain of right knee    COMPARISON:  None.    TECHNIQUE:  3 views of the right knee were obtained.    FINDINGS:  There is chondrocalcinosis of the right knee. Mild  degenerative osteophytes are seen at the patellofemoral articulation.  There is mild joint space narrowing at the lateral femoral tibial  articulation. Distal femur proximal tibia and fibula appear normal.      Impression    IMPRESSION: Chondrocalcinosis of the right knee. Joint space narrowing  at the lateral femoral tibial articulation      MANOLO HAIR MD   CBC with platelets and differential     Status: None   Result Value Ref Range    WBC 6.9 4.0 - 11.0 10e9/L    RBC Count 4.56 4.4 - 5.9 10e12/L    Hemoglobin 13.6 13.3 - 17.7 g/dL    Hematocrit 42.0 40.0 - 53.0 %    MCV 92 78 - 100 fl    MCH 29.8 26.5 - 33.0 pg    MCHC 32.4 31.5 - 36.5 g/dL    RDW 15.0 10.0 - 15.0 %    Platelet Count 189 150 - 450 10e9/L    % Neutrophils 69.2 %    % Lymphocytes 18.2 %    % Monocytes 10.6 %    % Eosinophils 1.7 %    % Basophils 0.3 %    Absolute Neutrophil 4.8 1.6 - 8.3 10e9/L    Absolute Lymphocytes 1.3 0.8 - 5.3 10e9/L    Absolute Monocytes 0.7 0.0 - 1.3 10e9/L    Absolute Eosinophils 0.1 0.0 - 0.7 10e9/L    Absolute Basophils 0.0 0.0 - 0.2 10e9/L    Diff Method Automated Method    Lipid Profile     Status: Abnormal   Result Value Ref Range    Cholesterol 145 <200 mg/dL    Triglycerides 94 <150 mg/dL    HDL Cholesterol 37 (L) >39 mg/dL    LDL Cholesterol Calculated 89 <100 mg/dL    Non HDL Cholesterol 108 <130 mg/dL   Comprehensive metabolic panel     Status:  Abnormal   Result Value Ref Range    Sodium 139 133 - 144 mmol/L    Potassium 4.3 3.4 - 5.3 mmol/L    Chloride 107 94 - 109 mmol/L    Carbon Dioxide 26 20 - 32 mmol/L    Anion Gap 6 3 - 14 mmol/L    Glucose 95 70 - 99 mg/dL    Urea Nitrogen 25 7 - 30 mg/dL    Creatinine 1.35 (H) 0.66 - 1.25 mg/dL    GFR Estimate 47 (L) >60 mL/min/[1.73_m2]    GFR Estimate If Black 54 (L) >60 mL/min/[1.73_m2]    Calcium 8.3 (L) 8.5 - 10.1 mg/dL    Bilirubin Total 0.5 0.2 - 1.3 mg/dL    Albumin 3.6 3.4 - 5.0 g/dL    Protein Total 6.9 6.8 - 8.8 g/dL    Alkaline Phosphatase 77 40 - 150 U/L    ALT 17 0 - 70 U/L    AST 18 0 - 45 U/L   TSH with free T4 reflex     Status: Abnormal   Result Value Ref Range    TSH 4.14 (H) 0.40 - 4.00 mU/L   T4 free     Status: None   Result Value Ref Range    T4 Free 1.04 0.76 - 1.46 ng/dL

## 2021-04-23 ENCOUNTER — OFFICE VISIT (OUTPATIENT)
Dept: FAMILY MEDICINE | Facility: OTHER | Age: 86
End: 2021-04-23
Attending: NURSE PRACTITIONER
Payer: MEDICARE

## 2021-04-23 VITALS
SYSTOLIC BLOOD PRESSURE: 166 MMHG | TEMPERATURE: 97.9 F | HEART RATE: 78 BPM | RESPIRATION RATE: 20 BRPM | DIASTOLIC BLOOD PRESSURE: 89 MMHG | OXYGEN SATURATION: 98 %

## 2021-04-23 DIAGNOSIS — I10 ESSENTIAL HYPERTENSION, BENIGN: Primary | ICD-10-CM

## 2021-04-23 PROCEDURE — 99207 PR NO CHARGE NURSE ONLY: CPT

## 2021-04-23 RX ORDER — ATENOLOL 25 MG/1
25 TABLET ORAL DAILY
Qty: 30 TABLET | Refills: 1 | Status: SHIPPED | OUTPATIENT
Start: 2021-04-23 | End: 2021-05-07

## 2021-04-23 NOTE — NURSING NOTE
"Chief Complaint   Patient presents with     Blood Pressure Check       Initial BP (!) 166/89 (BP Location: Right arm, Patient Position: Sitting, Cuff Size: Adult Regular)   Pulse 78   Temp 97.9  F (36.6  C) (Tympanic)   Resp 20   SpO2 98%  Estimated body mass index is 28.04 kg/m  as calculated from the following:    Height as of 2/15/21: 1.753 m (5' 9\").    Weight as of 2/15/21: 86.1 kg (189 lb 14.4 oz).  Medication Reconciliation: complete  Yesi Salcedo LPN  "

## 2021-04-23 NOTE — PROGRESS NOTES
BP Readings from Last 3 Encounters:   04/23/21 (!) 166/89   03/08/21 (!) 170/82   02/15/21 (!) 144/80     Continue lisinopril 40mg daily    Start atenolol 25mg daily.

## 2021-04-23 NOTE — NURSING NOTE
"Chief Complaint   Patient presents with     Blood Pressure Check       Initial BP (!) 180/98 (BP Location: Right arm, Patient Position: Sitting, Cuff Size: Adult Regular)   Pulse 78   Temp 97.9  F (36.6  C) (Tympanic)   Resp 20   SpO2 98%  Estimated body mass index is 28.04 kg/m  as calculated from the following:    Height as of 2/15/21: 1.753 m (5' 9\").    Weight as of 2/15/21: 86.1 kg (189 lb 14.4 oz).  Medication Reconciliation: complete  Yesi Salcedo LPN  "

## 2021-05-07 ENCOUNTER — TELEPHONE (OUTPATIENT)
Dept: FAMILY MEDICINE | Facility: OTHER | Age: 86
End: 2021-05-07

## 2021-05-07 ENCOUNTER — OFFICE VISIT (OUTPATIENT)
Dept: FAMILY MEDICINE | Facility: OTHER | Age: 86
End: 2021-05-07
Attending: NURSE PRACTITIONER
Payer: MEDICARE

## 2021-05-07 VITALS — SYSTOLIC BLOOD PRESSURE: 166 MMHG | HEART RATE: 72 BPM | DIASTOLIC BLOOD PRESSURE: 90 MMHG

## 2021-05-07 DIAGNOSIS — I50.32 HYPERTENSIVE HEART DISEASE WITH CHRONIC DIASTOLIC CONGESTIVE HEART FAILURE (H): Primary | ICD-10-CM

## 2021-05-07 DIAGNOSIS — I11.0 HYPERTENSIVE HEART DISEASE WITH CHRONIC DIASTOLIC CONGESTIVE HEART FAILURE (H): Primary | ICD-10-CM

## 2021-05-07 DIAGNOSIS — I10 ESSENTIAL HYPERTENSION, BENIGN: ICD-10-CM

## 2021-05-07 PROCEDURE — 99207 PR NO CHARGE NURSE ONLY: CPT

## 2021-05-07 RX ORDER — ATENOLOL 50 MG/1
50 TABLET ORAL DAILY
Qty: 30 TABLET | Refills: 1 | Status: SHIPPED | OUTPATIENT
Start: 2021-05-07 | End: 2021-06-02

## 2021-05-07 RX ORDER — ATENOLOL 25 MG/1
50 TABLET ORAL DAILY
Qty: 30 TABLET | Refills: 1 | Status: SHIPPED | OUTPATIENT
Start: 2021-05-07 | End: 2021-05-07

## 2021-05-07 NOTE — TELEPHONE ENCOUNTER
BP Readings from Last 3 Encounters:   05/07/21 (!) 166/90   04/23/21 (!) 166/89   03/08/21 (!) 170/82     Increase atenolol to 50mg daily  Continue lisinopril 40mg daily.    Please call [facility] - originally sent 25mg take 2 tablets - then sent 50mg tabs with only one daily.  Please call to have them fill the 50mg tablet dose.

## 2021-05-07 NOTE — TELEPHONE ENCOUNTER
Pt calling and had his BP checked today at clinic.He is wondering if there is going to be a BP medication adjustment?    He thought he would have been called.He is concerned because the weekend is coming up.      Call back [phone].      Linda Mar RN

## 2021-05-07 NOTE — TELEPHONE ENCOUNTER
Patient notified and verbalized understanding will finish 25mg bid till gone then get 50mg from pharmacy

## 2021-05-17 ENCOUNTER — OFFICE VISIT (OUTPATIENT)
Dept: FAMILY MEDICINE | Facility: OTHER | Age: 86
End: 2021-05-17
Attending: NURSE PRACTITIONER
Payer: MEDICARE

## 2021-05-17 ENCOUNTER — ANCILLARY PROCEDURE (OUTPATIENT)
Dept: GENERAL RADIOLOGY | Facility: OTHER | Age: 86
End: 2021-05-17
Attending: NURSE PRACTITIONER
Payer: MEDICARE

## 2021-05-17 VITALS
BODY MASS INDEX: 27.4 KG/M2 | HEART RATE: 53 BPM | TEMPERATURE: 98.1 F | SYSTOLIC BLOOD PRESSURE: 160 MMHG | DIASTOLIC BLOOD PRESSURE: 90 MMHG | OXYGEN SATURATION: 98 % | WEIGHT: 185 LBS | HEIGHT: 69 IN

## 2021-05-17 DIAGNOSIS — I50.32 HYPERTENSIVE HEART DISEASE WITH CHRONIC DIASTOLIC CONGESTIVE HEART FAILURE (H): Primary | ICD-10-CM

## 2021-05-17 DIAGNOSIS — M25.561 CHRONIC PAIN OF RIGHT KNEE: ICD-10-CM

## 2021-05-17 DIAGNOSIS — I11.0 HYPERTENSIVE HEART DISEASE WITH CHRONIC DIASTOLIC CONGESTIVE HEART FAILURE (H): Primary | ICD-10-CM

## 2021-05-17 DIAGNOSIS — G89.29 CHRONIC PAIN OF RIGHT KNEE: ICD-10-CM

## 2021-05-17 DIAGNOSIS — N18.31 STAGE 3A CHRONIC KIDNEY DISEASE (H): ICD-10-CM

## 2021-05-17 DIAGNOSIS — M17.11 PRIMARY OSTEOARTHRITIS OF RIGHT KNEE: ICD-10-CM

## 2021-05-17 LAB
ALBUMIN SERPL-MCNC: 3.6 G/DL (ref 3.4–5)
ALP SERPL-CCNC: 77 U/L (ref 40–150)
ALT SERPL W P-5'-P-CCNC: 17 U/L (ref 0–70)
ANION GAP SERPL CALCULATED.3IONS-SCNC: 6 MMOL/L (ref 3–14)
AST SERPL W P-5'-P-CCNC: 18 U/L (ref 0–45)
BASOPHILS # BLD AUTO: 0 10E9/L (ref 0–0.2)
BASOPHILS NFR BLD AUTO: 0.3 %
BILIRUB SERPL-MCNC: 0.5 MG/DL (ref 0.2–1.3)
BUN SERPL-MCNC: 25 MG/DL (ref 7–30)
CALCIUM SERPL-MCNC: 8.3 MG/DL (ref 8.5–10.1)
CHLORIDE SERPL-SCNC: 107 MMOL/L (ref 94–109)
CHOLEST SERPL-MCNC: 145 MG/DL
CO2 SERPL-SCNC: 26 MMOL/L (ref 20–32)
CREAT SERPL-MCNC: 1.35 MG/DL (ref 0.66–1.25)
DIFFERENTIAL METHOD BLD: NORMAL
EOSINOPHIL # BLD AUTO: 0.1 10E9/L (ref 0–0.7)
EOSINOPHIL NFR BLD AUTO: 1.7 %
ERYTHROCYTE [DISTWIDTH] IN BLOOD BY AUTOMATED COUNT: 15 % (ref 10–15)
GFR SERPL CREATININE-BSD FRML MDRD: 47 ML/MIN/{1.73_M2}
GLUCOSE SERPL-MCNC: 95 MG/DL (ref 70–99)
HCT VFR BLD AUTO: 42 % (ref 40–53)
HDLC SERPL-MCNC: 37 MG/DL
HGB BLD-MCNC: 13.6 G/DL (ref 13.3–17.7)
LDLC SERPL CALC-MCNC: 89 MG/DL
LYMPHOCYTES # BLD AUTO: 1.3 10E9/L (ref 0.8–5.3)
LYMPHOCYTES NFR BLD AUTO: 18.2 %
MCH RBC QN AUTO: 29.8 PG (ref 26.5–33)
MCHC RBC AUTO-ENTMCNC: 32.4 G/DL (ref 31.5–36.5)
MCV RBC AUTO: 92 FL (ref 78–100)
MONOCYTES # BLD AUTO: 0.7 10E9/L (ref 0–1.3)
MONOCYTES NFR BLD AUTO: 10.6 %
NEUTROPHILS # BLD AUTO: 4.8 10E9/L (ref 1.6–8.3)
NEUTROPHILS NFR BLD AUTO: 69.2 %
NONHDLC SERPL-MCNC: 108 MG/DL
PLATELET # BLD AUTO: 189 10E9/L (ref 150–450)
POTASSIUM SERPL-SCNC: 4.3 MMOL/L (ref 3.4–5.3)
PROT SERPL-MCNC: 6.9 G/DL (ref 6.8–8.8)
RBC # BLD AUTO: 4.56 10E12/L (ref 4.4–5.9)
SODIUM SERPL-SCNC: 139 MMOL/L (ref 133–144)
T4 FREE SERPL-MCNC: 1.04 NG/DL (ref 0.76–1.46)
TRIGL SERPL-MCNC: 94 MG/DL
TSH SERPL DL<=0.005 MIU/L-ACNC: 4.14 MU/L (ref 0.4–4)
WBC # BLD AUTO: 6.9 10E9/L (ref 4–11)

## 2021-05-17 PROCEDURE — 73562 X-RAY EXAM OF KNEE 3: CPT | Mod: TC,RT,FY

## 2021-05-17 PROCEDURE — G0463 HOSPITAL OUTPT CLINIC VISIT: HCPCS

## 2021-05-17 PROCEDURE — 84443 ASSAY THYROID STIM HORMONE: CPT | Mod: ZL | Performed by: NURSE PRACTITIONER

## 2021-05-17 PROCEDURE — 84439 ASSAY OF FREE THYROXINE: CPT | Mod: ZL | Performed by: NURSE PRACTITIONER

## 2021-05-17 PROCEDURE — 80061 LIPID PANEL: CPT | Mod: ZL | Performed by: NURSE PRACTITIONER

## 2021-05-17 PROCEDURE — 80053 COMPREHEN METABOLIC PANEL: CPT | Mod: ZL | Performed by: NURSE PRACTITIONER

## 2021-05-17 PROCEDURE — 99214 OFFICE O/P EST MOD 30 MIN: CPT | Performed by: NURSE PRACTITIONER

## 2021-05-17 PROCEDURE — 36415 COLL VENOUS BLD VENIPUNCTURE: CPT | Mod: ZL | Performed by: NURSE PRACTITIONER

## 2021-05-17 PROCEDURE — 85025 COMPLETE CBC W/AUTO DIFF WBC: CPT | Mod: ZL | Performed by: NURSE PRACTITIONER

## 2021-05-17 RX ORDER — AMLODIPINE BESYLATE 5 MG/1
5 TABLET ORAL DAILY
Qty: 30 TABLET | Refills: 1 | Status: SHIPPED | OUTPATIENT
Start: 2021-05-17 | End: 2021-06-02

## 2021-05-17 ASSESSMENT — MIFFLIN-ST. JEOR: SCORE: 1509.53

## 2021-05-17 ASSESSMENT — PAIN SCALES - GENERAL: PAINLEVEL: NO PAIN (0)

## 2021-05-17 NOTE — NURSING NOTE
"Chief Complaint   Patient presents with     Hypertension     Heart Failure     Anxiety     Kidney Problem       Initial BP (!) 160/90 (BP Location: Right arm, Patient Position: Chair, Cuff Size: Adult Regular)   Pulse 53   Temp 98.1  F (36.7  C) (Tympanic)   Ht 1.753 m (5' 9\")   Wt 83.9 kg (185 lb)   SpO2 98%   BMI 27.32 kg/m   Estimated body mass index is 27.32 kg/m  as calculated from the following:    Height as of this encounter: 1.753 m (5' 9\").    Weight as of this encounter: 83.9 kg (185 lb).  Medication Reconciliation: complete\  Giselle Wolfe LPN    "

## 2021-05-17 NOTE — PATIENT INSTRUCTIONS
"    Assessment & Plan     1. Hypertensive heart disease with chronic diastolic congestive heart failure (H)  Continue atenolol and lisinopril will add norvasc  - CBC with platelets and differential  - amLODIPine (NORVASC) 5 MG tablet; Take 1 tablet (5 mg) by mouth daily  Dispense: 30 tablet; Refill: 1  - Lipid Profile  - Comprehensive metabolic panel  - TSH with free T4 reflex    2. Stage 3a chronic kidney disease  Do not use NSAIDS    3. Chronic pain of right knee  Arthritis noted   - XR Knee Right 3 Views    4. Primary osteoarthritis of right knee  May consider physical therapy  - ORTHOPEDIC ADULT REFERRAL; Future      Review of the result(s) of each unique test - Knee XR         BMI:   Estimated body mass index is 27.32 kg/m  as calculated from the following:    Height as of this encounter: 1.753 m (5' 9\").    Weight as of this encounter: 83.9 kg (185 lb).           Return in about 2 weeks (around 5/31/2021) for hypertension.    Eunice Dias, NP  [facility]    "

## 2021-05-17 NOTE — LETTER
"My Heart Failure Action Plan   Name: Enzo Colin    YOB: 1934   Date: 4/21/2021    My doctor: Eunice Dias     [facility]     [address]  [address]  [place] [address]  [phone]  My Diagnosis: {HF STAGES:278390}   My Exercise Goal: 30 minutes daily  .     My Weight Plan:   Wt Readings from Last 2 Encounters:   02/15/21 86.1 kg (189 lb 14.4 oz)   04/22/20 84.8 kg (187 lb)     Weigh yourself daily using the same scale. If you gain more than 2 pounds in 24 hours or 5 pounds in a week {HF WEIGHT GOAL:279023}    My Diet Goal: { :410806::\"No added salt\"}    Emergency Room Visits:    Our goal is to improve your quality of life and help you avoid a visit to the emergency room or hospital.  If we work together, we can achieve this goal. But, if you feel you need to call 911 or go to the emergency room, please do so.  If you go to the emergency room, please bring your list of medicines and your daily weight chart with you.       GREEN ZONE     Doing well today    Weight gained is no more than 2 pounds a day or 5 pounds a week.    No swelling in feet, ankles, legs or stomach.    No more swelling than usual.    No more trouble breathing than usual.    No change in my sleep.    No other problems. Actions:    I am doing fine.  I will take my medicine, follow my diet, see my doctor, exercise, and watch for symptoms.           YELLOW ZONE         Having a bad day or flare up    Weight gain of more than 2 pounds in one day or 5 pounds in one week.    New swelling in ankle, leg, knee or thigh.    Bloating in belly, pants feel tighter.    Swelling in hands or face.    Coughing or trouble breathing while walking or talking.    Harder to breathe last night.    Have trouble sleeping, wake up short of breath.    Much more tired than usual.    Not eating.    Pain in my chest or bad leg cramps.    Feel weak or dizzy. Actions:    I need to take action and call my doctor or " nurse today.                 RED ZONE         Need medical care now    Weight gain of 5 pounds overnight.    Chest pain or pressure that does not go away.    Feel less alert.    Wheezing or have trouble breathing when at rest.    Cannot sleep lying down.    Cannot take my water pill.    Pass out or faint. Actions:    I need to call my doctor or nurse now!    Call 911 if I have chest pain or cannot breathe.

## 2021-05-19 NOTE — PROGRESS NOTES
"    Assessment & Plan     1. Hypertensive heart disease with chronic diastolic congestive heart failure (H)  Will continue current plan for another month since pressure are coming down, home readings are mostly in 130's-140's.    Continue lisinopril  - amLODIPine (NORVASC) 5 MG tablet; Take 1 tablet (5 mg) by mouth daily  Dispense: 90 tablet; Refill: 1  - atenolol (TENORMIN) 50 MG tablet; Take 1 tablet (50 mg) by mouth daily  Dispense: 90 tablet; Refill: 1  - continue home blood pressure monitoring.    - low sodium, less than 2000mg daily.       BMI:   Estimated body mass index is 27.32 kg/m  as calculated from the following:    Height as of this encounter: 1.753 m (5' 9\").    Weight as of this encounter: 83.9 kg (185 lb).           Return in about 1 month (around 7/2/2021) for hypertension.    Eunice Dias NP  [facility] [place] Giraldo is a 86 year old who presents for the following health issues     HPI     Hypertension Follow-up      Do you check your blood pressure regularly outside of the clinic? No     Are you following a low salt diet? Yes    Are your blood pressures ever more than 140 on the top number (systolic) OR more   than 90 on the bottom number (diastolic), for example 140/90? No   Home readings 130/80 - 170/80.        How many servings of fruits and vegetables do you eat daily?  0-1    On average, how many sweetened beverages do you drink each day (Examples: soda, juice, sweet tea, etc.  Do NOT count diet or artificially sweetened beverages)?   0    How many days per week do you exercise enough to make your heart beat faster? 3 or less    How many minutes a day do you exercise enough to make your heart beat faster? 9 or less    How many days per week do you miss taking your medication? 0    He is feeling well, no new concerns.      Patient Active Problem List   Diagnosis     Advanced care planning/counseling discussion     Benign prostatic hyperplasia with " lower urinary tract symptoms, symptom details unspecified     Rosacea     Dyspnea     Heart murmur secondary to mitral, tricuspid, and pulmonary insufficiency.     History of tobacco abuse     Hypertensive heart disease with chronic diastolic congestive heart failure (H)     Chronic diastolic heart failure (H)     RBBB (right bundle branch block)     CKD (chronic kidney disease) stage 3, GFR 30-59 ml/min     RUMA (generalized anxiety disorder)     Panic attack     Macular degeneration (senile) of retina     History of nonmelanoma skin cancer     Past Surgical History:   Procedure Laterality Date     discectomy  1986     open reduction internal fixation  2012    Rotator cuff tear (right)     surgical  1996    Bladder cancer       Social History     Tobacco Use     Smoking status: Former Smoker     Packs/day: 0.00     Types: Cigarettes     Smokeless tobacco: Former User     Tobacco comment: tried to quit yes, yr quit 1977   Substance Use Topics     Alcohol use: No     Family History   Problem Relation Age of Onset     Diabetes No family hx of      Hypertension No family hx of      Coronary Artery Disease No family hx of          Current Outpatient Medications   Medication Sig Dispense Refill     amLODIPine (NORVASC) 5 MG tablet Take 1 tablet (5 mg) by mouth daily 90 tablet 1     aspirin 81 MG EC tablet Take 1 tablet (81 mg) by mouth daily 90 tablet 3     atenolol (TENORMIN) 50 MG tablet Take 1 tablet (50 mg) by mouth daily 90 tablet 1     Cyanocobalamin (VITAMIN B-12 SL) Vitamin B Complex - Vitamin B12 1,200mcg/mL Sublingual Drops, take one po daily       doxycycline monohydrate 100 MG capsule Take 100 mg by mouth       lisinopril (ZESTRIL) 40 MG tablet TAKE 1 TABLET DAILY 90 tablet 3     LORazepam (ATIVAN) 0.5 MG tablet Take 1 tablet (0.5 mg) by mouth every 6 hours as needed for anxiety 20 tablet 1     metroNIDAZOLE (METROCREAM) 0.75 % cream Apply by topical route 2 time every day a thin layer to the affected areas in  "the morning and evening       oxybutynin ER (DITROPAN-XL) 5 MG 24 hr tablet Take 5 mg by mouth       tretinoin (RETIN-A) 0.01 % topical gel        loratadine (CLARITIN) 10 MG tablet Take 1 tablet (10 mg) by mouth daily (Patient not taking: Reported on 6/2/2021) 30 tablet 1     triamcinolone (NASACORT) 55 MCG/ACT nasal aerosol Spray 2 sprays into both nostrils daily (Patient not taking: Reported on 6/2/2021) 10.8 g 3     Allergies   Allergen Reactions     Amoxicillin Palpitations     Biaxin [Clarithromycin]      Rapid heart beat, headache     Clindamycin Rash     Sulfa Drugs Rash     Sulfa(Sulfonamide Antibiotics)       Recent Labs   Lab Test 05/17/21  0925 02/15/21  1022 04/22/19  1603 04/22/19  1603   LDL 89 101*  --  92   HDL 37* 44  --  37*   TRIG 94 128  --  144   ALT 17 14  --   --    CR 1.35* 1.35*   < > 1.31*   GFRESTIMATED 47* 47*   < > 49*   GFRESTBLACK 54* 55*   < > 57*   POTASSIUM 4.3 4.3   < > 4.5   TSH 4.14* 4.04*  --  3.53    < > = values in this interval not displayed.      BP Readings from Last 3 Encounters:   06/02/21 (!) 158/76   05/17/21 (!) 160/90   05/07/21 (!) 166/90    Wt Readings from Last 3 Encounters:   06/02/21 83.9 kg (185 lb)   05/17/21 83.9 kg (185 lb)   02/15/21 86.1 kg (189 lb 14.4 oz)                Review of Systems   Constitutional, HEENT, cardiovascular, pulmonary, gi and gu systems are negative, except as otherwise noted.      Objective    BP (!) 158/76 (BP Location: Right arm, Patient Position: Chair, Cuff Size: Adult Regular)   Pulse 60   Temp 98  F (36.7  C) (Tympanic)   Resp 18   Ht 1.753 m (5' 9\")   Wt 83.9 kg (185 lb)   SpO2 98%   BMI 27.32 kg/m    Body mass index is 27.32 kg/m .  Physical Exam   GENERAL: healthy, alert and no distress  MS: no gross musculoskeletal defects noted, no edema  PSYCH: mentation appears normal, affect normal/bright                "

## 2021-05-21 DIAGNOSIS — I10 ESSENTIAL HYPERTENSION, BENIGN: ICD-10-CM

## 2021-05-21 RX ORDER — LISINOPRIL 40 MG/1
TABLET ORAL
Qty: 90 TABLET | Refills: 3 | Status: SHIPPED | OUTPATIENT
Start: 2021-05-21 | End: 2022-07-25

## 2021-05-21 NOTE — TELEPHONE ENCOUNTER
Lisinopril 40 mg      Last Written Prescription Date:  3/08/21  Last Fill Quantity: 90,   # refills: 0  Last Office Visit: 5/17/21  Future Office visit:    Next 5 appointments (look out 90 days)    Jun 02, 2021  8:45 AM  (Arrive by 8:30 AM)  SHORT with Eunice Dias NP  [facility] [address]  [facility] [address]  [phone]           Routing refill request to provider for review/approval because:  Drug not on the [facility], [facility] or [facility] refill protocol or controlled substance

## 2021-06-02 ENCOUNTER — OFFICE VISIT (OUTPATIENT)
Dept: FAMILY MEDICINE | Facility: OTHER | Age: 86
End: 2021-06-02
Attending: NURSE PRACTITIONER
Payer: MEDICARE

## 2021-06-02 VITALS
WEIGHT: 185 LBS | HEIGHT: 69 IN | BODY MASS INDEX: 27.4 KG/M2 | DIASTOLIC BLOOD PRESSURE: 76 MMHG | HEART RATE: 60 BPM | TEMPERATURE: 98 F | RESPIRATION RATE: 18 BRPM | OXYGEN SATURATION: 98 % | SYSTOLIC BLOOD PRESSURE: 158 MMHG

## 2021-06-02 DIAGNOSIS — I50.32 HYPERTENSIVE HEART DISEASE WITH CHRONIC DIASTOLIC CONGESTIVE HEART FAILURE (H): ICD-10-CM

## 2021-06-02 DIAGNOSIS — I11.0 HYPERTENSIVE HEART DISEASE WITH CHRONIC DIASTOLIC CONGESTIVE HEART FAILURE (H): ICD-10-CM

## 2021-06-02 DIAGNOSIS — I10 ESSENTIAL HYPERTENSION, BENIGN: ICD-10-CM

## 2021-06-02 PROCEDURE — G0463 HOSPITAL OUTPT CLINIC VISIT: HCPCS

## 2021-06-02 PROCEDURE — 99214 OFFICE O/P EST MOD 30 MIN: CPT | Performed by: NURSE PRACTITIONER

## 2021-06-02 RX ORDER — ATENOLOL 50 MG/1
50 TABLET ORAL DAILY
Qty: 90 TABLET | Refills: 1 | Status: SHIPPED | OUTPATIENT
Start: 2021-06-02 | End: 2021-11-15

## 2021-06-02 RX ORDER — AMLODIPINE BESYLATE 5 MG/1
5 TABLET ORAL DAILY
Qty: 90 TABLET | Refills: 1 | Status: SHIPPED | OUTPATIENT
Start: 2021-06-02 | End: 2021-11-15

## 2021-06-02 ASSESSMENT — MIFFLIN-ST. JEOR: SCORE: 1509.53

## 2021-06-02 ASSESSMENT — PAIN SCALES - GENERAL: PAINLEVEL: NO PAIN (0)

## 2021-06-02 NOTE — PATIENT INSTRUCTIONS
"  Assessment & Plan     1. Hypertensive heart disease with chronic diastolic congestive heart failure (H)  Continue lisinopril  - amLODIPine (NORVASC) 5 MG tablet; Take 1 tablet (5 mg) by mouth daily  Dispense: 90 tablet; Refill: 1  - atenolol (TENORMIN) 50 MG tablet; Take 1 tablet (50 mg) by mouth daily  Dispense: 90 tablet; Refill: 1       BMI:   Estimated body mass index is 27.32 kg/m  as calculated from the following:    Height as of this encounter: 1.753 m (5' 9\").    Weight as of this encounter: 83.9 kg (185 lb).           Return in about 1 month (around 7/2/2021) for hypertension.    Eunice Dias NP  [facility]    "

## 2021-06-02 NOTE — NURSING NOTE
"Chief Complaint   Patient presents with     Hypertension       Initial BP (!) 183/85 (BP Location: Right arm, Patient Position: Chair, Cuff Size: Adult Regular)   Pulse 60   Temp 98  F (36.7  C) (Tympanic)   Resp 18   Ht 1.753 m (5' 9\")   Wt 83.9 kg (185 lb)   SpO2 98%   BMI 27.32 kg/m   Estimated body mass index is 27.32 kg/m  as calculated from the following:    Height as of this encounter: 1.753 m (5' 9\").    Weight as of this encounter: 83.9 kg (185 lb).  Medication Reconciliation: complete  Pamela M. Lechevalier, LPN    "

## 2021-06-09 ENCOUNTER — OFFICE VISIT (OUTPATIENT)
Dept: ORTHOPEDICS | Facility: OTHER | Age: 86
End: 2021-06-09
Attending: SPECIALIST
Payer: MEDICARE

## 2021-06-09 ENCOUNTER — TRANSFERRED RECORDS (OUTPATIENT)
Dept: HEALTH INFORMATION MANAGEMENT | Facility: CLINIC | Age: 86
End: 2021-06-09

## 2021-06-09 VITALS
SYSTOLIC BLOOD PRESSURE: 156 MMHG | DIASTOLIC BLOOD PRESSURE: 80 MMHG | HEART RATE: 60 BPM | BODY MASS INDEX: 26.88 KG/M2 | WEIGHT: 182 LBS | TEMPERATURE: 98.3 F | OXYGEN SATURATION: 98 %

## 2021-06-09 DIAGNOSIS — M70.61 TROCHANTERIC BURSITIS OF RIGHT HIP: Primary | ICD-10-CM

## 2021-06-09 DIAGNOSIS — M17.11 OSTEOARTHRITIS OF RIGHT KNEE, UNSPECIFIED OSTEOARTHRITIS TYPE: ICD-10-CM

## 2021-06-09 PROCEDURE — G0463 HOSPITAL OUTPT CLINIC VISIT: HCPCS

## 2021-06-09 PROCEDURE — G0463 HOSPITAL OUTPT CLINIC VISIT: HCPCS | Mod: 25

## 2021-06-09 PROCEDURE — 99213 OFFICE O/P EST LOW 20 MIN: CPT | Mod: 25 | Performed by: SPECIALIST

## 2021-06-09 PROCEDURE — 20610 DRAIN/INJ JOINT/BURSA W/O US: CPT | Mod: RT | Performed by: SPECIALIST

## 2021-06-09 RX ORDER — LIDOCAINE HYDROCHLORIDE 10 MG/ML
5 INJECTION, SOLUTION INFILTRATION; PERINEURAL ONCE
Status: COMPLETED | OUTPATIENT
Start: 2021-06-09 | End: 2021-06-09

## 2021-06-09 RX ORDER — TRIAMCINOLONE ACETONIDE 40 MG/ML
40 INJECTION, SUSPENSION INTRA-ARTICULAR; INTRAMUSCULAR ONCE
Status: COMPLETED | OUTPATIENT
Start: 2021-06-09 | End: 2021-06-09

## 2021-06-09 RX ADMIN — TRIAMCINOLONE ACETONIDE 40 MG: 40 INJECTION, SUSPENSION INTRA-ARTICULAR; INTRAMUSCULAR at 11:46

## 2021-06-09 RX ADMIN — LIDOCAINE HYDROCHLORIDE 5 ML: 10 INJECTION, SOLUTION INFILTRATION; PERINEURAL at 11:47

## 2021-06-09 RX ADMIN — LIDOCAINE HYDROCHLORIDE 5 ML: 10 INJECTION, SOLUTION INFILTRATION; PERINEURAL at 11:45

## 2021-06-09 RX ADMIN — TRIAMCINOLONE ACETONIDE 40 MG: 40 INJECTION, SUSPENSION INTRA-ARTICULAR; INTRAMUSCULAR at 11:47

## 2021-06-09 ASSESSMENT — PAIN SCALES - GENERAL: PAINLEVEL: NO PAIN (0)

## 2021-06-09 NOTE — NURSING NOTE
"Chief Complaint   Patient presents with     Consult     NPT  Right Knee          Initial BP (!) 156/80 (BP Location: Left arm, Patient Position: Sitting, Cuff Size: Adult Regular)   Pulse 60   Temp 98.3  F (36.8  C) (Tympanic)   Wt 82.6 kg (182 lb)   SpO2 98%   BMI 26.88 kg/m   Estimated body mass index is 26.88 kg/m  as calculated from the following:    Height as of 6/2/21: 1.753 m (5' 9\").    Weight as of this encounter: 82.6 kg (182 lb).  Medication Reconciliation: complete  Kenyetta Muir LPN  "

## 2021-06-17 NOTE — PROGRESS NOTES
"    Assessment & Plan     1. Hypertensive heart disease with chronic diastolic congestive heart failure (H)  Continue amlodipine, atenolol and lisinopril.      2. Heart murmur secondary to mitral, tricuspid, and pulmonary insufficiency.  As above    3. Stage 3a chronic kidney disease  Do not use NSAIDS, tylenol is ok    4. Benign prostatic hyperplasia with lower urinary tract symptoms, symptom details unspecified  Removed oxybutynin from medication list, ineffective            BMI:   Estimated body mass index is 27.47 kg/m  as calculated from the following:    Height as of this encounter: 1.753 m (5' 9\").    Weight as of this encounter: 84.4 kg (186 lb).     Follow-up in October - labs due at that time.     Eunice Dias NP  [facility] - [place] PHUONG SHEFFIELD is a 86 year old who presents for the following health issues     HPI     Hypertension Follow-up    Do you check your blood pressure regularly outside of the clinic? Yes     Are you following a low salt diet? Yes    Are your blood pressures ever more than 140 on the top number (systolic) OR more   than 90 on the bottom number (diastolic), for example 140/90? Yes- only 2 in the last 5 days   Home readings: 128/62  103/56  138/67  122/61  129/61  136/71  135/68      Heart Failure Follow-up    Are you experiencing any shortness of breath? No    Are you experiencing any swelling in your legs or feet?  No    Are you using more pillows than usual? No    Do you cough at night?  No    Do you check your weight daily?  Yes    Have you had a weight change recently?  No    Are you having any of the following side effects from your medications? (Select all that apply)  The patient does not report symptoms of dizziness, fatigue, cough, swelling, or slow heart beat.    Since your last visit, how many times have you gone to the cardiologist, urgent care, emergency room, or hospital because of your heart failure?   None      How many servings of " fruits and vegetables do you eat daily?  2-3    On average, how many sweetened beverages do you drink each day (Examples: soda, juice, sweet tea, etc.  Do NOT count diet or artificially sweetened beverages)?   1    How many days per week do you exercise enough to make your heart beat faster? 7    How many minutes a day do you exercise enough to make your heart beat faster? 10 - 19    How many days per week do you miss taking your medication? 0    He is feeling well, no new concerns.  Happy with how home readings have been.  His monitor is compared to our readings - his machine is accurate.      Patient Active Problem List   Diagnosis     Advanced care planning/counseling discussion     Benign prostatic hyperplasia with lower urinary tract symptoms, symptom details unspecified     Rosacea     Dyspnea     Heart murmur secondary to mitral, tricuspid, and pulmonary insufficiency.     History of tobacco abuse     Hypertensive heart disease with chronic diastolic congestive heart failure (H)     Chronic diastolic heart failure (H)     RBBB (right bundle branch block)     CKD (chronic kidney disease) stage 3, GFR 30-59 ml/min     RUMA (generalized anxiety disorder)     Panic attack     Macular degeneration (senile) of retina     History of nonmelanoma skin cancer     Past Surgical History:   Procedure Laterality Date     discectomy       open reduction internal fixation  2012    Rotator cuff tear (right)     surgical      Bladder cancer       Social History     Tobacco Use     Smoking status: Former Smoker     Packs/day: 0.00     Types: Cigarettes     Quit date:      Years since quittin.5     Smokeless tobacco: Former User     Quit date: 2001   Substance Use Topics     Alcohol use: No     Family History   Problem Relation Age of Onset     Diabetes No family hx of      Hypertension No family hx of      Coronary Artery Disease No family hx of          Current Outpatient Medications   Medication Sig  Dispense Refill     amLODIPine (NORVASC) 5 MG tablet Take 1 tablet (5 mg) by mouth daily 90 tablet 1     aspirin 81 MG EC tablet Take 1 tablet (81 mg) by mouth daily 90 tablet 3     atenolol (TENORMIN) 50 MG tablet Take 1 tablet (50 mg) by mouth daily 90 tablet 1     Cyanocobalamin (VITAMIN B-12 SL) Vitamin B Complex - Vitamin B12 1,200mcg/mL Sublingual Drops, take one po daily       lisinopril (ZESTRIL) 40 MG tablet TAKE 1 TABLET DAILY 90 tablet 3     loratadine (CLARITIN) 10 MG tablet Take 1 tablet (10 mg) by mouth daily 30 tablet 1     LORazepam (ATIVAN) 0.5 MG tablet Take 1 tablet (0.5 mg) by mouth every 6 hours as needed for anxiety 20 tablet 1     metroNIDAZOLE (METROCREAM) 0.75 % cream Apply by topical route 2 time every day a thin layer to the affected areas in the morning and evening       tretinoin (RETIN-A) 0.01 % topical gel        triamcinolone (NASACORT) 55 MCG/ACT nasal aerosol Spray 2 sprays into both nostrils daily 10.8 g 3     doxycycline monohydrate 100 MG capsule Take 100 mg by mouth       Allergies   Allergen Reactions     Amoxicillin Palpitations     Biaxin [Clarithromycin]      Rapid heart beat, headache     Clindamycin Rash     Sulfa Drugs Rash     Sulfa(Sulfonamide Antibiotics)       Recent Labs   Lab Test 05/17/21  0925 02/15/21  1022 04/22/19  1603 04/22/19  1603   LDL 89 101*  --  92   HDL 37* 44  --  37*   TRIG 94 128  --  144   ALT 17 14  --   --    CR 1.35* 1.35*   < > 1.31*   GFRESTIMATED 47* 47*   < > 49*   GFRESTBLACK 54* 55*   < > 57*   POTASSIUM 4.3 4.3   < > 4.5   TSH 4.14* 4.04*  --  3.53    < > = values in this interval not displayed.      BP Readings from Last 3 Encounters:   07/07/21 (!) 179/86   06/09/21 (!) 156/80   06/02/21 (!) 158/76    Wt Readings from Last 3 Encounters:   07/07/21 84.4 kg (186 lb)   06/09/21 82.6 kg (182 lb)   06/02/21 83.9 kg (185 lb)                      Review of Systems   Constitutional, HEENT, cardiovascular, pulmonary, gi and gu systems are  "negative, except as otherwise noted.      Objective    BP (!) 179/86   Pulse 56   Temp 96.4  F (35.8  C) (Tympanic)   Ht 1.753 m (5' 9\")   Wt 84.4 kg (186 lb)   SpO2 99%   BMI 27.47 kg/m    Body mass index is 27.47 kg/m .  Physical Exam   GENERAL: healthy, alert and no distress  NECK: no adenopathy, no asymmetry, masses, or scars and thyroid normal to palpation  RESP: lungs clear to auscultation - no rales, rhonchi or wheezes  CV: regular rates and rhythm, normal S1 S2, no S3 or S4, grade 3/6  Murmur,  peripheral pulses strong and no peripheral edema today.   MS: no gross musculoskeletal defects noted, no edema  PSYCH: mentation appears normal, affect normal/bright                  "

## 2021-07-02 ENCOUNTER — TELEPHONE (OUTPATIENT)
Dept: FAMILY MEDICINE | Facility: OTHER | Age: 86
End: 2021-07-02

## 2021-07-02 NOTE — TELEPHONE ENCOUNTER
8:09 AM    Reason for Call: BP Concern/Question     Description:     Call from patient requesting a return call from Eunice Dias's Nurse. Patient has questions relating to new BP medication, has appt after the 4th for follow up.     BP prior to medication was 168/76.     Current -59 this morning. Patient concerned if BP is running too low. Denies being lightheaded or dizziness. Patient states he feels good, just concerned if BP is running to low.     Patient is currently prescribed Lisinopril 40 mg, amlodipine 5 mg and atenolol 50 mg.    Please advise.     Patient can be reached at [phone].      Was an appointment offered for this call? No  If yes : Appointment type              Date    Preferred method for responding to this message: Telephone Call  What is your phone number ?    If we cannot reach you directly, may we leave a detailed response at the number you provided? Yes    Can this message wait until your PCP/provider returns, if available today?   Not applicable        Radha Brtio RN

## 2021-07-02 NOTE — TELEPHONE ENCOUNTER
Last week or so bp has been going down.  Sates has been up and down.  Denies any dizziness or lightheadedness

## 2021-07-07 ENCOUNTER — OFFICE VISIT (OUTPATIENT)
Dept: FAMILY MEDICINE | Facility: OTHER | Age: 86
End: 2021-07-07
Attending: NURSE PRACTITIONER
Payer: MEDICARE

## 2021-07-07 VITALS
HEIGHT: 69 IN | HEART RATE: 56 BPM | BODY MASS INDEX: 27.55 KG/M2 | WEIGHT: 186 LBS | DIASTOLIC BLOOD PRESSURE: 86 MMHG | SYSTOLIC BLOOD PRESSURE: 179 MMHG | OXYGEN SATURATION: 99 % | TEMPERATURE: 96.4 F

## 2021-07-07 DIAGNOSIS — I11.0 HYPERTENSIVE HEART DISEASE WITH CHRONIC DIASTOLIC CONGESTIVE HEART FAILURE (H): Primary | ICD-10-CM

## 2021-07-07 DIAGNOSIS — N40.1 BENIGN PROSTATIC HYPERPLASIA WITH LOWER URINARY TRACT SYMPTOMS, SYMPTOM DETAILS UNSPECIFIED: ICD-10-CM

## 2021-07-07 DIAGNOSIS — I50.32 HYPERTENSIVE HEART DISEASE WITH CHRONIC DIASTOLIC CONGESTIVE HEART FAILURE (H): Primary | ICD-10-CM

## 2021-07-07 DIAGNOSIS — R01.1 HEART MURMUR: ICD-10-CM

## 2021-07-07 DIAGNOSIS — N18.31 STAGE 3A CHRONIC KIDNEY DISEASE (H): ICD-10-CM

## 2021-07-07 PROCEDURE — G0463 HOSPITAL OUTPT CLINIC VISIT: HCPCS

## 2021-07-07 PROCEDURE — 99213 OFFICE O/P EST LOW 20 MIN: CPT | Performed by: NURSE PRACTITIONER

## 2021-07-07 ASSESSMENT — MIFFLIN-ST. JEOR: SCORE: 1514.07

## 2021-07-07 ASSESSMENT — PAIN SCALES - GENERAL: PAINLEVEL: NO PAIN (0)

## 2021-07-07 NOTE — NURSING NOTE
"Chief Complaint   Patient presents with     Hypertension     Heart Failure       Initial BP (!) 172/82 (BP Location: Left arm, Patient Position: Chair, Cuff Size: Adult Large)   Pulse 56   Temp 96.4  F (35.8  C) (Tympanic)   Ht 1.753 m (5' 9\")   Wt 84.4 kg (186 lb)   SpO2 99%   BMI 27.47 kg/m   Estimated body mass index is 27.47 kg/m  as calculated from the following:    Height as of this encounter: 1.753 m (5' 9\").    Weight as of this encounter: 84.4 kg (186 lb).  Medication Reconciliation: complete  Giselle Wolfe LPN    "

## 2021-07-07 NOTE — LETTER
"My Heart Failure Action Plan   Name: Enzo Colin    YOB: 1934   Date: 6/17/2021    My doctor: Eunice Dias     [facility]     [address]  [place] [address]  [phone]  My Diagnosis: {HF STAGES:642695}   My Exercise Goal: 30 minutes daily  .     My Weight Plan:   Wt Readings from Last 2 Encounters:   06/09/21 82.6 kg (182 lb)   06/02/21 83.9 kg (185 lb)     Weigh yourself daily using the same scale. If you gain more than 2 pounds in 24 hours or 5 pounds in a week {HF WEIGHT GOAL:915626}    My Diet Goal: { :712032::\"No added salt\"}    Emergency Room Visits:    Our goal is to improve your quality of life and help you avoid a visit to the emergency room or hospital.  If we work together, we can achieve this goal. But, if you feel you need to call 911 or go to the emergency room, please do so.  If you go to the emergency room, please bring your list of medicines and your daily weight chart with you.       GREEN ZONE     Doing well today    Weight gained is no more than 2 pounds a day or 5 pounds a week.    No swelling in feet, ankles, legs or stomach.    No more swelling than usual.    No more trouble breathing than usual.    No change in my sleep.    No other problems. Actions:    I am doing fine.  I will take my medicine, follow my diet, see my doctor, exercise, and watch for symptoms.           YELLOW ZONE         Having a bad day or flare up    Weight gain of more than 2 pounds in one day or 5 pounds in one week.    New swelling in ankle, leg, knee or thigh.    Bloating in belly, pants feel tighter.    Swelling in hands or face.    Coughing or trouble breathing while walking or talking.    Harder to breathe last night.    Have trouble sleeping, wake up short of breath.    Much more tired than usual.    Not eating.    Pain in my chest or bad leg cramps.    Feel weak or dizzy. Actions:    I need to take action and call my doctor or nurse " today.                 RED ZONE         Need medical care now    Weight gain of 5 pounds overnight.    Chest pain or pressure that does not go away.    Feel less alert.    Wheezing or have trouble breathing when at rest.    Cannot sleep lying down.    Cannot take my water pill.    Pass out or faint. Actions:    I need to call my doctor or nurse now!    Call 911 if I have chest pain or cannot breathe.

## 2021-10-13 NOTE — PROGRESS NOTES
"    Assessment & Plan     1. Hypertensive heart disease with chronic diastolic congestive heart failure (H)  Continue current plan   - Lipid Profile; Future  - TSH with free T4 reflex; Future  - Basic metabolic panel; Future    2. Stage 3a chronic kidney disease (H)  Do not use NSAIDS    3. Generalized anxiety disorder  - LORazepam (ATIVAN) 0.5 MG tablet; Take 1 tablet (0.5 mg) by mouth every 6 hours as needed for anxiety  Dispense: 20 tablet; Refill: 1         BMI:   Estimated body mass index is 28.41 kg/m  as calculated from the following:    Height as of 7/7/21: 1.753 m (5' 9\").    Weight as of this encounter: 87.3 kg (192 lb 6.4 oz).   Weight management plan: Discussed healthy diet and exercise guidelines        Return in about 6 months (around 4/14/2022) for hypertension, CKD.    Eunice Dias, NP  [facility] - OSBALDO SHEFFIELD is a 87 year old who presents for the following health issues     HPI     Hypertension Follow-up      Do you check your blood pressure regularly outside of the clinic? Yes     Are you following a low salt diet? Yes    Are your blood pressures ever more than 140 on the top number (systolic) OR more   than 90 on the bottom number (diastolic), for example 140/90? Yes      How many servings of fruits and vegetables do you eat daily?  0-1    On average, how many sweetened beverages do you drink each day (Examples: soda, juice, sweet tea, etc.  Do NOT count diet or artificially sweetened beverages)?   0-1    How many days per week do you exercise enough to make your heart beat faster? 3 or less    How many minutes a day do you exercise enough to make your heart beat faster? 20 - 29    How many days per week do you miss taking your medication? 0    Anxiety - takes ativan at bedtime as needed, requesting refill.      He is otherwise feeling well and has not new concerns.     Patient Active Problem List   Diagnosis     Advanced care planning/counseling discussion "     Benign prostatic hyperplasia with lower urinary tract symptoms, symptom details unspecified     Rosacea     Dyspnea     Heart murmur secondary to mitral, tricuspid, and pulmonary insufficiency.     History of tobacco abuse     Hypertensive heart disease with chronic diastolic congestive heart failure (H)     Chronic diastolic heart failure (H)     RBBB (right bundle branch block)     CKD (chronic kidney disease) stage 3, GFR 30-59 ml/min (H)     RUMA (generalized anxiety disorder)     Panic attack     Macular degeneration (senile) of retina     History of nonmelanoma skin cancer     Past Surgical History:   Procedure Laterality Date     discectomy       open reduction internal fixation      Rotator cuff tear (right)     surgical  1996    Bladder cancer       Social History     Tobacco Use     Smoking status: Former Smoker     Packs/day: 0.00     Types: Cigarettes     Quit date:      Years since quittin.8     Smokeless tobacco: Former User     Quit date: 2001   Substance Use Topics     Alcohol use: No     Family History   Problem Relation Age of Onset     Diabetes No family hx of      Hypertension No family hx of      Coronary Artery Disease No family hx of          Current Outpatient Medications   Medication Sig Dispense Refill     amLODIPine (NORVASC) 5 MG tablet Take 1 tablet (5 mg) by mouth daily 90 tablet 1     aspirin 81 MG EC tablet Take 1 tablet (81 mg) by mouth daily 90 tablet 3     atenolol (TENORMIN) 50 MG tablet Take 1 tablet (50 mg) by mouth daily 90 tablet 1     Cyanocobalamin (VITAMIN B-12 SL) Vitamin B Complex - Vitamin B12 1,200mcg/mL Sublingual Drops, take one po daily       doxycycline monohydrate 100 MG capsule Take 100 mg by mouth       lisinopril (ZESTRIL) 40 MG tablet TAKE 1 TABLET DAILY 90 tablet 3     loratadine (CLARITIN) 10 MG tablet Take 1 tablet (10 mg) by mouth daily 30 tablet 1     LORazepam (ATIVAN) 0.5 MG tablet Take 1 tablet (0.5 mg) by mouth every 6 hours as  needed for anxiety 20 tablet 1     metroNIDAZOLE (METROCREAM) 0.75 % cream Apply by topical route 2 time every day a thin layer to the affected areas in the morning and evening       tretinoin (RETIN-A) 0.01 % topical gel        triamcinolone (NASACORT) 55 MCG/ACT nasal aerosol Spray 2 sprays into both nostrils daily 10.8 g 3     Allergies   Allergen Reactions     Amoxicillin Palpitations     Biaxin [Clarithromycin]      Rapid heart beat, headache     Clindamycin Rash     Sulfa Drugs Rash     Sulfa(Sulfonamide Antibiotics)       Recent Labs   Lab Test 05/17/21  0925 02/15/21  1022 09/05/19  1357 04/22/19  1603   LDL 89 101*  --  92   HDL 37* 44  --  37*   TRIG 94 128  --  144   ALT 17 14  --   --    CR 1.35* 1.35*   < > 1.31*   GFRESTIMATED 47* 47*   < > 49*   GFRESTBLACK 54* 55*   < > 57*   POTASSIUM 4.3 4.3   < > 4.5   TSH 4.14* 4.04*  --  3.53    < > = values in this interval not displayed.      BP Readings from Last 3 Encounters:   10/14/21 132/78   07/07/21 (!) 179/86   06/09/21 (!) 156/80    Wt Readings from Last 3 Encounters:   10/14/21 87.3 kg (192 lb 6.4 oz)   07/07/21 84.4 kg (186 lb)   06/09/21 82.6 kg (182 lb)                      Review of Systems   Constitutional, HEENT, cardiovascular, pulmonary, gi and gu systems are negative, except as otherwise noted.      Objective    /78 (BP Location: Right arm, Patient Position: Sitting, Cuff Size: Adult Regular)   Pulse 62   Temp 97.4  F (36.3  C) (Tympanic)   Resp 18   Wt 87.3 kg (192 lb 6.4 oz)   SpO2 98%   BMI 28.41 kg/m    Body mass index is 28.41 kg/m .  Physical Exam   GENERAL: healthy, alert and no distress  NECK: no adenopathy, no asymmetry, masses, or scars, thyroid normal to palpation and no carotid bruits  RESP: lungs clear to auscultation - no rales, rhonchi or wheezes  CV: regular rate and rhythm, normal S1 S2, no S3 or S4, no murmur, click or rub, no peripheral edema and peripheral pulses strong  MS: no gross musculoskeletal defects  noted, no edema  PSYCH: mentation appears normal, affect normal/bright

## 2021-10-14 ENCOUNTER — OFFICE VISIT (OUTPATIENT)
Dept: FAMILY MEDICINE | Facility: OTHER | Age: 86
End: 2021-10-14
Attending: NURSE PRACTITIONER
Payer: MEDICARE

## 2021-10-14 VITALS
TEMPERATURE: 97.4 F | HEART RATE: 62 BPM | OXYGEN SATURATION: 98 % | RESPIRATION RATE: 18 BRPM | WEIGHT: 192.4 LBS | BODY MASS INDEX: 28.41 KG/M2 | SYSTOLIC BLOOD PRESSURE: 132 MMHG | DIASTOLIC BLOOD PRESSURE: 78 MMHG

## 2021-10-14 DIAGNOSIS — F41.1 GENERALIZED ANXIETY DISORDER: ICD-10-CM

## 2021-10-14 DIAGNOSIS — I11.0 HYPERTENSIVE HEART DISEASE WITH CHRONIC DIASTOLIC CONGESTIVE HEART FAILURE (H): Primary | ICD-10-CM

## 2021-10-14 DIAGNOSIS — I50.32 HYPERTENSIVE HEART DISEASE WITH CHRONIC DIASTOLIC CONGESTIVE HEART FAILURE (H): Primary | ICD-10-CM

## 2021-10-14 DIAGNOSIS — N18.31 STAGE 3A CHRONIC KIDNEY DISEASE (H): ICD-10-CM

## 2021-10-14 LAB
ANION GAP SERPL CALCULATED.3IONS-SCNC: 3 MMOL/L (ref 3–14)
BUN SERPL-MCNC: 24 MG/DL (ref 7–30)
CALCIUM SERPL-MCNC: 8.6 MG/DL (ref 8.5–10.1)
CHLORIDE BLD-SCNC: 109 MMOL/L (ref 94–109)
CHOLEST SERPL-MCNC: 186 MG/DL
CO2 SERPL-SCNC: 27 MMOL/L (ref 20–32)
CREAT SERPL-MCNC: 1.35 MG/DL (ref 0.66–1.25)
FASTING STATUS PATIENT QL REPORTED: NO
GFR SERPL CREATININE-BSD FRML MDRD: 47 ML/MIN/1.73M2
GLUCOSE BLD-MCNC: 92 MG/DL (ref 70–99)
HDLC SERPL-MCNC: 42 MG/DL
HOLD SPECIMEN: NORMAL
LDLC SERPL CALC-MCNC: 120 MG/DL
NONHDLC SERPL-MCNC: 144 MG/DL
POTASSIUM BLD-SCNC: 4.4 MMOL/L (ref 3.4–5.3)
SODIUM SERPL-SCNC: 139 MMOL/L (ref 133–144)
TRIGL SERPL-MCNC: 121 MG/DL
TSH SERPL DL<=0.005 MIU/L-ACNC: 3.46 MU/L (ref 0.4–4)

## 2021-10-14 PROCEDURE — G0463 HOSPITAL OUTPT CLINIC VISIT: HCPCS | Mod: 25

## 2021-10-14 PROCEDURE — 80061 LIPID PANEL: CPT | Mod: ZL | Performed by: NURSE PRACTITIONER

## 2021-10-14 PROCEDURE — 84443 ASSAY THYROID STIM HORMONE: CPT | Mod: ZL | Performed by: NURSE PRACTITIONER

## 2021-10-14 PROCEDURE — G0463 HOSPITAL OUTPT CLINIC VISIT: HCPCS

## 2021-10-14 PROCEDURE — 80048 BASIC METABOLIC PNL TOTAL CA: CPT | Mod: ZL | Performed by: NURSE PRACTITIONER

## 2021-10-14 PROCEDURE — 36415 COLL VENOUS BLD VENIPUNCTURE: CPT | Mod: ZL | Performed by: NURSE PRACTITIONER

## 2021-10-14 PROCEDURE — 99214 OFFICE O/P EST MOD 30 MIN: CPT | Performed by: NURSE PRACTITIONER

## 2021-10-14 RX ORDER — LORAZEPAM 0.5 MG/1
0.5 TABLET ORAL EVERY 6 HOURS PRN
Qty: 20 TABLET | Refills: 1 | Status: SHIPPED | OUTPATIENT
Start: 2021-10-14 | End: 2022-11-21

## 2021-10-14 ASSESSMENT — PAIN SCALES - GENERAL: PAINLEVEL: NO PAIN (0)

## 2021-10-14 NOTE — PATIENT INSTRUCTIONS
"  Assessment & Plan     1. Hypertensive heart disease with chronic diastolic congestive heart failure (H)  Continue current plan   - Lipid Profile; Future  - TSH with free T4 reflex; Future  - Basic metabolic panel; Future    2. Stage 3a chronic kidney disease (H)  Do not use NSAIDS    3. Generalized anxiety disorder  - LORazepam (ATIVAN) 0.5 MG tablet; Take 1 tablet (0.5 mg) by mouth every 6 hours as needed for anxiety  Dispense: 20 tablet; Refill: 1         BMI:   Estimated body mass index is 28.41 kg/m  as calculated from the following:    Height as of 7/7/21: 1.753 m (5' 9\").    Weight as of this encounter: 87.3 kg (192 lb 6.4 oz).   Weight management plan: Discussed healthy diet and exercise guidelines        Return in about 6 months (around 4/14/2022) for hypertension, CKD.    Eunice Dias, NP  [facility]    "

## 2021-10-25 ENCOUNTER — ALLIED HEALTH/NURSE VISIT (OUTPATIENT)
Dept: FAMILY MEDICINE | Facility: OTHER | Age: 86
End: 2021-10-25
Attending: NURSE PRACTITIONER
Payer: MEDICARE

## 2021-10-25 DIAGNOSIS — Z23 NEED FOR PROPHYLACTIC VACCINATION AND INOCULATION AGAINST INFLUENZA: Primary | ICD-10-CM

## 2021-10-25 PROCEDURE — G0008 ADMIN INFLUENZA VIRUS VAC: HCPCS

## 2021-10-25 PROCEDURE — 90662 IIV NO PRSV INCREASED AG IM: CPT

## 2021-11-12 DIAGNOSIS — I11.0 HYPERTENSIVE HEART DISEASE WITH CHRONIC DIASTOLIC CONGESTIVE HEART FAILURE (H): ICD-10-CM

## 2021-11-12 DIAGNOSIS — I50.32 HYPERTENSIVE HEART DISEASE WITH CHRONIC DIASTOLIC CONGESTIVE HEART FAILURE (H): ICD-10-CM

## 2021-11-12 DIAGNOSIS — I10 ESSENTIAL HYPERTENSION, BENIGN: ICD-10-CM

## 2021-11-13 NOTE — TELEPHONE ENCOUNTER
ATENOLOL      Last Written Prescription Date:  6-2-2021  Last Fill Quantity: 90,   # refills: 1  Last Office Visit: 10-  Future Office visit:       Routing refill request to provider for review/approval because:          NORVASC      Last Written Prescription Date:  6-2-2021  Last Fill Quantity: 90,   # refills: 1  Last Office Visit: 10-  Future Office visit:       Routing refill request to provider for review/approval because:

## 2021-11-15 RX ORDER — AMLODIPINE BESYLATE 5 MG/1
TABLET ORAL
Qty: 90 TABLET | Refills: 3 | Status: SHIPPED | OUTPATIENT
Start: 2021-11-15 | End: 2022-10-31

## 2021-11-15 RX ORDER — ATENOLOL 50 MG/1
TABLET ORAL
Qty: 90 TABLET | Refills: 3 | Status: SHIPPED | OUTPATIENT
Start: 2021-11-15 | End: 2022-07-14

## 2022-01-26 ENCOUNTER — OFFICE VISIT (OUTPATIENT)
Dept: ORTHOPEDICS | Facility: OTHER | Age: 87
End: 2022-01-26
Attending: SPECIALIST
Payer: MEDICARE

## 2022-01-26 ENCOUNTER — ANCILLARY PROCEDURE (OUTPATIENT)
Dept: GENERAL RADIOLOGY | Facility: OTHER | Age: 87
End: 2022-01-26
Attending: SPECIALIST
Payer: MEDICARE

## 2022-01-26 VITALS
HEART RATE: 65 BPM | OXYGEN SATURATION: 97 % | BODY MASS INDEX: 26.66 KG/M2 | SYSTOLIC BLOOD PRESSURE: 140 MMHG | RESPIRATION RATE: 18 BRPM | DIASTOLIC BLOOD PRESSURE: 80 MMHG | HEIGHT: 69 IN | WEIGHT: 180 LBS

## 2022-01-26 DIAGNOSIS — M25.551 RIGHT HIP PAIN: ICD-10-CM

## 2022-01-26 DIAGNOSIS — M25.551 HIP PAIN, RIGHT: Primary | ICD-10-CM

## 2022-01-26 PROCEDURE — G0463 HOSPITAL OUTPT CLINIC VISIT: HCPCS

## 2022-01-26 PROCEDURE — 99213 OFFICE O/P EST LOW 20 MIN: CPT | Performed by: SPECIALIST

## 2022-01-26 PROCEDURE — 73502 X-RAY EXAM HIP UNI 2-3 VIEWS: CPT | Mod: TC,FY

## 2022-01-26 PROCEDURE — G0463 HOSPITAL OUTPT CLINIC VISIT: HCPCS | Mod: 25

## 2022-01-26 ASSESSMENT — MIFFLIN-ST. JEOR: SCORE: 1481.85

## 2022-01-26 ASSESSMENT — PAIN SCALES - GENERAL: PAINLEVEL: MODERATE PAIN (4)

## 2022-01-26 NOTE — NURSING NOTE
"No chief complaint on file.      Initial BP (!) 140/80 (BP Location: Left arm, Cuff Size: Adult Regular)   Pulse 65   Resp 18   Ht 1.753 m (5' 9\")   Wt 81.6 kg (180 lb)   SpO2 97%   BMI 26.58 kg/m   Estimated body mass index is 26.58 kg/m  as calculated from the following:    Height as of this encounter: 1.753 m (5' 9\").    Weight as of this encounter: 81.6 kg (180 lb).  Medication Reconciliation: complete  Emily Meadows LPN    "

## 2022-01-26 NOTE — PROGRESS NOTES
Service Date: 01/26/2022    HISTORY:  The patient is an 87-year-old male seen today for followup evaluation for his right hip and right knee.  He has been complaining of right hip pain, 5/10.  He had been seen previously, last June I think.  He had an injection for trochanteric bursitis and an injection in his knee.  Did feel like these helped.  His complaints at this point are more deeper hip pain anteriorly.  He also has a little bit of a trochanteric pain, but more pain deep in the groin.  He also has some thigh pain.  He complains of some occasional knee pain as well that feels is more centered on his hip.  He comes in today to have this evaluated.  He has not had any recent hip x-rays, so we did some hip x-rays today.  Hip x-rays included an AP pelvis and lateral view of the right hip that showed severe osteoarthritis of his right hip.    PHYSICAL EXAMINATION:    GENERAL:  Today, the patient is awake, alert, oriented, no acute distress.  Overall, general mood, affect, appearance are normal.  VITAL SIGNS:  Stable.  EXTREMITIES:  On evaluation of the right lower extremity, he has some pain with Stinchfield log roll maneuvers of his hip.  He has good flexion past 90 degrees, internal rotation to about 30, external rotation 30, abduction about 30.  He has full range of motion of his knee.  His knee is nontender.  There is no swelling, discoloration, deformity, or effusion.  The knee is stable on ligamentous exam.  He is nontender over the joint lines of his knee.  He does not have any significant crepitation today.  He is neurovascularly intact distally.      IMAGING:  He has knee x-rays from his previous visit that showed some mild symmetrical joint space narrowing in the medial, lateral and patellofemoral compartments.    ASSESSMENT AND PLAN:  I think today's symptoms seem to be more consistent with his deep hip pain being from osteoarthritis.  I recommended to the patient that we consider setting him up for an  intra-articular injection under fluoroscopy.  Ultimately, he might need a hip replacement, but I think a hip injection might be a worthy endeavor to help diagnose his pain is coming from his hip and also it might be a good therapeutic measure to alleviate his symptoms temporarily.  We will set this up down in [place] at the [facility] to be done as an outpatient.  It will be a right hip intra-articular injection under fluoroscopy.    Eliud Santos MD        D: 2022   T: 2022   MT: [id number]    Name:     REJI MATHEWScott  MRN:      [id number]        Account:      [id number]   :      10/10/1934           Service Date: 2022       Document: [id number]

## 2022-03-04 NOTE — PROGRESS NOTES
[facility]  [address]  [facility] [address]  Phone: [phone]  Primary Provider: Eunice Cerna  Pre-op Performing Provider: EUNICE CERNA      PREOPERATIVE EVALUATION:  Today's date: 3/9/2022    Enzo Coiln is a 87 year old male who presents for a preoperative evaluation.    Surgical Information:  Surgery/Procedure: Right Hip Injection   Surgery Location: [facility]  Surgeon: Dr. Santos   Surgery Date: 3/29/22  Time of Surgery: TBD  Where patient plans to recover: At home with family  Fax number for surgical facility: Note does not need to be faxed, will be available electronically in Epic.    Type of Anesthesia Anticipated: to be determined    Assessment & Plan     The proposed surgical procedure is considered INTERMEDIATE risk.    1. Pre-operative examination  Exam completred   - Basic metabolic panel; Future  - CBC with Platelets & Differential; Future  - EKG 12-lead complete w/read - (Clinic Performed)    2. Primary osteoarthritis of right hip  Wheeled walker with seat to [place]   - Miscellaneous Order for DME - ONLY FOR DME    3. Hypertensive heart disease with chronic diastolic congestive heart failure (H)    4. RBBB (right bundle branch block)    5. Stage 3a chronic kidney disease (H)    6. RUMA (generalized anxiety disorder)    7. Unsteady gait  - Miscellaneous Order for DME - ONLY FOR DME           Risks and Recommendations:  The patient has the following additional risks and recommendations for perioperative complications:   - No identified additional risk factors other than previously addressed    Medication Instructions:  hold all medications the day of procedure.     RECOMMENDATION:  APPROVAL GIVEN to proceed with proposed procedure, without further diagnostic evaluation.        Subjective     HPI related to upcoming procedure: chronic pain of right hip due to arthritis.      covid vaccine current, no covid  testing required.      Preop Questions 3/24/2022   1. Have you ever had a heart attack or stroke? No   2. Have you ever had surgery on your heart or blood vessels, such as a stent placement, a coronary artery bypass, or surgery on an artery in your head, neck, heart, or legs? No   3. Do you have chest pain with activity? No   4. Do you have a history of  heart failure? No   5. Do you currently have a cold, bronchitis or symptoms of other infection? No   6. Do you have a cough, shortness of breath, or wheezing? No   7. Do you or anyone in your family have previous history of blood clots? No   8. Do you or does anyone in your family have a serious bleeding problem such as prolonged bleeding following surgeries or cuts? No   9. Have you ever had problems with anemia or been told to take iron pills? No   10. Have you had any abnormal blood loss such as black, tarry or bloody stools? No   11. Have you ever had a blood transfusion? No   12. Are you willing to have a blood transfusion if it is medically needed before, during, or after your surgery? Yes   13. Have you or any of your relatives ever had problems with anesthesia? No   14. Do you have sleep apnea, excessive snoring or daytime drowsiness? No   15. Do you have any artifical heart valves or other implanted medical devices like a pacemaker, defibrillator, or continuous glucose monitor? No   16. Do you have artificial joints? No   17. Are you allergic to latex? No     Health Care Directive:  Patient does not have a Health Care Directive or Living Will:     Preoperative Review of :   reviewed - controlled substances reflected in medication list.      Status of Chronic Conditions:  CAD - Patient has a longstanding history of moderate-severe CAD. Patient denies recent chest pain or NTG use, denies exercise induced dyspnea or PND.  EKG today    Home blood pressure readings have been range.  .     CHF - Patient has a longstanding history of moderate-severe CHF.  Exacerbating conditions include hypertension. Currently the patient's condition is same. Current treatment regimen includes ACE inhibitor, beta blocker and calcium channel blocker. The patient denies chest pain, edema, orthopnea, SOB or recent weight gain.     RENAL INSUFFICIENCY - Patient has a longstanding history of moderate-severe chronic renal insufficiency.    Creatinine   Date Value Ref Range Status   03/24/2022 1.30 (H) 0.66 - 1.25 mg/dL Final   05/17/2021 1.35 (H) 0.66 - 1.25 mg/dL Final         Review of Systems  CONSTITUTIONAL: NEGATIVE for fever, chills, change in weight  INTEGUMENTARY/SKIN: NEGATIVE for worrisome rashes, moles or lesions  EYES: NEGATIVE for vision changes or irritation  ENT/MOUTH: NEGATIVE for ear, mouth and throat problems  RESP: NEGATIVE for significant cough or SOB  CV: NEGATIVE for chest pain, palpitations or peripheral edema  GI: NEGATIVE for nausea, abdominal pain, heartburn, or change in bowel habits  : NEGATIVE for frequency, dysuria, or hematuria  MUSCULOSKELETAL: NEGATIVE for significant arthralgias or myalgia  NEURO: NEGATIVE for weakness, dizziness or paresthesias  ENDOCRINE: NEGATIVE for temperature intolerance, skin/hair changes  HEME: NEGATIVE for bleeding problems  PSYCHIATRIC: NEGATIVE for changes in mood or affect    Patient Active Problem List    Diagnosis Date Noted     Dyspnea 07/06/2018     Priority: Medium     Heart murmur secondary to mitral, tricuspid, and pulmonary insufficiency. 07/06/2018     Priority: Medium     History of tobacco abuse 07/06/2018     Priority: Medium     Hypertensive heart disease with chronic diastolic congestive heart failure (H) 07/06/2018     Priority: Medium     Chronic diastolic heart failure (H) 07/06/2018     Priority: Medium     RBBB (right bundle branch block) 07/06/2018     Priority: Medium     CKD (chronic kidney disease) stage 3, GFR 30-59 ml/min (H) 07/06/2018     Priority: Medium     RUMA (generalized anxiety disorder)  07/06/2018     Priority: Medium     Panic attack 07/06/2018     Priority: Medium     Macular degeneration (senile) of retina 07/06/2018     Priority: Medium     History of nonmelanoma skin cancer 04/03/2018     Priority: Medium     BCC left jawline 2017 Mohs, BCC left jaw 2011       Benign prostatic hyperplasia with lower urinary tract symptoms, symptom details unspecified 01/29/2018     Priority: Medium     Rosacea 01/29/2018     Priority: Medium     Advanced care planning/counseling discussion 08/03/2012     Priority: Medium      Past Medical History:   Diagnosis Date     Anxiety      Chronic kidney disease      Sprain of unspecified site of knee and leg 09/07/2000     Past Surgical History:   Procedure Laterality Date     discectomy  1986     open reduction internal fixation  2012    Rotator cuff tear (right)     surgical  1996    Bladder cancer     Current Outpatient Medications   Medication Sig Dispense Refill     amLODIPine (NORVASC) 5 MG tablet TAKE 1 TABLET DAILY 90 tablet 3     aspirin 81 MG EC tablet Take 1 tablet (81 mg) by mouth daily 90 tablet 3     atenolol (TENORMIN) 50 MG tablet TAKE 1 TABLET DAILY 90 tablet 3     Cyanocobalamin (VITAMIN B-12 SL) Vitamin B Complex - Vitamin B12 1,200mcg/mL Sublingual Drops, take one po daily       doxycycline monohydrate 100 MG capsule Take 100 mg by mouth       lisinopril (ZESTRIL) 40 MG tablet TAKE 1 TABLET DAILY 90 tablet 3     loratadine (CLARITIN) 10 MG tablet Take 1 tablet (10 mg) by mouth daily 30 tablet 1     LORazepam (ATIVAN) 0.5 MG tablet Take 1 tablet (0.5 mg) by mouth every 6 hours as needed for anxiety 20 tablet 1     metroNIDAZOLE (METROCREAM) 0.75 % cream Apply by topical route 2 time every day a thin layer to the affected areas in the morning and evening       tretinoin (RETIN-A) 0.01 % topical gel        triamcinolone (NASACORT) 55 MCG/ACT nasal aerosol Spray 2 sprays into both nostrils daily 10.8 g 3       Allergies   Allergen Reactions      "Amoxicillin Palpitations     Biaxin [Clarithromycin]      Rapid heart beat, headache     Clindamycin Rash     Sulfa Drugs Rash     Sulfa(Sulfonamide Antibiotics)          Social History     Tobacco Use     Smoking status: Former Smoker     Packs/day: 0.00     Types: Cigarettes     Quit date: 1975     Years since quittin.2     Smokeless tobacco: Former User     Quit date: 2001   Substance Use Topics     Alcohol use: No       History   Drug Use No         Objective     /66 (BP Location: Left arm, Patient Position: Chair, Cuff Size: Adult Large)   Pulse 60   Temp 97.5  F (36.4  C) (Tympanic)   Resp 24   Ht 1.753 m (5' 9\")   Wt 81.6 kg (180 lb)   SpO2 100%   BMI 26.58 kg/m      Physical Exam    GENERAL APPEARANCE: alert, no distress-very pleasant elderly male     EYES: EOMI, PERRL     HENT: ear canals and TM's normal and nose and mouth without ulcers or lesions     NECK: no adenopathy, no asymmetry, masses, or scars and thyroid normal to palpation     RESP: lungs clear to auscultation - no rales, rhonchi or wheezes     CV: regular rates and rhythm, normal S1 S2, no S3 or S4 and no murmur, click or rub     ABDOMEN:  soft, nontender, no HSM or masses and bowel sounds normal     MS: extremities normal- no gross deformities noted, no evidence of inflammation in joints, FROM in all extremities.     SKIN: no suspicious lesions or rashes     NEURO: Normal strength and tone, sensory exam grossly normal, mentation intact and speech normal     PSYCH: mentation appears normal. and affect normal/bright     LYMPHATICS: No cervical adenopathy    Recent Labs   Lab Test 10/14/21  1036 21  0925   HGB  --  13.6   PLT  --  189    139   POTASSIUM 4.4 4.3   CR 1.35* 1.35*        Diagnostics:  Results for orders placed or performed in visit on 22   Basic metabolic panel     Status: Abnormal   Result Value Ref Range    Sodium 138 133 - 144 mmol/L    Potassium 4.3 3.4 - 5.3 mmol/L    Chloride 106 94 - " 109 mmol/L    Carbon Dioxide (CO2) 29 20 - 32 mmol/L    Anion Gap 3 3 - 14 mmol/L    Urea Nitrogen 23 7 - 30 mg/dL    Creatinine 1.30 (H) 0.66 - 1.25 mg/dL    Calcium 8.7 8.5 - 10.1 mg/dL    Glucose 114 (H) 70 - 99 mg/dL    GFR Estimate 53 (L) >60 mL/min/1.73m2   CBC with platelets and differential     Status: None   Result Value Ref Range    WBC Count 8.1 4.0 - 11.0 10e3/uL    RBC Count 4.45 4.40 - 5.90 10e6/uL    Hemoglobin 13.6 13.3 - 17.7 g/dL    Hematocrit 42.0 40.0 - 53.0 %    MCV 94 78 - 100 fL    MCH 30.6 26.5 - 33.0 pg    MCHC 32.4 31.5 - 36.5 g/dL    RDW 14.8 10.0 - 15.0 %    Platelet Count 187 150 - 450 10e3/uL    % Neutrophils 67 %    % Lymphocytes 20 %    % Monocytes 10 %    % Eosinophils 3 %    % Basophils 0 %    Absolute Neutrophils 5.4 1.6 - 8.3 10e3/uL    Absolute Lymphocytes 1.6 0.8 - 5.3 10e3/uL    Absolute Monocytes 0.8 0.0 - 1.3 10e3/uL    Absolute Eosinophils 0.2 0.0 - 0.7 10e3/uL    Absolute Basophils 0.0 0.0 - 0.2 10e3/uL   CBC with Platelets & Differential     Status: None    Narrative    The following orders were created for panel order CBC with Platelets & Differential.  Procedure                               Abnormality         Status                     ---------                               -----------         ------                     CBC with platelets and d...[[id number]]                      Final result                 Please view results for these tests on the individual orders.        EKG: sinus tachycardia, normal axis, normal intervals, no acute ST/T changes c/w ischemia, no LVH by voltage criteria, unchanged from previous tracings    PROCEDURE:  XR HIP RIGHT 2-3 VIEWS     HISTORY: Right hip pain     COMPARISON:  None.     TECHNIQUE:  2 views of the right hip were obtained.     FINDINGS:  There is severe joint space narrowing at the right hip  joint with subchondral cystic change and degenerative osteophytes.  Mild degenerative changes are seen in the left hip. The lower half  of  the pelvis and both proximal femurs are intact.                                                                       IMPRESSION: Advanced arthritic changes at the right hip joint.       MANOLO HAIR MD          Revised Cardiac Risk Index (RCRI):  The patient has the following serious cardiovascular risks for perioperative complications:   - No serious cardiac risks = 0 points     RCRI Interpretation: 0 points: Class I (very low risk - 0.4% complication rate)           Signed Electronically by: Eunice Dias NP  Copy of this evaluation report is provided to requesting physician.

## 2022-03-24 ENCOUNTER — OFFICE VISIT (OUTPATIENT)
Dept: FAMILY MEDICINE | Facility: OTHER | Age: 87
End: 2022-03-24
Attending: NURSE PRACTITIONER
Payer: MEDICARE

## 2022-03-24 VITALS
BODY MASS INDEX: 26.66 KG/M2 | TEMPERATURE: 97.5 F | SYSTOLIC BLOOD PRESSURE: 136 MMHG | RESPIRATION RATE: 24 BRPM | HEIGHT: 69 IN | WEIGHT: 180 LBS | OXYGEN SATURATION: 100 % | HEART RATE: 60 BPM | DIASTOLIC BLOOD PRESSURE: 66 MMHG

## 2022-03-24 DIAGNOSIS — M16.11 PRIMARY OSTEOARTHRITIS OF RIGHT HIP: ICD-10-CM

## 2022-03-24 DIAGNOSIS — F41.1 GAD (GENERALIZED ANXIETY DISORDER): ICD-10-CM

## 2022-03-24 DIAGNOSIS — I45.10 RBBB (RIGHT BUNDLE BRANCH BLOCK): ICD-10-CM

## 2022-03-24 DIAGNOSIS — I11.0 HYPERTENSIVE HEART DISEASE WITH CHRONIC DIASTOLIC CONGESTIVE HEART FAILURE (H): ICD-10-CM

## 2022-03-24 DIAGNOSIS — N18.31 STAGE 3A CHRONIC KIDNEY DISEASE (H): ICD-10-CM

## 2022-03-24 DIAGNOSIS — R26.81 UNSTEADY GAIT: ICD-10-CM

## 2022-03-24 DIAGNOSIS — Z01.818 PRE-OPERATIVE EXAMINATION: Primary | ICD-10-CM

## 2022-03-24 DIAGNOSIS — I50.32 HYPERTENSIVE HEART DISEASE WITH CHRONIC DIASTOLIC CONGESTIVE HEART FAILURE (H): ICD-10-CM

## 2022-03-24 LAB
ANION GAP SERPL CALCULATED.3IONS-SCNC: 3 MMOL/L (ref 3–14)
BASOPHILS # BLD AUTO: 0 10E3/UL (ref 0–0.2)
BASOPHILS NFR BLD AUTO: 0 %
BUN SERPL-MCNC: 23 MG/DL (ref 7–30)
CALCIUM SERPL-MCNC: 8.7 MG/DL (ref 8.5–10.1)
CHLORIDE BLD-SCNC: 106 MMOL/L (ref 94–109)
CO2 SERPL-SCNC: 29 MMOL/L (ref 20–32)
CREAT SERPL-MCNC: 1.3 MG/DL (ref 0.66–1.25)
EOSINOPHIL # BLD AUTO: 0.2 10E3/UL (ref 0–0.7)
EOSINOPHIL NFR BLD AUTO: 3 %
ERYTHROCYTE [DISTWIDTH] IN BLOOD BY AUTOMATED COUNT: 14.8 % (ref 10–15)
GFR SERPL CREATININE-BSD FRML MDRD: 53 ML/MIN/1.73M2
GLUCOSE BLD-MCNC: 114 MG/DL (ref 70–99)
HCT VFR BLD AUTO: 42 % (ref 40–53)
HGB BLD-MCNC: 13.6 G/DL (ref 13.3–17.7)
LYMPHOCYTES # BLD AUTO: 1.6 10E3/UL (ref 0.8–5.3)
LYMPHOCYTES NFR BLD AUTO: 20 %
MCH RBC QN AUTO: 30.6 PG (ref 26.5–33)
MCHC RBC AUTO-ENTMCNC: 32.4 G/DL (ref 31.5–36.5)
MCV RBC AUTO: 94 FL (ref 78–100)
MONOCYTES # BLD AUTO: 0.8 10E3/UL (ref 0–1.3)
MONOCYTES NFR BLD AUTO: 10 %
NEUTROPHILS # BLD AUTO: 5.4 10E3/UL (ref 1.6–8.3)
NEUTROPHILS NFR BLD AUTO: 67 %
PLATELET # BLD AUTO: 187 10E3/UL (ref 150–450)
POTASSIUM BLD-SCNC: 4.3 MMOL/L (ref 3.4–5.3)
RBC # BLD AUTO: 4.45 10E6/UL (ref 4.4–5.9)
SODIUM SERPL-SCNC: 138 MMOL/L (ref 133–144)
WBC # BLD AUTO: 8.1 10E3/UL (ref 4–11)

## 2022-03-24 PROCEDURE — G0463 HOSPITAL OUTPT CLINIC VISIT: HCPCS | Mod: 25

## 2022-03-24 PROCEDURE — 36415 COLL VENOUS BLD VENIPUNCTURE: CPT | Mod: ZL | Performed by: NURSE PRACTITIONER

## 2022-03-24 PROCEDURE — 93005 ELECTROCARDIOGRAM TRACING: CPT

## 2022-03-24 PROCEDURE — 80048 BASIC METABOLIC PNL TOTAL CA: CPT | Mod: ZL | Performed by: NURSE PRACTITIONER

## 2022-03-24 PROCEDURE — 93010 ELECTROCARDIOGRAM REPORT: CPT | Mod: 77 | Performed by: INTERNAL MEDICINE

## 2022-03-24 PROCEDURE — 85025 COMPLETE CBC W/AUTO DIFF WBC: CPT | Mod: ZL | Performed by: NURSE PRACTITIONER

## 2022-03-24 PROCEDURE — 99214 OFFICE O/P EST MOD 30 MIN: CPT | Performed by: NURSE PRACTITIONER

## 2022-03-24 ASSESSMENT — PAIN SCALES - GENERAL: PAINLEVEL: MILD PAIN (3)

## 2022-03-24 NOTE — PATIENT INSTRUCTIONS
Assessment & Plan     The proposed surgical procedure is considered INTERMEDIATE risk.    1. Pre-operative examination  Exam completred   - Basic metabolic panel; Future  - CBC with Platelets & Differential; Future  - EKG 12-lead complete w/read - (Clinic Performed)    2. Primary osteoarthritis of right hip  Wheeled walker with seat to [facility] [employer]   - Miscellaneous Order for DME - ONLY FOR DME    3. Hypertensive heart disease with chronic diastolic congestive heart failure (H)    4. RBBB (right bundle branch block)    5. Stage 3a chronic kidney disease (H)    6. RUMA (generalized anxiety disorder)    7. Unsteady gait  - Miscellaneous Order for DME - ONLY FOR DME

## 2022-03-24 NOTE — NURSING NOTE
"Chief Complaint   Patient presents with     Pre-Op Exam       Initial BP (!) 156/72 (BP Location: Left arm, Patient Position: Sitting, Cuff Size: Infant)   Pulse 60   Temp 97.5  F (36.4  C) (Tympanic)   Resp 24   Ht 1.753 m (5' 9\")   Wt 81.6 kg (180 lb)   SpO2 100%   BMI 26.58 kg/m   Estimated body mass index is 26.58 kg/m  as calculated from the following:    Height as of this encounter: 1.753 m (5' 9\").    Weight as of this encounter: 81.6 kg (180 lb).  Medication Reconciliation: complete  Barbara Urbina LPN    "

## 2022-06-20 ENCOUNTER — TELEPHONE (OUTPATIENT)
Dept: FAMILY MEDICINE | Facility: OTHER | Age: 87
End: 2022-06-20
Payer: MEDICARE

## 2022-06-20 NOTE — TELEPHONE ENCOUNTER
2:10 PM    Reason for Call: Phone Call    Description: pt states [facility] [place] will be sending a form or calling to have NP Marlon Negrete will need to fill out. Pt states he gives permission for them to talk to [facility] regarding briefs he needs. Please call pt if questions.    Was an appointment offered for this call? No  If yes : Appointment type              Date    Preferred method for responding to this message: Telephone Call  What is your phone number ? [phone]    If we cannot reach you directly, may we leave a detailed response at the number you provided? Yes    Can this message wait until your PCP/provider returns, if available today? Doretha Townsend

## 2022-06-24 NOTE — CONFIDENTIAL NOTE
Per pt stated he received a call from us, states unsure of who it was . With that said he wishes for us not to fill out that paperwork for [employer] for approval until further notice. Pt has an upcoming appt with Marlon on 7/14/22.  Thank you

## 2022-06-30 DIAGNOSIS — L71.9 ROSACEA: Primary | ICD-10-CM

## 2022-06-30 RX ORDER — DOXYCYCLINE 100 MG/1
100 CAPSULE ORAL 2 TIMES DAILY
Qty: 60 CAPSULE | Refills: 0 | Status: SHIPPED | OUTPATIENT
Start: 2022-06-30 | End: 2022-09-16

## 2022-06-30 NOTE — TELEPHONE ENCOUNTER
Pt calling and his dermatolgist Dayanna. is no longer at the clinic.    He needs Doxyclycine and Metrocream.He read back bottle to this writer.    He is not sure who he will see but does not want to drive to [place].He states there will be another Derm MD at a later date at [facility].    Pended.Please advise    Linda Mar RN

## 2022-07-08 NOTE — PROGRESS NOTES
"  Assessment & Plan     1. Hypertensive heart disease with chronic diastolic congestive heart failure (H)  Continue lisinopril 40mg daily  Continue amlodipine 5mg dily  Decrease atenolol 25mg daily   - Lipid Profile; Future  - Basic metabolic panel; Future  - TSH with free T4 reflex; Future    2. Stage 3a chronic kidney disease (H)  - Albumin Random Urine Quantitative with Creat Ratio; Future    3. RUMA (generalized anxiety disorder)  Ativan as needed    5. Unsteady gait  4 wheeled walker with seat.  - Miscellaneous Order for DME - ONLY FOR DME    6. Generalized muscle weakness  - Miscellaneous Order for DME - ONLY FOR DME       BMI:   Estimated body mass index is 27.6 kg/m  as calculated from the following:    Height as of this encounter: 1.753 m (5' 9\").    Weight as of this encounter: 84.8 kg (186 lb 14.4 oz).         Return in about 1 month (around 8/14/2022) for hypertension.    Eunice Dias NP  [facility] is a 87 year old, presenting for the following health issues:  Lipids, Heart Failure, and chronic kidney disease      HPI     Hypertension Follow-up      Do you check your blood pressure regularly outside of the clinic? Yes     Are you following a low salt diet? Yes    Are your blood pressures ever more than 140 on the top number (systolic) OR more   than 90 on the bottom number (diastolic), for example 140/90? No    Heart Failure Follow-up    Are you experiencing any shortness of breath? No    Are you experiencing any swelling in your legs or feet?  No    Are you using more pillows than usual? No    Do you cough at night?  No    Do you check your weight daily?  No    Have you had a weight change recently?  No    Are you having any of the following side effects from your medications? (Select all that apply)  Other:  light headed at times     Since your last visit, how many times have you gone to the cardiologist, urgent care, emergency room, or hospital " "because of your heart failure?   None    Chronic Kidney Disease Follow-up    Do you take any over the counter pain medicine?: Yes  What over the counter medicine are you taking for your pain?:  Tylenol       How often do you take this medicine?:  A few times a month      How many servings of fruits and vegetables do you eat daily?  2-3    On average, how many sweetened beverages do you drink each day (Examples: soda, juice, sweet tea, etc.  Do NOT count diet or artificially sweetened beverages)?   0    How many days per week do you exercise enough to make your heart beat faster? 3 or less    How many minutes a day do you exercise enough to make your heart beat faster? 9 or less    How many days per week do you miss taking your medication? 0    Aron has been increasingly unsteady of his feet.  He would benefit from a 4 wheeled walker with seat to assist with balance and reduce risk of falls.      Recent Labs   Lab Test 03/24/22  1417 10/14/21  1036 05/17/21  0925 02/15/21  1022   LDL  --  120* 89 101*   HDL  --  42 37* 44   TRIG  --  121 94 128   ALT  --   --  17 14   CR 1.30* 1.35* 1.35* 1.35*   GFRESTIMATED 53* 47* 47* 47*   GFRESTBLACK  --   --  54* 55*   POTASSIUM 4.3 4.4 4.3 4.3   TSH  --  3.46 4.14* 4.04*      BP Readings from Last 3 Encounters:   07/14/22 120/50   03/24/22 136/66   01/26/22 (!) 140/80    Wt Readings from Last 3 Encounters:   07/14/22 84.8 kg (186 lb 14.4 oz)   03/24/22 81.6 kg (180 lb)   01/26/22 81.6 kg (180 lb)                      Review of Systems   Constitutional, HEENT, cardiovascular, pulmonary, gi and gu systems are negative, except as otherwise noted.      Objective    /50 (BP Location: Right arm, Patient Position: Chair, Cuff Size: Adult Regular)   Pulse 63   Temp 98.5  F (36.9  C) (Tympanic)   Resp 18   Ht 1.753 m (5' 9\")   Wt 84.8 kg (186 lb 14.4 oz)   SpO2 98%   BMI 27.60 kg/m    Body mass index is 27.6 kg/m .  Physical Exam   GENERAL: alert, no distress very pleasant " elderly male  NECK: no adenopathy, no asymmetry, masses, or scars, thyroid normal to palpation and no carotid bruits  RESP: lungs clear to auscultation - no rales, rhonchi or wheezes  CV: regular rate and rhythm, normal S1 S2, no S3 or S4, no murmur, click or rub, no peripheral edema and peripheral pulses strong  MS: unsteady with rising from seated positive, holding onto clinic walls with ambulation to lab area.   PSYCH: mentation appears normal, affect normal/bright                    .  ..

## 2022-07-14 ENCOUNTER — OFFICE VISIT (OUTPATIENT)
Dept: FAMILY MEDICINE | Facility: OTHER | Age: 87
End: 2022-07-14
Attending: NURSE PRACTITIONER
Payer: MEDICARE

## 2022-07-14 VITALS
DIASTOLIC BLOOD PRESSURE: 50 MMHG | WEIGHT: 186.9 LBS | SYSTOLIC BLOOD PRESSURE: 120 MMHG | HEART RATE: 63 BPM | OXYGEN SATURATION: 98 % | BODY MASS INDEX: 27.68 KG/M2 | TEMPERATURE: 98.5 F | RESPIRATION RATE: 18 BRPM | HEIGHT: 69 IN

## 2022-07-14 DIAGNOSIS — F41.1 GAD (GENERALIZED ANXIETY DISORDER): ICD-10-CM

## 2022-07-14 DIAGNOSIS — I50.32 HYPERTENSIVE HEART DISEASE WITH CHRONIC DIASTOLIC CONGESTIVE HEART FAILURE (H): Primary | ICD-10-CM

## 2022-07-14 DIAGNOSIS — R26.81 UNSTEADY GAIT: ICD-10-CM

## 2022-07-14 DIAGNOSIS — I10 ESSENTIAL HYPERTENSION, BENIGN: ICD-10-CM

## 2022-07-14 DIAGNOSIS — N18.31 STAGE 3A CHRONIC KIDNEY DISEASE (H): ICD-10-CM

## 2022-07-14 DIAGNOSIS — I11.0 HYPERTENSIVE HEART DISEASE WITH CHRONIC DIASTOLIC CONGESTIVE HEART FAILURE (H): Primary | ICD-10-CM

## 2022-07-14 DIAGNOSIS — M62.81 GENERALIZED MUSCLE WEAKNESS: ICD-10-CM

## 2022-07-14 LAB
ANION GAP SERPL CALCULATED.3IONS-SCNC: 7 MMOL/L (ref 3–14)
BUN SERPL-MCNC: 29 MG/DL (ref 7–30)
CALCIUM SERPL-MCNC: 8.8 MG/DL (ref 8.5–10.1)
CHLORIDE BLD-SCNC: 108 MMOL/L (ref 94–109)
CHOLEST SERPL-MCNC: 162 MG/DL
CO2 SERPL-SCNC: 25 MMOL/L (ref 20–32)
CREAT SERPL-MCNC: 1.43 MG/DL (ref 0.66–1.25)
CREAT UR-MCNC: 169 MG/DL
FASTING STATUS PATIENT QL REPORTED: YES
GFR SERPL CREATININE-BSD FRML MDRD: 47 ML/MIN/1.73M2
GLUCOSE BLD-MCNC: 123 MG/DL (ref 70–99)
HDLC SERPL-MCNC: 36 MG/DL
LDLC SERPL CALC-MCNC: 88 MG/DL
MICROALBUMIN UR-MCNC: 107 MG/L
MICROALBUMIN/CREAT UR: 63.31 MG/G CR (ref 0–17)
NONHDLC SERPL-MCNC: 126 MG/DL
POTASSIUM BLD-SCNC: 4.3 MMOL/L (ref 3.4–5.3)
SODIUM SERPL-SCNC: 140 MMOL/L (ref 133–144)
TRIGL SERPL-MCNC: 191 MG/DL
TSH SERPL DL<=0.005 MIU/L-ACNC: 3.75 MU/L (ref 0.4–4)

## 2022-07-14 PROCEDURE — G0463 HOSPITAL OUTPT CLINIC VISIT: HCPCS

## 2022-07-14 PROCEDURE — 80048 BASIC METABOLIC PNL TOTAL CA: CPT | Mod: ZL | Performed by: NURSE PRACTITIONER

## 2022-07-14 PROCEDURE — 84443 ASSAY THYROID STIM HORMONE: CPT | Mod: ZL | Performed by: NURSE PRACTITIONER

## 2022-07-14 PROCEDURE — 80061 LIPID PANEL: CPT | Mod: ZL | Performed by: NURSE PRACTITIONER

## 2022-07-14 PROCEDURE — 82043 UR ALBUMIN QUANTITATIVE: CPT | Mod: ZL | Performed by: NURSE PRACTITIONER

## 2022-07-14 PROCEDURE — 36415 COLL VENOUS BLD VENIPUNCTURE: CPT | Mod: ZL | Performed by: NURSE PRACTITIONER

## 2022-07-14 PROCEDURE — 99214 OFFICE O/P EST MOD 30 MIN: CPT | Performed by: NURSE PRACTITIONER

## 2022-07-14 RX ORDER — ATENOLOL 25 MG/1
50 TABLET ORAL DAILY
Qty: 90 TABLET | Refills: 1 | COMMUNITY
Start: 2022-07-14 | End: 2022-08-12 | Stop reason: DRUGHIGH

## 2022-07-14 ASSESSMENT — PAIN SCALES - GENERAL: PAINLEVEL: NO PAIN (0)

## 2022-07-14 NOTE — NURSING NOTE
"Chief Complaint   Patient presents with     Lipids     Heart Failure     chronic kidney disease       Initial /50 (BP Location: Right arm, Patient Position: Chair, Cuff Size: Adult Regular)   Pulse 63   Temp 98.5  F (36.9  C) (Tympanic)   Resp 18   Ht 1.753 m (5' 9\")   Wt 84.8 kg (186 lb 14.4 oz)   SpO2 98%   BMI 27.60 kg/m   Estimated body mass index is 27.6 kg/m  as calculated from the following:    Height as of this encounter: 1.753 m (5' 9\").    Weight as of this encounter: 84.8 kg (186 lb 14.4 oz).  Medication Reconciliation: complete  Pamela M. Lechevalier, LPN    "

## 2022-07-14 NOTE — PATIENT INSTRUCTIONS
"  Assessment & Plan     1. Hypertensive heart disease with chronic diastolic congestive heart failure (H)  Continue lisinopril 40mg daily  Continue amlodipine 5mg dily  Decrease atenolol 25mg daily   - Lipid Profile; Future  - Basic metabolic panel; Future  - TSH with free T4 reflex; Future    2. Stage 3a chronic kidney disease (H)  - Albumin Random Urine Quantitative with Creat Ratio; Future    3. RUMA (generalized anxiety disorder)  Ativan as needed         BMI:   Estimated body mass index is 27.6 kg/m  as calculated from the following:    Height as of this encounter: 1.753 m (5' 9\").    Weight as of this encounter: 84.8 kg (186 lb 14.4 oz).         Return in about 1 month (around 8/14/2022) for hypertension.    Eunice Dias NP  [facility]  "

## 2022-07-22 DIAGNOSIS — I10 ESSENTIAL HYPERTENSION, BENIGN: ICD-10-CM

## 2022-07-25 ENCOUNTER — TELEPHONE (OUTPATIENT)
Dept: FAMILY MEDICINE | Facility: OTHER | Age: 87
End: 2022-07-25

## 2022-07-25 RX ORDER — LISINOPRIL 40 MG/1
TABLET ORAL
Qty: 90 TABLET | Refills: 3 | Status: SHIPPED | OUTPATIENT
Start: 2022-07-25 | End: 2023-07-18

## 2022-07-25 NOTE — TELEPHONE ENCOUNTER
Patient called back again.  States that he thinks that his med being decreased has increased his BP.  He is wondering if he should increase back to one tab instead of a half tablet.  Patient has called 3 times today and would like an answer before the day is over.  Patient denies any symptoms.    Patient states he is getting anxious because of this and asked if he could take an ativan.  Advised that it would be fine for him to take an Ativan to help with his worries.

## 2022-07-25 NOTE — TELEPHONE ENCOUNTER
Patient called today and noted increase in bp today 164/83 last week was also elevated. Since he decreased of atenolol to 25 mg please advise.

## 2022-07-25 NOTE — TELEPHONE ENCOUNTER
Lisinopril       Last Written Prescription Date:  5/21/21  Last Fill Quantity: 90,   # refills: 3  Last Office Visit: 7/14/22  Future Office visit:    Next 5 appointments (look out 90 days)    Aug 12, 2022  2:15 PM  (Arrive by 2:00 PM)  SHORT with Eunice Dias NP  [facility] ([facility] ) [address]  [facility] [address]  [phone]

## 2022-07-25 NOTE — TELEPHONE ENCOUNTER
Talked to patient and notified can increase bp medication to 50 mg daily will call back next week with bp readings lowest it has been 151/79 165/78 before that and 150/87 164/83 175/83. Onset was last Thursday 7/21/22.

## 2022-08-01 ENCOUNTER — TELEPHONE (OUTPATIENT)
Dept: FAMILY MEDICINE | Facility: OTHER | Age: 87
End: 2022-08-01

## 2022-08-11 NOTE — PROGRESS NOTES
"  Assessment & Plan     1. Hypertensive heart disease with chronic diastolic congestive heart failure (H)  Well controlled - continue amlodipine 5mg, lisinopril 40mg and atenolol 50mg daily  Continue home monitoring        BMI:   Estimated body mass index is 27.47 kg/m  as calculated from the following:    Height as of this encounter: 1.753 m (5' 9\").    Weight as of this encounter: 84.4 kg (186 lb).       Follow-up in 6 months or as needed     Eunice Dias NP  [facility] - [place] SHEFFIELD is a 87 year old, presenting for the following health issues:  Hypertension      HPI     Hypertension Follow-up one month       Do you check your blood pressure regularly outside of the clinic? Yes     Are you following a low salt diet? Yes    Are your blood pressures ever more than 140 on the top number (systolic) OR more   than 90 on the bottom number (diastolic), for example 140/90? No    Last visit, BP was on the low side so we decreased atenolol to 25mg daily, BP shot up and was above target.  We increased back up to 50mg and is doing much better, he is feeling well.  He continues taking amlodipine and lisinopril.          Recent Labs   Lab Test 07/14/22  1502 03/24/22  1417 10/14/21  1036 10/14/21  1036 05/17/21  0925 02/15/21  1022   LDL 88  --   --  120* 89 101*   HDL 36*  --   --  42 37* 44   TRIG 191*  --   --  121 94 128   ALT  --   --   --   --  17 14   CR 1.43* 1.30*   < > 1.35* 1.35* 1.35*   GFRESTIMATED 47* 53*   < > 47* 47* 47*   GFRESTBLACK  --   --   --   --  54* 55*   POTASSIUM 4.3 4.3   < > 4.4 4.3 4.3   TSH 3.75  --   --  3.46 4.14* 4.04*    < > = values in this interval not displayed.      BP Readings from Last 3 Encounters:   08/12/22 138/72   07/14/22 120/50   03/24/22 136/66    Wt Readings from Last 3 Encounters:   08/12/22 84.4 kg (186 lb)   07/14/22 84.8 kg (186 lb 14.4 oz)   03/24/22 81.6 kg (180 lb)                      Review of Systems   Constitutional, HEENT, " "cardiovascular, pulmonary, gi and gu systems are negative, except as otherwise noted.      Objective    /72 (BP Location: Left arm, Patient Position: Chair, Cuff Size: Adult Regular)   Pulse 55   Temp 97.8  F (36.6  C) (Tympanic)   Resp 18   Ht 1.753 m (5' 9\")   Wt 84.4 kg (186 lb)   SpO2 98%   BMI 27.47 kg/m    Body mass index is 27.47 kg/m .  Physical Exam   GENERAL: alert, no distress very pleasant elderly male.   MS: no gross musculoskeletal defects noted, no edema  PSYCH: mentation appears normal, affect normal/bright                    .  ..  "

## 2022-08-12 ENCOUNTER — OFFICE VISIT (OUTPATIENT)
Dept: FAMILY MEDICINE | Facility: OTHER | Age: 87
End: 2022-08-12
Attending: NURSE PRACTITIONER
Payer: MEDICARE

## 2022-08-12 VITALS
HEART RATE: 55 BPM | WEIGHT: 186 LBS | BODY MASS INDEX: 27.55 KG/M2 | TEMPERATURE: 97.8 F | SYSTOLIC BLOOD PRESSURE: 138 MMHG | HEIGHT: 69 IN | OXYGEN SATURATION: 98 % | RESPIRATION RATE: 18 BRPM | DIASTOLIC BLOOD PRESSURE: 72 MMHG

## 2022-08-12 DIAGNOSIS — I11.0 HYPERTENSIVE HEART DISEASE WITH CHRONIC DIASTOLIC CONGESTIVE HEART FAILURE (H): Primary | ICD-10-CM

## 2022-08-12 DIAGNOSIS — I50.32 HYPERTENSIVE HEART DISEASE WITH CHRONIC DIASTOLIC CONGESTIVE HEART FAILURE (H): Primary | ICD-10-CM

## 2022-08-12 PROCEDURE — G0463 HOSPITAL OUTPT CLINIC VISIT: HCPCS

## 2022-08-12 PROCEDURE — 99213 OFFICE O/P EST LOW 20 MIN: CPT | Performed by: NURSE PRACTITIONER

## 2022-08-12 RX ORDER — ATENOLOL 50 MG/1
50 TABLET ORAL DAILY
COMMUNITY
End: 2022-10-31

## 2022-08-12 ASSESSMENT — PAIN SCALES - GENERAL: PAINLEVEL: NO PAIN (0)

## 2022-08-12 NOTE — NURSING NOTE
"Chief Complaint   Patient presents with     Hypertension       Initial /72 (BP Location: Left arm, Patient Position: Chair, Cuff Size: Adult Regular)   Pulse 55   Temp 97.8  F (36.6  C) (Tympanic)   Resp 18   Ht 1.753 m (5' 9\")   Wt 84.4 kg (186 lb)   SpO2 98%   BMI 27.47 kg/m   Estimated body mass index is 27.47 kg/m  as calculated from the following:    Height as of this encounter: 1.753 m (5' 9\").    Weight as of this encounter: 84.4 kg (186 lb).  Medication Reconciliation: complete  Pamela M. Lechevalier, LPN    "

## 2022-09-16 ENCOUNTER — TELEPHONE (OUTPATIENT)
Dept: FAMILY MEDICINE | Facility: OTHER | Age: 87
End: 2022-09-16

## 2022-09-16 DIAGNOSIS — L71.9 ROSACEA: ICD-10-CM

## 2022-09-16 RX ORDER — DOXYCYCLINE 100 MG/1
CAPSULE ORAL
Qty: 60 CAPSULE | Refills: 11 | Status: SHIPPED | OUTPATIENT
Start: 2022-09-16 | End: 2023-10-30

## 2022-09-19 DIAGNOSIS — L71.9 ROSACEA: ICD-10-CM

## 2022-09-19 NOTE — TELEPHONE ENCOUNTER
Metronidazole cream      Last Written Prescription Date:  6/30/22  Last Fill Quantity: 45 g,   # refills: 0  Last Office Visit: 8/12/22  Future Office visit:       Routing refill request to provider for review/approval because:

## 2022-09-30 ENCOUNTER — OFFICE VISIT (OUTPATIENT)
Dept: FAMILY MEDICINE | Facility: OTHER | Age: 87
End: 2022-09-30
Attending: NURSE PRACTITIONER
Payer: MEDICARE

## 2022-09-30 ENCOUNTER — ANCILLARY PROCEDURE (OUTPATIENT)
Dept: GENERAL RADIOLOGY | Facility: OTHER | Age: 87
End: 2022-09-30
Attending: NURSE PRACTITIONER
Payer: MEDICARE

## 2022-09-30 VITALS
TEMPERATURE: 97.9 F | RESPIRATION RATE: 18 BRPM | BODY MASS INDEX: 28.16 KG/M2 | HEART RATE: 62 BPM | WEIGHT: 190.7 LBS | OXYGEN SATURATION: 100 % | DIASTOLIC BLOOD PRESSURE: 72 MMHG | SYSTOLIC BLOOD PRESSURE: 132 MMHG

## 2022-09-30 DIAGNOSIS — M79.89 SWELLING OF LEFT FOOT: ICD-10-CM

## 2022-09-30 DIAGNOSIS — M79.672 LEFT FOOT PAIN: ICD-10-CM

## 2022-09-30 DIAGNOSIS — E53.8 VITAMIN B12 DEFICIENCY (NON ANEMIC): ICD-10-CM

## 2022-09-30 DIAGNOSIS — L03.116 CELLULITIS OF LEFT LOWER EXTREMITY: Primary | ICD-10-CM

## 2022-09-30 LAB
BASOPHILS # BLD AUTO: 0 10E3/UL (ref 0–0.2)
BASOPHILS NFR BLD AUTO: 0 %
EOSINOPHIL # BLD AUTO: 0.1 10E3/UL (ref 0–0.7)
EOSINOPHIL NFR BLD AUTO: 1 %
ERYTHROCYTE [DISTWIDTH] IN BLOOD BY AUTOMATED COUNT: 14.4 % (ref 10–15)
HCT VFR BLD AUTO: 43.2 % (ref 40–53)
HGB BLD-MCNC: 14.6 G/DL (ref 13.3–17.7)
LYMPHOCYTES # BLD AUTO: 1.3 10E3/UL (ref 0.8–5.3)
LYMPHOCYTES NFR BLD AUTO: 17 %
MCH RBC QN AUTO: 31.1 PG (ref 26.5–33)
MCHC RBC AUTO-ENTMCNC: 33.8 G/DL (ref 31.5–36.5)
MCV RBC AUTO: 92 FL (ref 78–100)
MONOCYTES # BLD AUTO: 0.7 10E3/UL (ref 0–1.3)
MONOCYTES NFR BLD AUTO: 9 %
NEUTROPHILS # BLD AUTO: 5.3 10E3/UL (ref 1.6–8.3)
NEUTROPHILS NFR BLD AUTO: 72 %
PLATELET # BLD AUTO: 184 10E3/UL (ref 150–450)
RBC # BLD AUTO: 4.69 10E6/UL (ref 4.4–5.9)
URATE SERPL-MCNC: 6.1 MG/DL (ref 3.5–7.2)
VIT B12 SERPL-MCNC: 630 PG/ML (ref 232–1245)
WBC # BLD AUTO: 7.4 10E3/UL (ref 4–11)

## 2022-09-30 PROCEDURE — 36415 COLL VENOUS BLD VENIPUNCTURE: CPT | Mod: ZL | Performed by: NURSE PRACTITIONER

## 2022-09-30 PROCEDURE — G0463 HOSPITAL OUTPT CLINIC VISIT: HCPCS

## 2022-09-30 PROCEDURE — 99214 OFFICE O/P EST MOD 30 MIN: CPT | Performed by: NURSE PRACTITIONER

## 2022-09-30 PROCEDURE — 73630 X-RAY EXAM OF FOOT: CPT | Mod: TC,LT,FY

## 2022-09-30 PROCEDURE — 85025 COMPLETE CBC W/AUTO DIFF WBC: CPT | Mod: ZL | Performed by: NURSE PRACTITIONER

## 2022-09-30 PROCEDURE — 84550 ASSAY OF BLOOD/URIC ACID: CPT | Mod: ZL | Performed by: NURSE PRACTITIONER

## 2022-09-30 PROCEDURE — 82607 VITAMIN B-12: CPT | Mod: ZL | Performed by: NURSE PRACTITIONER

## 2022-09-30 RX ORDER — DOXYCYCLINE HYCLATE 100 MG
100 TABLET ORAL 2 TIMES DAILY
Qty: 20 TABLET | Refills: 0 | Status: SHIPPED | OUTPATIENT
Start: 2022-09-30 | End: 2022-10-10

## 2022-09-30 RX ORDER — PREDNISONE 20 MG/1
20 TABLET ORAL DAILY
Qty: 5 TABLET | Refills: 0 | Status: SHIPPED | OUTPATIENT
Start: 2022-09-30 | End: 2022-10-05

## 2022-09-30 ASSESSMENT — PAIN SCALES - GENERAL: PAINLEVEL: MODERATE PAIN (5)

## 2022-09-30 NOTE — PROGRESS NOTES
"  Assessment & Plan     1. Cellulitis of left lower extremity  Ice, elevate  - doxycycline hyclate (VIBRA-TABS) 100 MG tablet; Take 1 tablet (100 mg) by mouth 2 times daily for 10 days  Dispense: 20 tablet; Refill: 0  - predniSONE (DELTASONE) 20 MG tablet; Take 1 tablet (20 mg) by mouth daily for 5 days  Dispense: 5 tablet; Refill: 0    2. Left foot pain  - CBC with platelets and differential  - Uric acid  - predniSONE (DELTASONE) 20 MG tablet; Take 1 tablet (20 mg) by mouth daily for 5 days  Dispense: 5 tablet; Refill: 0    3. Swelling of left foot  - XR FOOT LT G/E 3 VW (Clinic Performed); Future  - CBC with platelets and differential  - Uric acid  - predniSONE (DELTASONE) 20 MG tablet; Take 1 tablet (20 mg) by mouth daily for 5 days  Dispense: 5 tablet; Refill: 0    4. Vitamin B12 deficiency (non anemic)  - Vitamin B12         BMI:   Estimated body mass index is 28.16 kg/m  as calculated from the following:    Height as of 8/12/22: 1.753 m (5' 9\").    Weight as of this encounter: 86.5 kg (190 lb 11.2 oz).     Follow-up if no improvement or if any worsening over the weekend to ED/UC    Eunice Dias NP  [facility] - [place] SHEFFIELD is a 87 year old, presenting for the following health issues:  Arthritis      HPI     Gout/ Single Inflamed Joint  Onset/Duration: within a week  Description:   Location: foot - left  Joint Swelling: YES  Redness: mild  Pain: YES  Intensity: moderate  Progression of Symptoms: same  Accompanying Signs & Symptoms:  Fevers: No  History:   Trauma to the area: No  Previous history of gout: No  Recent illness: No  Alcohol use: No  Diuretic use: No  Precipitating or alleviating factors: worse by walking  Therapies tried and outcome: Elevation helps. Tylenol with no help    Due for B12 level today.     Review of Systems   Constitutional, HEENT, cardiovascular, pulmonary, gi and gu systems are negative, except as otherwise noted.      Objective    /72 " (BP Location: Left arm, Patient Position: Chair, Cuff Size: Adult Regular)   Pulse 62   Temp 97.9  F (36.6  C) (Tympanic)   Resp 18   Wt 86.5 kg (190 lb 11.2 oz)   SpO2 100%   BMI 28.16 kg/m    Body mass index is 28.16 kg/m .  Physical Exam   GENERAL: healthy, alert and no distress  MS: left foot swollen, erythematous and warm when compared to right foot.  Notes tenderness of foot with walking and sitting but no increased tenderness with palpation of his foot.  Pulses intact.  Sensation intact.   PSYCH: mentation appears normal, affect normal/bright        Results for orders placed or performed in visit on 09/30/22   XR FOOT LT G/E 3 VW (Clinic Performed)     Status: None    Narrative    PROCEDURE:  XR FOOT LEFT G/E 3 VIEWS    HISTORY: Left foot pain; Swelling of left foot    COMPARISON:  None.    TECHNIQUE:  3 views of the left foot were obtained.    FINDINGS:  No fracture or dislocation is identified. Degenerative  arthritic changes are seen at the distal interphalangeal joints of the  third and fourth toes. The remainder of the joint spaces are  preserved.        Impression    IMPRESSION: No acute foot abnormality.    MANOLO HAIR MD         SYSTEM ID:  [id number]   Results for orders placed or performed in visit on 09/30/22   CBC with platelets and differential     Status: None   Result Value Ref Range    WBC Count 7.4 4.0 - 11.0 10e3/uL    RBC Count 4.69 4.40 - 5.90 10e6/uL    Hemoglobin 14.6 13.3 - 17.7 g/dL    Hematocrit 43.2 40.0 - 53.0 %    MCV 92 78 - 100 fL    MCH 31.1 26.5 - 33.0 pg    MCHC 33.8 31.5 - 36.5 g/dL    RDW 14.4 10.0 - 15.0 %    Platelet Count 184 150 - 450 10e3/uL    % Neutrophils 72 %    % Lymphocytes 17 %    % Monocytes 9 %    % Eosinophils 1 %    % Basophils 0 %    Absolute Neutrophils 5.3 1.6 - 8.3 10e3/uL    Absolute Lymphocytes 1.3 0.8 - 5.3 10e3/uL    Absolute Monocytes 0.7 0.0 - 1.3 10e3/uL    Absolute Eosinophils 0.1 0.0 - 0.7 10e3/uL    Absolute Basophils 0.0 0.0 - 0.2  10e3/uL   CBC with platelets and differential     Status: None    Narrative    The following orders were created for panel order CBC with platelets and differential.  Procedure                               Abnormality         Status                     ---------                               -----------         ------                     CBC with platelets and d...[[id number]]                      Final result                 Please view results for these tests on the individual orders.

## 2022-09-30 NOTE — PATIENT INSTRUCTIONS
"  Assessment & Plan     1. Cellulitis of left lower extremity  Ice, elevate  - doxycycline hyclate (VIBRA-TABS) 100 MG tablet; Take 1 tablet (100 mg) by mouth 2 times daily for 10 days  Dispense: 20 tablet; Refill: 0  - predniSONE (DELTASONE) 20 MG tablet; Take 1 tablet (20 mg) by mouth daily for 5 days  Dispense: 5 tablet; Refill: 0    2. Left foot pain  - CBC with platelets and differential  - Uric acid  - predniSONE (DELTASONE) 20 MG tablet; Take 1 tablet (20 mg) by mouth daily for 5 days  Dispense: 5 tablet; Refill: 0    3. Swelling of left foot  - XR FOOT LT G/E 3 VW (Clinic Performed); Future  - CBC with platelets and differential  - Uric acid  - predniSONE (DELTASONE) 20 MG tablet; Take 1 tablet (20 mg) by mouth daily for 5 days  Dispense: 5 tablet; Refill: 0    4. Vitamin B12 deficiency (non anemic)  - Vitamin B12         BMI:   Estimated body mass index is 28.16 kg/m  as calculated from the following:    Height as of 8/12/22: 1.753 m (5' 9\").    Weight as of this encounter: 86.5 kg (190 lb 11.2 oz).     Follow-up if no improvement or if any worsening over the weekend to ED/UC    Eunice Dias NP  [facility] - [facility]  "

## 2022-09-30 NOTE — NURSING NOTE
"Chief Complaint   Patient presents with     Arthritis       Initial /72 (BP Location: Left arm, Patient Position: Chair, Cuff Size: Adult Regular)   Pulse 62   Temp 97.9  F (36.6  C) (Tympanic)   Resp 18   Wt 86.5 kg (190 lb 11.2 oz)   SpO2 100%   BMI 28.16 kg/m   Estimated body mass index is 28.16 kg/m  as calculated from the following:    Height as of 8/12/22: 1.753 m (5' 9\").    Weight as of this encounter: 86.5 kg (190 lb 11.2 oz).  Medication Reconciliation: complete  Marilynn Cooper    "

## 2022-10-27 DIAGNOSIS — L71.9 ROSACEA: Primary | ICD-10-CM

## 2022-10-27 RX ORDER — TRETINOIN 0.1 MG/G
GEL TOPICAL AT BEDTIME
Qty: 135 G | Refills: 1 | Status: SHIPPED | OUTPATIENT
Start: 2022-10-27 | End: 2024-08-19

## 2022-10-28 DIAGNOSIS — I50.32 HYPERTENSIVE HEART DISEASE WITH CHRONIC DIASTOLIC CONGESTIVE HEART FAILURE (H): ICD-10-CM

## 2022-10-28 DIAGNOSIS — I11.0 HYPERTENSIVE HEART DISEASE WITH CHRONIC DIASTOLIC CONGESTIVE HEART FAILURE (H): ICD-10-CM

## 2022-10-31 RX ORDER — ATENOLOL 50 MG/1
TABLET ORAL
Qty: 90 TABLET | Refills: 3 | Status: SHIPPED | OUTPATIENT
Start: 2022-10-31 | End: 2023-10-27

## 2022-10-31 RX ORDER — AMLODIPINE BESYLATE 5 MG/1
TABLET ORAL
Qty: 90 TABLET | Refills: 3 | Status: SHIPPED | OUTPATIENT
Start: 2022-10-31 | End: 2023-10-27

## 2022-10-31 NOTE — TELEPHONE ENCOUNTER
norvasc      Last Written Prescription Date:  11/15/21  Last Fill Quantity: 90,   # refills: 3  Last Office Visit: 9/30/22  Future Office visit:       atenolol      Last Written Prescription Date:  historical  Last Fill Quantity: ,   # refills:   Last Office Visit: 9/30/22  Future Office visit:

## 2022-11-03 DIAGNOSIS — L71.9 ROSACEA: ICD-10-CM

## 2022-11-03 NOTE — TELEPHONE ENCOUNTER
Metronidazole cream 0.75% 45gm      Last Written Prescription Date:  9/19/22  Last Fill Quantity: 45g,   # refills: 0  Last Office Visit: 9/30/22  Future Office visit:

## 2022-11-21 DIAGNOSIS — F41.1 GENERALIZED ANXIETY DISORDER: ICD-10-CM

## 2022-11-21 RX ORDER — LORAZEPAM 0.5 MG/1
0.5 TABLET ORAL EVERY 6 HOURS PRN
Qty: 20 TABLET | Refills: 1 | Status: SHIPPED | OUTPATIENT
Start: 2022-11-21

## 2023-03-06 ENCOUNTER — OFFICE VISIT (OUTPATIENT)
Dept: FAMILY MEDICINE | Facility: OTHER | Age: 88
End: 2023-03-06
Attending: NURSE PRACTITIONER
Payer: MEDICARE

## 2023-03-06 VITALS
RESPIRATION RATE: 18 BRPM | DIASTOLIC BLOOD PRESSURE: 68 MMHG | BODY MASS INDEX: 28.27 KG/M2 | HEIGHT: 69 IN | TEMPERATURE: 97.6 F | WEIGHT: 190.9 LBS | OXYGEN SATURATION: 98 % | HEART RATE: 58 BPM | SYSTOLIC BLOOD PRESSURE: 128 MMHG

## 2023-03-06 DIAGNOSIS — E53.8 VITAMIN B12 DEFICIENCY (NON ANEMIC): ICD-10-CM

## 2023-03-06 DIAGNOSIS — L71.9 ROSACEA: ICD-10-CM

## 2023-03-06 DIAGNOSIS — F41.1 GENERALIZED ANXIETY DISORDER: ICD-10-CM

## 2023-03-06 DIAGNOSIS — I11.0 HYPERTENSIVE HEART DISEASE WITH CHRONIC DIASTOLIC CONGESTIVE HEART FAILURE (H): Primary | ICD-10-CM

## 2023-03-06 DIAGNOSIS — I50.32 HYPERTENSIVE HEART DISEASE WITH CHRONIC DIASTOLIC CONGESTIVE HEART FAILURE (H): Primary | ICD-10-CM

## 2023-03-06 DIAGNOSIS — N18.31 STAGE 3A CHRONIC KIDNEY DISEASE (H): ICD-10-CM

## 2023-03-06 PROCEDURE — 82607 VITAMIN B-12: CPT | Mod: ZL | Performed by: NURSE PRACTITIONER

## 2023-03-06 PROCEDURE — 84439 ASSAY OF FREE THYROXINE: CPT | Mod: ZL | Performed by: NURSE PRACTITIONER

## 2023-03-06 PROCEDURE — 36415 COLL VENOUS BLD VENIPUNCTURE: CPT | Mod: ZL | Performed by: NURSE PRACTITIONER

## 2023-03-06 PROCEDURE — 99214 OFFICE O/P EST MOD 30 MIN: CPT | Performed by: NURSE PRACTITIONER

## 2023-03-06 PROCEDURE — 84443 ASSAY THYROID STIM HORMONE: CPT | Mod: ZL | Performed by: NURSE PRACTITIONER

## 2023-03-06 PROCEDURE — 80053 COMPREHEN METABOLIC PANEL: CPT | Mod: ZL | Performed by: NURSE PRACTITIONER

## 2023-03-06 PROCEDURE — G0463 HOSPITAL OUTPT CLINIC VISIT: HCPCS

## 2023-03-06 PROCEDURE — 80061 LIPID PANEL: CPT | Mod: ZL | Performed by: NURSE PRACTITIONER

## 2023-03-06 ASSESSMENT — ANXIETY QUESTIONNAIRES
2. NOT BEING ABLE TO STOP OR CONTROL WORRYING: NOT AT ALL
4. TROUBLE RELAXING: NOT AT ALL
3. WORRYING TOO MUCH ABOUT DIFFERENT THINGS: NOT AT ALL
GAD7 TOTAL SCORE: 0
6. BECOMING EASILY ANNOYED OR IRRITABLE: NOT AT ALL
GAD7 TOTAL SCORE: 0
IF YOU CHECKED OFF ANY PROBLEMS ON THIS QUESTIONNAIRE, HOW DIFFICULT HAVE THESE PROBLEMS MADE IT FOR YOU TO DO YOUR WORK, TAKE CARE OF THINGS AT HOME, OR GET ALONG WITH OTHER PEOPLE: NOT DIFFICULT AT ALL
7. FEELING AFRAID AS IF SOMETHING AWFUL MIGHT HAPPEN: NOT AT ALL
5. BEING SO RESTLESS THAT IT IS HARD TO SIT STILL: NOT AT ALL
1. FEELING NERVOUS, ANXIOUS, OR ON EDGE: NOT AT ALL

## 2023-03-06 ASSESSMENT — PATIENT HEALTH QUESTIONNAIRE - PHQ9: SUM OF ALL RESPONSES TO PHQ QUESTIONS 1-9: 0

## 2023-03-06 ASSESSMENT — PAIN SCALES - GENERAL: PAINLEVEL: NO PAIN (0)

## 2023-03-06 NOTE — PATIENT INSTRUCTIONS
"  Assessment & Plan     1. Hypertensive heart disease with chronic diastolic congestive heart failure (H)  Continue current plan   - Lipid Profile; Future  - Comprehensive metabolic panel; Future  - TSH with free T4 reflex; Future    2. Stage 3a chronic kidney disease (H)  Do not use NSAIDS    3. Generalized anxiety disorder  Stable     4. Vitamin B12 deficiency (non anemic)  - Vitamin B12; Future    5. Rosacea  Continue doxycyline.   - metroNIDAZOLE (METROCREAM) 0.75 % external cream; Apply by topical route 2 time every day a thin layer to the affected areas in the morning and evening  Dispense: 45 g; Refill: 3       BMI:   Estimated body mass index is 28.19 kg/m  as calculated from the following:    Height as of this encounter: 1.753 m (5' 9\").    Weight as of this encounter: 86.6 kg (190 lb 14.4 oz).       Follow-up in 6 months or as needed     Eunice Dias, NP  [facility]  "

## 2023-03-06 NOTE — PROGRESS NOTES
"  Assessment & Plan     1. Hypertensive heart disease with chronic diastolic congestive heart failure (H)  Continue current plan   - Lipid Profile; Future  - Comprehensive metabolic panel; Future  - TSH with free T4 reflex; Future    2. Stage 3a chronic kidney disease (H)  Do not use NSAIDS    3. Generalized anxiety disorder  Stable     4. Vitamin B12 deficiency (non anemic)  - Vitamin B12; Future    5. Rosacea  Continue doxycyline.   - metroNIDAZOLE (METROCREAM) 0.75 % external cream; Apply by topical route 2 time every day a thin layer to the affected areas in the morning and evening  Dispense: 45 g; Refill: 3       BMI:   Estimated body mass index is 28.19 kg/m  as calculated from the following:    Height as of this encounter: 1.753 m (5' 9\").    Weight as of this encounter: 86.6 kg (190 lb 14.4 oz).       Follow-up in 6 months or as needed     Eunice Dias, NP  [facility] - [place] PH[place] Sharp is a 88 year old, presenting for the following health issues:  Lipids, Heart Failure, Anxiety, and chronic kidney disease       HPI     Hypertension Follow-up      Do you check your blood pressure regularly outside of the clinic? Yes     Are you following a low salt diet? Yes    Are your blood pressures ever more than 140 on the top number (systolic) OR more   than 90 on the bottom number (diastolic), for example 140/90? No    Home readings:    116/54  138/64  137/68  128/66  126/61       Hyperlipidemia Follow-Up      Are you regularly taking any medication or supplement to lower your cholesterol?   No    Are you having muscle aches or other side effects that you think could be caused by your cholesterol lowering medication?  No    Heart Failure Follow-up    Are you experiencing any shortness of breath? No    Are you experiencing any swelling in your legs or feet?  No    Are you using more pillows than usual? No    Do you cough at night?  No    Do you check your weight daily?  No    Have you " had a weight change recently?  No    Are you having any of the following side effects from your medications? (Select all that apply)  The patient does not report symptoms of dizziness, fatigue, cough, swelling, or slow heart beat.    Since your last visit, how many times have you gone to the cardiologist, urgent care, emergency room, or hospital because of your heart failure?   None    Anxiety Follow-Up    How are you doing with your anxiety since your last visit? Improved     Are you having other symptoms that might be associated with anxiety? No    Have you had a significant life event? No     Are you feeling depressed? No    Do you have any concerns with your use of alcohol or other drugs? No    Social History     Tobacco Use     Smoking status: Former     Packs/day: 0.00     Types: Cigarettes     Quit date:      Years since quittin.2     Smokeless tobacco: Former     Quit date: 2001   Vaping Use     Vaping Use: Never used   Substance Use Topics     Alcohol use: No     Drug use: No     RUMA-7 SCORE 2018 2018 3/6/2023   Total Score 0 0 0     PHQ 2018 2018 3/6/2023   PHQ-9 Total Score 0 2 0   Q9: Thoughts of better off dead/self-harm past 2 weeks Not at all Not at all Not at all     Last PHQ-9 3/6/2023   1.  Little interest or pleasure in doing things 0   2.  Feeling down, depressed, or hopeless 0   3.  Trouble falling or staying asleep, or sleeping too much 0   4.  Feeling tired or having little energy 0   5.  Poor appetite or overeating 0   6.  Feeling bad about yourself 0   7.  Trouble concentrating 0   8.  Moving slowly or restless 0   Q9: Thoughts of better off dead/self-harm past 2 weeks 0   PHQ-9 Total Score 0   Difficulty at work, home, or with people Not difficult at all     RUMA-7  3/6/2023   1. Feeling nervous, anxious, or on edge 0   2. Not being able to stop or control worrying 0   3. Worrying too much about different things 0   4. Trouble relaxing 0   5. Being so restless  "that it is hard to sit still 0   6. Becoming easily annoyed or irritable 0   7. Feeling afraid, as if something awful might happen 0   RUMA-7 Total Score 0   If you checked any problems, how difficult have they made it for you to do your work, take care of things at home, or get along with other people? Not difficult at all       Chronic Kidney Disease Follow-up    Do you take any over the counter pain medicine?: Yes  What over the counter medicine are you taking for your pain?:  Tylenol       How often do you take this medicine?:  A few times a month        He is feeling well, no new concerns.  He has started using his cane more often, especially in the winter months.  Encouraged him to continue use for safety.      Recent Labs   Lab Test 07/14/22  1502 03/24/22  1417 10/14/21  1036 10/14/21  1036 05/17/21  0925 02/15/21  1022   LDL 88  --   --  120* 89 101*   HDL 36*  --   --  42 37* 44   TRIG 191*  --   --  121 94 128   ALT  --   --   --   --  17 14   CR 1.43* 1.30*   < > 1.35* 1.35* 1.35*   GFRESTIMATED 47* 53*   < > 47* 47* 47*   GFRESTBLACK  --   --   --   --  54* 55*   POTASSIUM 4.3 4.3   < > 4.4 4.3 4.3   TSH 3.75  --   --  3.46 4.14* 4.04*    < > = values in this interval not displayed.      BP Readings from Last 3 Encounters:   03/06/23 128/68   09/30/22 132/72   08/12/22 138/72    Wt Readings from Last 3 Encounters:   03/06/23 86.6 kg (190 lb 14.4 oz)   09/30/22 86.5 kg (190 lb 11.2 oz)   08/12/22 84.4 kg (186 lb)                      Review of Systems   Constitutional, HEENT, cardiovascular, pulmonary, gi and gu systems are negative, except as otherwise noted.      Objective    /68 (BP Location: Left arm, Patient Position: Chair, Cuff Size: Adult Regular)   Pulse 58   Temp 97.6  F (36.4  C) (Tympanic)   Resp 18   Ht 1.753 m (5' 9\")   Wt 86.6 kg (190 lb 14.4 oz)   SpO2 98%   BMI 28.19 kg/m    Body mass index is 28.19 kg/m .  Physical Exam   GENERAL: alert, no distress, elderly very pleasant " man.    NECK: no adenopathy, no asymmetry, masses, or scars and thyroid normal to palpation  RESP: lungs clear to auscultation - no rales, rhonchi or wheezes  CV: regular rate and rhythm, normal S1 S2, no S3 or S4, no murmur, click or rub, no peripheral edema and peripheral pulses strong  MS: no gross musculoskeletal defects noted, no edema  PSYCH: mentation appears normal, affect normal/bright

## 2023-03-07 LAB
ALBUMIN SERPL BCG-MCNC: 4.3 G/DL (ref 3.5–5.2)
ALP SERPL-CCNC: 87 U/L (ref 40–129)
ALT SERPL W P-5'-P-CCNC: 11 U/L (ref 10–50)
ANION GAP SERPL CALCULATED.3IONS-SCNC: 12 MMOL/L (ref 7–15)
AST SERPL W P-5'-P-CCNC: 24 U/L (ref 10–50)
BILIRUB SERPL-MCNC: 0.4 MG/DL
BUN SERPL-MCNC: 24.4 MG/DL (ref 8–23)
CALCIUM SERPL-MCNC: 9.6 MG/DL (ref 8.8–10.2)
CHLORIDE SERPL-SCNC: 101 MMOL/L (ref 98–107)
CHOLEST SERPL-MCNC: 165 MG/DL
CREAT SERPL-MCNC: 1.41 MG/DL (ref 0.67–1.17)
DEPRECATED HCO3 PLAS-SCNC: 26 MMOL/L (ref 22–29)
GFR SERPL CREATININE-BSD FRML MDRD: 48 ML/MIN/1.73M2
GLUCOSE SERPL-MCNC: 102 MG/DL (ref 70–99)
HDLC SERPL-MCNC: 40 MG/DL
LDLC SERPL CALC-MCNC: 103 MG/DL
NONHDLC SERPL-MCNC: 125 MG/DL
POTASSIUM SERPL-SCNC: 4.8 MMOL/L (ref 3.4–5.3)
PROT SERPL-MCNC: 7.3 G/DL (ref 6.4–8.3)
SODIUM SERPL-SCNC: 139 MMOL/L (ref 136–145)
T4 FREE SERPL-MCNC: 1.28 NG/DL (ref 0.9–1.7)
TRIGL SERPL-MCNC: 112 MG/DL
TSH SERPL DL<=0.005 MIU/L-ACNC: 5.15 UIU/ML (ref 0.3–4.2)
VIT B12 SERPL-MCNC: 481 PG/ML (ref 232–1245)

## 2023-07-11 NOTE — PROGRESS NOTES
"  Assessment & Plan     1. Hypertensive heart disease with chronic diastolic congestive heart failure (H)  Well controlled   - Lipid Profile; Future  - Comprehensive metabolic panel; Future  - TSH with free T4 reflex; Future  - CBC with platelets; Future    2. Stage 3a chronic kidney disease (H)  Do not use NSAIDS    3. Generalized anxiety disorder  Ativan as needed     4. Rosacea  Metronidazole to face    5. Generalized muscle weakness  - Physical Therapy Referral; Future    6. Vitamin B12 deficiency (non anemic)  - Vitamin B12; Future    7. Chronic rhinitis  - loratadine (CLARITIN) 10 MG tablet; Take 1 tablet (10 mg) by mouth daily  Dispense: 90 tablet; Refill: 3       BMI:   Estimated body mass index is 25.98 kg/m  as calculated from the following:    Height as of this encounter: 1.753 m (5' 9\").    Weight as of this encounter: 79.8 kg (175 lb 14.4 oz).       Follow-up when therapy is completed.     Eunice Dias NP  [facility] - [place] PH[place] Sharp is a 88 year old, presenting for the following health issues:  Hypertension, Heart Failure,  chronic kidney disease , and weakness in legs           HPI     Hypertension Follow-up      Do you check your blood pressure regularly outside of the clinic? Yes     Are you following a low salt diet? Yes    Are your blood pressures ever more than 140 on the top number (systolic) OR more   than 90 on the bottom number (diastolic), for example 140/90? No    Heart Failure Follow-up    Are you experiencing any shortness of breath? No    Are you experiencing any swelling in your legs or feet?  Stable    Are you using more pillows than usual? No    Do you cough at night?  No    Do you check your weight daily?  No    Have you had a weight change recently?  No    Are you having any of the following side effects from your medications? (Select all that apply)  Fatigue    Since your last visit, how many times have you gone to the cardiologist, urgent " care, emergency room, or hospital because of your heart failure?   None    Chronic Kidney Disease Follow-up    Do you take any over the counter pain medicine?: Yes  What over the counter medicine are you taking for your pain?:  Tylenol       How often do you take this medicine?:  One time daily 1000 mg daily         Concern - weakness in legs   Onset: couple months   Description: just weak   Intensity: severe  Progression of Symptoms:  worsening  Accompanying Signs & Symptoms: does not eat right or exercise right   Previous history of similar problem: no   Precipitating factors:        Worsened by: unknown   Alleviating factors:        Improved by: nothing   Therapies tried and outcome: None    His wife has been in the hospital - ICU for eight days then nursing home for strengthening.,  She was discharged yesterday and is now home.  He was going to [place] daily to see her and did not eat very well, could not sleep well and was not moving around much and just feels deconditioned.  He would like to repeat PT for generalized strengthening.          Recent Labs   Lab Test 03/06/23  1645 07/14/22  1502 03/24/22  1417 10/14/21  1036 05/17/21  0925 02/15/21  1022   0000   * 88  --  120* 89 101*  --    HDL 40 36*  --  42 37* 44  --    TRIG 112 191*  --  121 94 128  --    ALT 11  --   --   --  17 14  --    CR 1.41* 1.43*   < > 1.35* 1.35* 1.35*  --    GFRESTIMATED 48* 47*   < > 47* 47* 47*  --    GFRESTBLACK  --   --   --   --  54* 55*  --    POTASSIUM 4.8 4.3   < > 4.4 4.3 4.3  --    TSH 5.15* 3.75  --  3.46 4.14* 4.04*   < >    < > = values in this interval not displayed.      BP Readings from Last 3 Encounters:   07/13/23 126/62   03/06/23 128/68   09/30/22 132/72    Wt Readings from Last 3 Encounters:   07/13/23 79.8 kg (175 lb 14.4 oz)   03/06/23 86.6 kg (190 lb 14.4 oz)   09/30/22 86.5 kg (190 lb 11.2 oz)                        Review of Systems   Constitutional, HEENT, cardiovascular, pulmonary, gi and gu  "systems are negative, except as otherwise noted.      Objective    /62 (BP Location: Left arm, Patient Position: Chair, Cuff Size: Adult Regular)   Pulse 60   Temp 98.3  F (36.8  C) (Tympanic)   Resp 18   Ht 1.753 m (5' 9\")   Wt 79.8 kg (175 lb 14.4 oz)   SpO2 98%   BMI 25.98 kg/m    Body mass index is 25.98 kg/m .  Physical Exam   GENERAL: alert, no distress, frail and elderly  NECK: no adenopathy, no asymmetry, masses, or scars and thyroid normal to palpation  RESP: lungs clear to auscultation - no rales, rhonchi or wheezes  CV: regular rate and rhythm, normal S1 S2, no S3 or S4, no murmur, click or rub, no peripheral edema and peripheral pulses strong  MS: generalized weakness, walking with a cane.  slo to rise from seated position to standing.    PSYCH: mentation appears normal, affect normal/bright                    "

## 2023-07-13 ENCOUNTER — OFFICE VISIT (OUTPATIENT)
Dept: FAMILY MEDICINE | Facility: OTHER | Age: 88
End: 2023-07-13
Attending: NURSE PRACTITIONER
Payer: MEDICARE

## 2023-07-13 VITALS
RESPIRATION RATE: 18 BRPM | WEIGHT: 175.9 LBS | TEMPERATURE: 98.3 F | HEIGHT: 69 IN | OXYGEN SATURATION: 98 % | HEART RATE: 60 BPM | DIASTOLIC BLOOD PRESSURE: 62 MMHG | BODY MASS INDEX: 26.05 KG/M2 | SYSTOLIC BLOOD PRESSURE: 126 MMHG

## 2023-07-13 DIAGNOSIS — M62.81 GENERALIZED MUSCLE WEAKNESS: ICD-10-CM

## 2023-07-13 DIAGNOSIS — F41.1 GENERALIZED ANXIETY DISORDER: ICD-10-CM

## 2023-07-13 DIAGNOSIS — N18.31 STAGE 3A CHRONIC KIDNEY DISEASE (H): ICD-10-CM

## 2023-07-13 DIAGNOSIS — I50.32 HYPERTENSIVE HEART DISEASE WITH CHRONIC DIASTOLIC CONGESTIVE HEART FAILURE (H): Primary | ICD-10-CM

## 2023-07-13 DIAGNOSIS — J01.90 ACUTE SINUSITIS WITH SYMPTOMS GREATER THAN 10 DAYS: ICD-10-CM

## 2023-07-13 DIAGNOSIS — L71.9 ROSACEA: ICD-10-CM

## 2023-07-13 DIAGNOSIS — E53.8 VITAMIN B12 DEFICIENCY (NON ANEMIC): ICD-10-CM

## 2023-07-13 DIAGNOSIS — I11.0 HYPERTENSIVE HEART DISEASE WITH CHRONIC DIASTOLIC CONGESTIVE HEART FAILURE (H): Primary | ICD-10-CM

## 2023-07-13 DIAGNOSIS — J31.0 CHRONIC RHINITIS: ICD-10-CM

## 2023-07-13 LAB
ALBUMIN SERPL BCG-MCNC: 4.2 G/DL (ref 3.5–5.2)
ALP SERPL-CCNC: 77 U/L (ref 40–129)
ALT SERPL W P-5'-P-CCNC: 15 U/L (ref 0–70)
ANION GAP SERPL CALCULATED.3IONS-SCNC: 14 MMOL/L (ref 7–15)
AST SERPL W P-5'-P-CCNC: 28 U/L (ref 0–45)
BILIRUB SERPL-MCNC: 1 MG/DL
BUN SERPL-MCNC: 34.6 MG/DL (ref 8–23)
CALCIUM SERPL-MCNC: 9.1 MG/DL (ref 8.8–10.2)
CHLORIDE SERPL-SCNC: 105 MMOL/L (ref 98–107)
CHOLEST SERPL-MCNC: 170 MG/DL
CREAT SERPL-MCNC: 1.31 MG/DL (ref 0.67–1.17)
DEPRECATED HCO3 PLAS-SCNC: 21 MMOL/L (ref 22–29)
ERYTHROCYTE [DISTWIDTH] IN BLOOD BY AUTOMATED COUNT: 14.7 % (ref 10–15)
GFR SERPL CREATININE-BSD FRML MDRD: 52 ML/MIN/1.73M2
GLUCOSE SERPL-MCNC: 121 MG/DL (ref 70–99)
HCT VFR BLD AUTO: 43.2 % (ref 40–53)
HDLC SERPL-MCNC: 36 MG/DL
HGB BLD-MCNC: 14.1 G/DL (ref 13.3–17.7)
LDLC SERPL CALC-MCNC: 98 MG/DL
MCH RBC QN AUTO: 30.1 PG (ref 26.5–33)
MCHC RBC AUTO-ENTMCNC: 32.6 G/DL (ref 31.5–36.5)
MCV RBC AUTO: 92 FL (ref 78–100)
NONHDLC SERPL-MCNC: 134 MG/DL
PLATELET # BLD AUTO: 216 10E3/UL (ref 150–450)
POTASSIUM SERPL-SCNC: 4.7 MMOL/L (ref 3.4–5.3)
PROT SERPL-MCNC: 6.9 G/DL (ref 6.4–8.3)
RBC # BLD AUTO: 4.69 10E6/UL (ref 4.4–5.9)
SODIUM SERPL-SCNC: 140 MMOL/L (ref 136–145)
T4 FREE SERPL-MCNC: 1.26 NG/DL (ref 0.9–1.7)
TRIGL SERPL-MCNC: 181 MG/DL
TSH SERPL DL<=0.005 MIU/L-ACNC: 4.32 UIU/ML (ref 0.3–4.2)
WBC # BLD AUTO: 7.3 10E3/UL (ref 4–11)

## 2023-07-13 PROCEDURE — G0463 HOSPITAL OUTPT CLINIC VISIT: HCPCS

## 2023-07-13 PROCEDURE — 80053 COMPREHEN METABOLIC PANEL: CPT | Mod: ZL | Performed by: NURSE PRACTITIONER

## 2023-07-13 PROCEDURE — 85027 COMPLETE CBC AUTOMATED: CPT | Mod: ZL | Performed by: NURSE PRACTITIONER

## 2023-07-13 PROCEDURE — 84443 ASSAY THYROID STIM HORMONE: CPT | Mod: ZL | Performed by: NURSE PRACTITIONER

## 2023-07-13 PROCEDURE — 82607 VITAMIN B-12: CPT | Mod: ZL | Performed by: NURSE PRACTITIONER

## 2023-07-13 PROCEDURE — 80061 LIPID PANEL: CPT | Mod: ZL | Performed by: NURSE PRACTITIONER

## 2023-07-13 PROCEDURE — 84439 ASSAY OF FREE THYROXINE: CPT | Mod: ZL | Performed by: NURSE PRACTITIONER

## 2023-07-13 PROCEDURE — 36415 COLL VENOUS BLD VENIPUNCTURE: CPT | Mod: ZL | Performed by: NURSE PRACTITIONER

## 2023-07-13 PROCEDURE — 99214 OFFICE O/P EST MOD 30 MIN: CPT | Performed by: NURSE PRACTITIONER

## 2023-07-13 RX ORDER — LORATADINE 10 MG/1
10 TABLET ORAL DAILY
Qty: 90 TABLET | Refills: 3 | Status: SHIPPED | OUTPATIENT
Start: 2023-07-13

## 2023-07-13 ASSESSMENT — PAIN SCALES - GENERAL: PAINLEVEL: NO PAIN (0)

## 2023-07-13 NOTE — PATIENT INSTRUCTIONS
"  Assessment & Plan     1. Hypertensive heart disease with chronic diastolic congestive heart failure (H)  Well controlled   - Lipid Profile; Future  - Comprehensive metabolic panel; Future  - TSH with free T4 reflex; Future  - CBC with platelets; Future    2. Stage 3a chronic kidney disease (H)  Do not use NSAIDS    3. Generalized anxiety disorder  Ativan as needed     4. Rosacea  Metronidazole to face    5. Generalized muscle weakness  - Physical Therapy Referral; Future    6. Vitamin B12 deficiency (non anemic)  - Vitamin B12; Future    7. Chronic rhinitis  - loratadine (CLARITIN) 10 MG tablet; Take 1 tablet (10 mg) by mouth daily  Dispense: 90 tablet; Refill: 3       BMI:   Estimated body mass index is 25.98 kg/m  as calculated from the following:    Height as of this encounter: 1.753 m (5' 9\").    Weight as of this encounter: 79.8 kg (175 lb 14.4 oz).       Follow-up when therapy is completed.     Eunice Dias NP  [facility]  "

## 2023-07-14 LAB — VIT B12 SERPL-MCNC: 314 PG/ML (ref 232–1245)

## 2023-07-25 ENCOUNTER — THERAPY VISIT (OUTPATIENT)
Dept: PHYSICAL THERAPY | Facility: OTHER | Age: 88
End: 2023-07-25
Attending: NURSE PRACTITIONER
Payer: MEDICARE

## 2023-07-25 DIAGNOSIS — M62.81 GENERALIZED MUSCLE WEAKNESS: ICD-10-CM

## 2023-07-25 DIAGNOSIS — M62.81 MUSCLE WEAKNESS (GENERALIZED): Primary | ICD-10-CM

## 2023-07-25 PROCEDURE — 97161 PT EVAL LOW COMPLEX 20 MIN: CPT | Mod: GP

## 2023-07-25 PROCEDURE — 97530 THERAPEUTIC ACTIVITIES: CPT | Mod: GP

## 2023-07-25 NOTE — PROGRESS NOTES
"PHYSICAL THERAPY EVALUATION  Type of Visit: Evaluation    See electronic medical record for Abuse and Falls Screening details.    Subjective       Presenting condition or subjective complaint: leg weakness  Date of onset: 07/25/20    Relevant medical history: Cancer; High blood pressure; Migraines or headaches; Neck injury   Dates & types of surgery: rotator cuff    Prior diagnostic imaging/testing results:       Prior therapy history for the same diagnosis, illness or injury: No      Prior Level of Function  Transfers: Independent  Ambulation: Assistive equipment  ADL: Assistive equipment  IADL:     Living Environment  Social support: With a significant other or spouse   Type of home: House; 2-story; Basement   Stairs to enter the home: Yes       Ramp: No   Stairs inside the home: Yes 2 Is there a railing: Yes   Help at home:    Equipment owned: Straight Cane     Employment: No    Hobbies/Interests: walking -doesnt like to sit    Patient goals for therapy:      Pain assessment:  Patient denies any pain.  She reports onset of lower extremity weakness and feeling \"unsteady \"for the last 2 to 3 years especially.  He has been using a single and cane however still feels somewhat unsteady and often will hold onto walls/furniture.  He has noted being less active but would like to be able to walk longer distances.     Objective   KNEE EVALUATION  PAIN: Pain Level at Rest: 0/10  INTEGUMENTARY (edema, incisions):   POSTURE: WFL  GAIT:  Weightbearing Status:   Assistive Device(s): Cane (single end)  Gait Deviations: Stride length decreased  [place] decreased  BALANCE/PROPRIOCEPTION: Single Leg Stance Eyes Open (seconds): unable to stand without max UE support  WEIGHTBEARING ALIGNMENT:   NON-WEIGHTBEARING ALIGNMENT:   ROM: AROM WFL    STRENGTH:   Pain: - none + mild ++ moderate +++ severe  Strength Scale: 0-5/5 Left Right   Knee Flexion 4- 4-   Knee Extension 4+ 4+   Quad Set ++ (mod) ++ (mod)   Hip flexion: 4 -/5 bilaterally, " hip abduction 4/4 bilaterally, hip extension: Right = 4 /5, left = 4 -/5, dorsiflexion: 4+/5 bilaterally  FLEXIBILITY: WFL  SPECIAL TESTS: WFL  FUNCTIONAL TESTS: SLS: Patient is unable to maintain a static leg stance position without maximal upper extremity support  PALPATION: WFL  JOINT MOBILITY: WFL  Positive hamstring, quadricep, and gastroc muscle tightness bilaterally.  Decreased hip internal rotation bilaterally    Assessment & Plan   CLINICAL IMPRESSIONS  Medical Diagnosis: Generalized muscle weakness    Treatment Diagnosis: Generalized muscle weakness   Impression/Assessment: Patient is a 88 year old male with lower extremity weakness and balance complaints.  The following significant findings have been identified: Decreased ROM/flexibility, Decreased strength, Impaired balance, Impaired muscle performance, and Decreased activity tolerance. These impairments interfere with their ability to perform self care tasks, recreational activities, household chores, household mobility, and community mobility as compared to previous level of function.     Clinical Decision Making (Complexity):  Clinical Presentation: Stable/Uncomplicated  Clinical Presentation Rationale: based on medical and personal factors listed in PT evaluation  Clinical Decision Making (Complexity): Low complexity    PLAN OF CARE  Treatment Interventions:  Interventions: Gait Training, Manual Therapy, Neuromuscular Re-education, Therapeutic Activity, Therapeutic Exercise    Long Term Goals     PT Goal 1  Goal Identifier: STG 1  Goal Description: Patient to begin home stretching program  Target Date: 08/01/23  PT Goal 2  Goal Identifier: LTG 1  Goal Description: Patient will demonstrate independence with home exercise program  Target Date: 09/19/23  PT Goal 3  Goal Identifier: LTG 2  Goal Description: Patient will be able to walk community distances without significant increased fatigue  Rationale: to maximize safety and independence within the  community  Target Date: 09/19/23      Frequency of Treatment: 1x/week  Duration of Treatment: up to 8 wks    Recommended Referrals to Other Professionals: Physical Therapy  Education Assessment:   Learner/Method: Patient;Listening;No Barriers to Learning    Risks and benefits of evaluation/treatment have been explained.   Patient/Family/caregiver agrees with Plan of Care.     Evaluation Time:     PT Eval, Low Complexity Minutes (73551): 35       Signing Clinician: MARCELLA Crespo [facility]                                                                                   OUTPATIENT PHYSICAL THERAPY      PLAN OF TREATMENT FOR OUTPATIENT REHABILITATION   Patient's Last Name, First Name, Enzo Morin YOB: 1934   Provider's Name   [facility]   Medical Record No.  [id number]     Onset Date: 07/25/20  Start of Care Date: 07/25/23     Medical Diagnosis:  Generalized muscle weakness      PT Treatment Diagnosis:  Generalized muscle weakness Plan of Treatment  Frequency/Duration: 1x/week/ up to 8 wks    Certification date from 07/25/23 to 09/19/23         See note for plan of treatment details and functional goals     Precious Brennan PT                         I CERTIFY THE NEED FOR THESE SERVICES FURNISHED UNDER        THIS PLAN OF TREATMENT AND WHILE UNDER MY CARE     (Physician attestation of this document indicates review and certification of the therapy plan).                Referring Provider:  Eunice Dias      Initial Assessment  See Epic Evaluation- Start of Care Date: 07/25/23

## 2023-07-28 ENCOUNTER — THERAPY VISIT (OUTPATIENT)
Dept: PHYSICAL THERAPY | Facility: OTHER | Age: 88
End: 2023-07-28
Attending: NURSE PRACTITIONER
Payer: MEDICARE

## 2023-07-28 DIAGNOSIS — M62.81 MUSCLE WEAKNESS (GENERALIZED): Primary | ICD-10-CM

## 2023-07-28 PROCEDURE — 97110 THERAPEUTIC EXERCISES: CPT | Mod: GP

## 2023-08-02 ENCOUNTER — THERAPY VISIT (OUTPATIENT)
Dept: PHYSICAL THERAPY | Facility: OTHER | Age: 88
End: 2023-08-02
Attending: NURSE PRACTITIONER
Payer: MEDICARE

## 2023-08-02 DIAGNOSIS — M62.81 MUSCLE WEAKNESS (GENERALIZED): Primary | ICD-10-CM

## 2023-08-02 PROCEDURE — 97110 THERAPEUTIC EXERCISES: CPT | Mod: GP

## 2023-08-03 NOTE — PROGRESS NOTES
"  Assessment & Plan     1. Unsteady gait when walking  Order for wheeled walker with seat due to unsteady gait.    - Walker Order for DME - ONLY FOR DME    2. Need for assistance due to unsteady gait  As above  - Walker Order for DME - ONLY FOR DME    3. Hypertensive heart disease with chronic diastolic congestive heart failure (H)  As above  - Walker Order for DME - ONLY FOR DME           BMI:   Estimated body mass index is 27.2 kg/m  as calculated from the following:    Height as of 7/13/23: 1.753 m (5' 9\").    Weight as of this encounter: 83.6 kg (184 lb 3.2 oz).     Follow-up as needed     Eunice Dias NP  [facility] - [place] PHUONG Sharp is a 88 year old, presenting for the following health issues:  Gait Issues      HPI     Concern - Face to Face for Walker   Onset: ongoing   Description: unsteady gait   Intensity: moderate  Progression of Symptoms:  worsening  Accompanying Signs & Symptoms: weakness in the legs  Previous history of similar problem: no  Precipitating factors:        Worsened by: not having a walker   Alleviating factors:        Improved by: walker   Therapies tried and outcome: use of cane    He is in physical therapy to strengthen lower extremities.  He feels unsteady on his feet with walking with his cane and often times will need to lean on furniture or walls with ambulation.      Recent Labs   Lab Test 07/13/23  1435 03/06/23  1645 07/14/22  1502 10/14/21  1036 05/17/21  0925 02/15/21  1022   LDL 98 103* 88   < > 89 101*   HDL 36* 40 36*   < > 37* 44   TRIG 181* 112 191*   < > 94 128   ALT 15 11  --   --  17 14   CR 1.31* 1.41* 1.43*   < > 1.35* 1.35*   GFRESTIMATED 52* 48* 47*   < > 47* 47*   GFRESTBLACK  --   --   --   --  54* 55*   POTASSIUM 4.7 4.8 4.3   < > 4.3 4.3   TSH 4.32* 5.15* 3.75   < > 4.14* 4.04*    < > = values in this interval not displayed.      BP Readings from Last 3 Encounters:   08/07/23 128/62   07/13/23 126/62   03/06/23 128/68    Wt " Readings from Last 3 Encounters:   08/07/23 83.6 kg (184 lb 3.2 oz)   07/13/23 79.8 kg (175 lb 14.4 oz)   03/06/23 86.6 kg (190 lb 14.4 oz)                        Review of Systems   Constitutional, HEENT, cardiovascular, pulmonary, gi and gu systems are negative, except as otherwise noted.      Objective    /62 (BP Location: Right arm, Patient Position: Sitting, Cuff Size: Adult Regular)   Pulse 57   Temp 98.2  F (36.8  C) (Tympanic)   Wt 83.6 kg (184 lb 3.2 oz)   SpO2 98%   BMI 27.20 kg/m    Body mass index is 27.2 kg/m .  Physical Exam   GENERAL: alert, no distress, frail, and elderly  RESP: lungs clear to auscultation - no rales, rhonchi or wheezes  CV: regular rate and rhythm, normal S1 S2, no S3 or S4, no murmur, click or rub, no peripheral edema and peripheral pulses strong  MS: unsteady gait, holding onto cane and wall when leaving the clinic.    PSYCH: mentation appears normal, affect normal/bright

## 2023-08-07 ENCOUNTER — OFFICE VISIT (OUTPATIENT)
Dept: FAMILY MEDICINE | Facility: OTHER | Age: 88
End: 2023-08-07
Attending: NURSE PRACTITIONER
Payer: MEDICARE

## 2023-08-07 VITALS
WEIGHT: 184.2 LBS | HEART RATE: 57 BPM | OXYGEN SATURATION: 98 % | BODY MASS INDEX: 27.2 KG/M2 | DIASTOLIC BLOOD PRESSURE: 62 MMHG | TEMPERATURE: 98.2 F | SYSTOLIC BLOOD PRESSURE: 128 MMHG

## 2023-08-07 DIAGNOSIS — R26.89 NEED FOR ASSISTANCE DUE TO UNSTEADY GAIT: ICD-10-CM

## 2023-08-07 DIAGNOSIS — I50.32 HYPERTENSIVE HEART DISEASE WITH CHRONIC DIASTOLIC CONGESTIVE HEART FAILURE (H): ICD-10-CM

## 2023-08-07 DIAGNOSIS — I11.0 HYPERTENSIVE HEART DISEASE WITH CHRONIC DIASTOLIC CONGESTIVE HEART FAILURE (H): ICD-10-CM

## 2023-08-07 DIAGNOSIS — R26.81 UNSTEADY GAIT WHEN WALKING: Primary | ICD-10-CM

## 2023-08-07 PROCEDURE — G0463 HOSPITAL OUTPT CLINIC VISIT: HCPCS

## 2023-08-07 PROCEDURE — 99213 OFFICE O/P EST LOW 20 MIN: CPT | Performed by: NURSE PRACTITIONER

## 2023-08-07 ASSESSMENT — PAIN SCALES - GENERAL: PAINLEVEL: NO PAIN (0)

## 2023-08-10 ENCOUNTER — THERAPY VISIT (OUTPATIENT)
Dept: PHYSICAL THERAPY | Facility: OTHER | Age: 88
End: 2023-08-10
Payer: MEDICARE

## 2023-08-10 DIAGNOSIS — M62.81 MUSCLE WEAKNESS (GENERALIZED): Primary | ICD-10-CM

## 2023-08-10 PROCEDURE — 97110 THERAPEUTIC EXERCISES: CPT | Mod: GP

## 2023-08-23 ENCOUNTER — THERAPY VISIT (OUTPATIENT)
Dept: PHYSICAL THERAPY | Facility: OTHER | Age: 88
End: 2023-08-23
Payer: MEDICARE

## 2023-08-23 DIAGNOSIS — M62.81 MUSCLE WEAKNESS (GENERALIZED): Primary | ICD-10-CM

## 2023-08-23 PROCEDURE — 97110 THERAPEUTIC EXERCISES: CPT | Mod: GP

## 2023-09-27 ENCOUNTER — OFFICE VISIT (OUTPATIENT)
Dept: FAMILY MEDICINE | Facility: OTHER | Age: 88
End: 2023-09-27
Attending: NURSE PRACTITIONER
Payer: MEDICARE

## 2023-09-27 VITALS
BODY MASS INDEX: 27.12 KG/M2 | WEIGHT: 183.1 LBS | TEMPERATURE: 97.1 F | HEIGHT: 69 IN | RESPIRATION RATE: 18 BRPM | SYSTOLIC BLOOD PRESSURE: 122 MMHG | OXYGEN SATURATION: 97 % | DIASTOLIC BLOOD PRESSURE: 56 MMHG | HEART RATE: 56 BPM

## 2023-09-27 DIAGNOSIS — N18.31 STAGE 3A CHRONIC KIDNEY DISEASE (H): ICD-10-CM

## 2023-09-27 DIAGNOSIS — F41.1 GAD (GENERALIZED ANXIETY DISORDER): ICD-10-CM

## 2023-09-27 DIAGNOSIS — I11.0 HYPERTENSIVE HEART DISEASE WITH CHRONIC DIASTOLIC CONGESTIVE HEART FAILURE (H): Primary | ICD-10-CM

## 2023-09-27 DIAGNOSIS — I50.32 HYPERTENSIVE HEART DISEASE WITH CHRONIC DIASTOLIC CONGESTIVE HEART FAILURE (H): Primary | ICD-10-CM

## 2023-09-27 LAB
ALBUMIN SERPL BCG-MCNC: 4.2 G/DL (ref 3.5–5.2)
ALP SERPL-CCNC: 77 U/L (ref 40–129)
ALT SERPL W P-5'-P-CCNC: 9 U/L (ref 0–70)
ANION GAP SERPL CALCULATED.3IONS-SCNC: 10 MMOL/L (ref 7–15)
AST SERPL W P-5'-P-CCNC: 22 U/L (ref 0–45)
BILIRUB SERPL-MCNC: 0.3 MG/DL
BUN SERPL-MCNC: 29.6 MG/DL (ref 8–23)
CALCIUM SERPL-MCNC: 9.1 MG/DL (ref 8.8–10.2)
CHLORIDE SERPL-SCNC: 105 MMOL/L (ref 98–107)
CHOLEST SERPL-MCNC: 172 MG/DL
CREAT SERPL-MCNC: 1.59 MG/DL (ref 0.67–1.17)
CREAT UR-MCNC: 117.3 MG/DL
DEPRECATED HCO3 PLAS-SCNC: 24 MMOL/L (ref 22–29)
EGFRCR SERPLBLD CKD-EPI 2021: 41 ML/MIN/1.73M2
GLUCOSE SERPL-MCNC: 114 MG/DL (ref 70–99)
HDLC SERPL-MCNC: 42 MG/DL
HOLD SPECIMEN: NORMAL
LDLC SERPL CALC-MCNC: 107 MG/DL
MICROALBUMIN UR-MCNC: 140.2 MG/L
MICROALBUMIN/CREAT UR: 119.52 MG/G CR (ref 0–17)
NONHDLC SERPL-MCNC: 130 MG/DL
POTASSIUM SERPL-SCNC: 4.5 MMOL/L (ref 3.4–5.3)
PROT SERPL-MCNC: 6.8 G/DL (ref 6.4–8.3)
SODIUM SERPL-SCNC: 139 MMOL/L (ref 135–145)
TRIGL SERPL-MCNC: 113 MG/DL
TSH SERPL DL<=0.005 MIU/L-ACNC: 4.14 UIU/ML (ref 0.3–4.2)

## 2023-09-27 PROCEDURE — 80061 LIPID PANEL: CPT | Mod: ZL | Performed by: NURSE PRACTITIONER

## 2023-09-27 PROCEDURE — 99214 OFFICE O/P EST MOD 30 MIN: CPT | Performed by: NURSE PRACTITIONER

## 2023-09-27 PROCEDURE — 82570 ASSAY OF URINE CREATININE: CPT | Mod: ZL | Performed by: NURSE PRACTITIONER

## 2023-09-27 PROCEDURE — G0463 HOSPITAL OUTPT CLINIC VISIT: HCPCS

## 2023-09-27 PROCEDURE — 80053 COMPREHEN METABOLIC PANEL: CPT | Mod: ZL | Performed by: NURSE PRACTITIONER

## 2023-09-27 PROCEDURE — 84443 ASSAY THYROID STIM HORMONE: CPT | Mod: ZL | Performed by: NURSE PRACTITIONER

## 2023-09-27 PROCEDURE — 36415 COLL VENOUS BLD VENIPUNCTURE: CPT | Mod: ZL | Performed by: NURSE PRACTITIONER

## 2023-09-27 ASSESSMENT — ANXIETY QUESTIONNAIRES
2. NOT BEING ABLE TO STOP OR CONTROL WORRYING: NOT AT ALL
4. TROUBLE RELAXING: NOT AT ALL
5. BEING SO RESTLESS THAT IT IS HARD TO SIT STILL: NOT AT ALL
6. BECOMING EASILY ANNOYED OR IRRITABLE: NOT AT ALL
1. FEELING NERVOUS, ANXIOUS, OR ON EDGE: NOT AT ALL
GAD7 TOTAL SCORE: 0
7. FEELING AFRAID AS IF SOMETHING AWFUL MIGHT HAPPEN: NOT AT ALL
GAD7 TOTAL SCORE: 0
IF YOU CHECKED OFF ANY PROBLEMS ON THIS QUESTIONNAIRE, HOW DIFFICULT HAVE THESE PROBLEMS MADE IT FOR YOU TO DO YOUR WORK, TAKE CARE OF THINGS AT HOME, OR GET ALONG WITH OTHER PEOPLE: NOT DIFFICULT AT ALL
3. WORRYING TOO MUCH ABOUT DIFFERENT THINGS: NOT AT ALL

## 2023-09-27 ASSESSMENT — PAIN SCALES - GENERAL: PAINLEVEL: NO PAIN (0)

## 2023-09-27 NOTE — PATIENT INSTRUCTIONS
"  Assessment & Plan     1. Hypertensive heart disease with chronic diastolic congestive heart failure (H)  Continue current plan  - Lipid Profile; Future  - Comprehensive metabolic panel; Future  - TSH with free T4 reflex; Future    2. Stage 3a chronic kidney disease (H)  - Albumin Random Urine Quantitative with Creat Ratio; Future    3. RUMA (generalized anxiety disorder)  Ativan as needed        BMI:   Estimated body mass index is 27.04 kg/m  as calculated from the following:    Height as of this encounter: 1.753 m (5' 9\").    Weight as of this encounter: 83.1 kg (183 lb 1.6 oz).   Weight management plan: Discussed healthy diet and exercise guidelines        Return in about 6 months (around 3/27/2024) for hypertension.    Eunice Dias NP  [facility]    "

## 2023-09-27 NOTE — PROGRESS NOTES
"  Assessment & Plan     1. Hypertensive heart disease with chronic diastolic congestive heart failure (H)  Continue current plan  - Lipid Profile; Future  - Comprehensive metabolic panel; Future  - TSH with free T4 reflex; Future    2. Stage 3a chronic kidney disease (H)  - Albumin Random Urine Quantitative with Creat Ratio; Future    3. RUMA (generalized anxiety disorder)  Ativan as needed        BMI:   Estimated body mass index is 27.04 kg/m  as calculated from the following:    Height as of this encounter: 1.753 m (5' 9\").    Weight as of this encounter: 83.1 kg (183 lb 1.6 oz).   Weight management plan: Discussed healthy diet and exercise guidelines        Return in about 6 months (around 3/27/2024) for hypertension.    Eunice Dias NP  [facility] - OSBALDO Sharp is a 88 year old, presenting for the following health issues:  Hypertension, Lipids, Heart Failure, and chronic kidney disease       HPI     Hyperlipidemia Follow-Up    Are you regularly taking any medication or supplement to lower your cholesterol?   No  Are you having muscle aches or other side effects that you think could be caused by your cholesterol lowering medication?  No    Hypertension Follow-up    Do you check your blood pressure regularly outside of the clinic? Yes   Are you following a low salt diet? Yes  Are your blood pressures ever more than 140 on the top number (systolic) OR more   than 90 on the bottom number (diastolic), for example 140/90? No    Heart Failure Follow-up  Are you experiencing any shortness of breath? No  Are you experiencing any swelling in your legs or feet?  Stable  Are you using more pillows than usual? No  Do you cough at night?  No  Do you check your weight daily?  No  Have you had a weight change recently?  No  Are you having any of the following side effects from your medications? (Select all that apply)  Dizziness some times   Since your last visit, how many times have you " gone to the cardiologist, urgent care, emergency room, or hospital because of your heart failure?   None  Last Echo: No results found.  Anxiety Follow-Up  How are you doing with your anxiety since your last visit? No change  Are you having other symptoms that might be associated with anxiety? No  Have you had a significant life event? No   Are you feeling depressed? No  Do you have any concerns with your use of alcohol or other drugs? No    Social History     Tobacco Use    Smoking status: Former     Packs/day: 0.00     Types: Cigarettes     Quit date:      Years since quittin.7    Smokeless tobacco: Former     Quit date: 2001   Vaping Use    Vaping Use: Never used   Substance Use Topics    Alcohol use: No    Drug use: No         2018     3:00 PM 3/6/2023     3:00 PM 2023     2:00 PM   RUMA-7 SCORE   Total Score 0 0 0         2018    11:00 AM 2018     3:00 PM 3/6/2023     3:00 PM   PHQ   PHQ-9 Total Score 0 2 0   Q9: Thoughts of better off dead/self-harm past 2 weeks Not at all Not at all Not at all       Chronic Kidney Disease Follow-up    Do you take any over the counter pain medicine?: Yes  What over the counter medicine are you taking for your pain?:  tylenol     How often do you take this medicine?:  A few times a week    He is feeling well, no new concerns.  He continues to do home therapy due to weakness of legs.  He has been using a cane to assist with balance.      Recent Labs   Lab Test 23  1435 23  1645 22  1502 10/14/21  1036 21  0925 02/15/21  1022   LDL 98 103* 88   < > 89 101*   HDL 36* 40 36*   < > 37* 44   TRIG 181* 112 191*   < > 94 128   ALT 15 11  --   --  17 14   CR 1.31* 1.41* 1.43*   < > 1.35* 1.35*   GFRESTIMATED 52* 48* 47*   < > 47* 47*   GFRESTBLACK  --   --   --   --  54* 55*   POTASSIUM 4.7 4.8 4.3   < > 4.3 4.3   TSH 4.32* 5.15* 3.75   < > 4.14* 4.04*    < > = values in this interval not displayed.      BP Readings from Last 3  "Encounters:   09/27/23 122/56   08/07/23 128/62   07/13/23 126/62    Wt Readings from Last 3 Encounters:   09/27/23 83.1 kg (183 lb 1.6 oz)   08/07/23 83.6 kg (184 lb 3.2 oz)   07/13/23 79.8 kg (175 lb 14.4 oz)                        Review of Systems   Constitutional, HEENT, cardiovascular, pulmonary, gi and gu systems are negative, except as otherwise noted.      Objective    /56 (BP Location: Right arm, Patient Position: Chair, Cuff Size: Adult Regular)   Pulse 56   Temp 97.1  F (36.2  C) (Tympanic)   Resp 18   Ht 1.753 m (5' 9\")   Wt 83.1 kg (183 lb 1.6 oz)   SpO2 97%   BMI 27.04 kg/m    Body mass index is 27.04 kg/m .  Physical Exam   GENERAL: healthy, alert and no distress  NECK: no adenopathy, no asymmetry, masses, or scars and thyroid normal to palpation  RESP: lungs clear to auscultation - no rales, rhonchi or wheezes  CV: regular rate and rhythm, normal S1 S2, no S3 or S4, no murmur, click or rub, no peripheral edema and peripheral pulses strong  MS: no gross musculoskeletal defects noted, no edema  PSYCH: mentation appears normal, affect normal/bright                      "

## 2023-10-03 NOTE — PROGRESS NOTES
"    DISCHARGE  Reason for Discharge: Patient has met all goals.    Equipment Issued: none    Discharge Plan: Patient to continue home program.   08/23/23 1256   Appointment Info   Signing clinician's name / credentials Precious Brennan PT   Visits Used 5-10 medicare   Medical Diagnosis Generalized muscle weakness   PT Tx Diagnosis Generalized muscle weakness   Progress Note/Certification   Start of Care Date 07/25/23   Onset of illness/injury or Date of Surgery 07/25/20   Therapy Frequency 1x/week   Predicted Duration up to 8 wks   Certification date from 07/25/23   Certification date to 09/19/23   PT Goal 1   Goal Identifier STG 1   Goal Description Patient to begin home stretching program   Target Date 08/01/23   Date Met 08/01/23   PT Goal 2   Goal Identifier LTG 1   Goal Description Patient will demonstrate independence with home exercise program   Target Date 09/19/23   Date Met 08/23/23   PT Goal 3   Goal Identifier LTG 2   Goal Description Patient will be able to walk community distances without significant increased fatigue   Target Date 09/19/23   Date Met 08/23/23   Subjective Report   Subjective Report pt reports he was ill last week so didnt really work on the exs much. Overall, he notes the exs are going good and they \"seem to be helping\". He did get his walker but didnt bring it todayand states he really likes it.   Therapeutic Procedure/Exercise   Therapeutic Procedures: strength, endurance, ROM, flexibillity minutes (48470) 30   Ther Proc 1 HEP-   Ther Proc 1 - Details Access Code: [email or web address]  URL: [email or web address]  Date: 08/02/2023  Prepared by: Precious Brennan    Exercises  - Small Range Straight Leg Raise  - 1 x daily - 4-5 x weekly - 1 sets - 5-20 reps - 3-5 secs hold  - Sidelying Hip Abduction  - 1 x daily - 4-5 x weekly - 1 sets - 5-20 reps - 3 secs hold  - Standing Hip Extension with Counter Support  - 1 x daily - 4-5 x weekly - 1 sets - 10-30 reps  - Heel Raises with Counter Support "  - 1 x daily - 4-5 x weekly - 1 sets - 10-30 reps., mini wall sits x4 w cues on good knee alignment, one leg stance with UE support . Added today:standing ham curls x20 each leg w cues to go slowly, LAQ x20.   Skilled Intervention ther ex - verbal and manual cues   Patient Response/Progress he was able to do the exs correctly   Plan   Home program ham curls,, LAQ   Plan for next session d/c to HEP   Total Session Time   Timed Code Treatment Minutes 30   Total Treatment Time (sum of timed and untimed services) 30         Referring Provider:  No ref. provider found

## 2023-10-26 DIAGNOSIS — I50.32 HYPERTENSIVE HEART DISEASE WITH CHRONIC DIASTOLIC CONGESTIVE HEART FAILURE (H): ICD-10-CM

## 2023-10-26 DIAGNOSIS — I11.0 HYPERTENSIVE HEART DISEASE WITH CHRONIC DIASTOLIC CONGESTIVE HEART FAILURE (H): ICD-10-CM

## 2023-10-27 RX ORDER — ATENOLOL 50 MG/1
TABLET ORAL
Qty: 90 TABLET | Refills: 3 | Status: SHIPPED | OUTPATIENT
Start: 2023-10-27 | End: 2024-03-07

## 2023-10-27 RX ORDER — AMLODIPINE BESYLATE 5 MG/1
TABLET ORAL
Qty: 90 TABLET | Refills: 3 | Status: SHIPPED | OUTPATIENT
Start: 2023-10-27 | End: 2024-03-07

## 2023-10-27 NOTE — TELEPHONE ENCOUNTER
Amlodipine      Last Written Prescription Date:  10/31/22  Last Fill Quantity: 90,   # refills: 3  Last Office Visit: 9/27/23  Future Office visit:       Routing refill request to provider for review/approval because:    Atenolol      Last Written Prescription Date:  10/31/22  Last Fill Quantity: 90,   # refills: 3  Last Office Visit: 9/27/23  Future Office visit:       Routing refill request to provider for review/approval because:

## 2023-10-28 DIAGNOSIS — L71.9 ROSACEA: ICD-10-CM

## 2023-10-30 RX ORDER — DOXYCYCLINE 100 MG/1
CAPSULE ORAL
Qty: 60 CAPSULE | Refills: 0 | Status: SHIPPED | OUTPATIENT
Start: 2023-10-30 | End: 2023-12-28

## 2023-10-30 NOTE — TELEPHONE ENCOUNTER
Doxycycline       Last Written Prescription Date:  9/16/2022  Last Fill Quantity: 60,   # refills: 11  Last Office Visit: 9/27/2023  Future Office visit:

## 2023-12-28 DIAGNOSIS — L71.9 ROSACEA: ICD-10-CM

## 2023-12-28 RX ORDER — DOXYCYCLINE 100 MG/1
100 CAPSULE ORAL 2 TIMES DAILY
Qty: 60 CAPSULE | Refills: 4 | Status: SHIPPED | OUTPATIENT
Start: 2023-12-28 | End: 2024-03-07

## 2023-12-28 NOTE — TELEPHONE ENCOUNTER
Reason for call:  Medication      Have you contacted your pharmacy? No   If patient has contacted Pharmacy and it has been over 72hrs, continue to #2  Medication DOXYCYCLINE 100 MG PATIENT IS REQUESTING 3X A DAY  What Pharmacy do you use? [facility] [phone]      (Please note that the turn-around-time for prescriptions is 72 business hours; I am sending your request at this time. SEND TO  [facility]  )

## 2024-01-03 ENCOUNTER — MEDICAL CORRESPONDENCE (OUTPATIENT)
Dept: HEALTH INFORMATION MANAGEMENT | Facility: HOSPITAL | Age: 89
End: 2024-01-03

## 2024-01-30 ENCOUNTER — TELEPHONE (OUTPATIENT)
Dept: FAMILY MEDICINE | Facility: OTHER | Age: 89
End: 2024-01-30

## 2024-01-30 NOTE — TELEPHONE ENCOUNTER
"1:09 PM    Reason for Call: Phone Call    Description:     Was an appointment offered for this call? {YES / NO:[id number]::\"Yes\"}  If yes : Appointment type              Date    Preferred method for responding to this message: {Contact Preference:[id number]}  What is your phone number ?    If we cannot reach you directly, may we leave a detailed response at the number you provided? {YES / NO:[id number]::\"Yes\"}    Can this message wait until your PCP/provider returns, if available today? {:[id number]}    Rashmi Townsend  "

## 2024-01-30 NOTE — TELEPHONE ENCOUNTER
Yomi from [facility] seeking verbal orders for Skilled Nursing Evaluation for 1-31-24 from KHADRA Negrete. Please call Yomi at [phone]. Can leave message

## 2024-01-31 ENCOUNTER — MEDICAL CORRESPONDENCE (OUTPATIENT)
Dept: HEALTH INFORMATION MANAGEMENT | Facility: CLINIC | Age: 89
End: 2024-01-31

## 2024-01-31 ENCOUNTER — TELEPHONE (OUTPATIENT)
Dept: FAMILY MEDICINE | Facility: OTHER | Age: 89
End: 2024-01-31

## 2024-01-31 ENCOUNTER — MEDICAL CORRESPONDENCE (OUTPATIENT)
Dept: HEALTH INFORMATION MANAGEMENT | Facility: HOSPITAL | Age: 89
End: 2024-01-31

## 2024-01-31 DIAGNOSIS — Z53.9 PERSONS ENCOUNTERING HEALTH SERVICES FOR SPECIFIC PROCEDURES, NOT CARRIED OUT: Primary | ICD-10-CM

## 2024-01-31 PROCEDURE — G0180 MD CERTIFICATION HHA PATIENT: HCPCS | Performed by: NURSE PRACTITIONER

## 2024-01-31 NOTE — TELEPHONE ENCOUNTER
2:52 PM    Reason for Call: Phone Call    Description: Looking for home care orders once a week for twos and then every other week for 6 weeks      Preferred method for responding to this message: Telephone  What is your phone number ? [phone] Keisha     If we cannot reach you directly, may we leave a detailed response at the number you provided? No    Can this message wait until your PCP/provider returns, if available today? YES, No

## 2024-02-01 ENCOUNTER — TELEPHONE (OUTPATIENT)
Dept: FAMILY MEDICINE | Facility: OTHER | Age: 89
End: 2024-02-01

## 2024-02-01 NOTE — TELEPHONE ENCOUNTER
3:58 PM    Reason for Call: Phone Call    Description: Ember from [facility] seeking verbal order for occupation therapy 1x a week for 4 weeks.     Was an appointment offered for this call? No  If yes : Appointment type              Date    Preferred method for responding to this message: Telephone Call  What is your phone number ? [phone]    If we cannot reach you directly, may we leave a detailed response at the number you provided? Yes    Can this message wait until your PCP/provider returns, if available today? Doretha Townsend

## 2024-02-01 NOTE — TELEPHONE ENCOUNTER
[facility] calling for orders:    Physical therapy one time per week for eight weeks for therapeutic exercise and activity.    Verbal orders can be returned to [facility] [phone]

## 2024-02-06 NOTE — TELEPHONE ENCOUNTER
Telephone call placed and voice message left on secure voicemail giving verbal order for requested therapies.

## 2024-02-19 ENCOUNTER — TELEPHONE (OUTPATIENT)
Dept: FAMILY MEDICINE | Facility: OTHER | Age: 89
End: 2024-02-19

## 2024-02-19 NOTE — TELEPHONE ENCOUNTER
Call from JEREMY Pa from [facility] requesting VO to continue OT Services 1 x per week x 3 weeks.     Notified ok to continue per Eunice Dias CNP.

## 2024-02-19 NOTE — TELEPHONE ENCOUNTER
Symptom or reason needing to speak to RN: verbal orders/     Best number to return call: [phone]     Best time to return call: ASAP

## 2024-02-20 ENCOUNTER — MEDICAL CORRESPONDENCE (OUTPATIENT)
Dept: HEALTH INFORMATION MANAGEMENT | Facility: HOSPITAL | Age: 89
End: 2024-02-20

## 2024-03-07 DIAGNOSIS — I50.32 HYPERTENSIVE HEART DISEASE WITH CHRONIC DIASTOLIC CONGESTIVE HEART FAILURE (H): ICD-10-CM

## 2024-03-07 DIAGNOSIS — I10 ESSENTIAL HYPERTENSION, BENIGN: ICD-10-CM

## 2024-03-07 DIAGNOSIS — L71.9 ROSACEA: ICD-10-CM

## 2024-03-07 DIAGNOSIS — I11.0 HYPERTENSIVE HEART DISEASE WITH CHRONIC DIASTOLIC CONGESTIVE HEART FAILURE (H): ICD-10-CM

## 2024-03-07 RX ORDER — DOXYCYCLINE 100 MG/1
100 CAPSULE ORAL 2 TIMES DAILY
Qty: 180 CAPSULE | Refills: 1 | Status: SHIPPED | OUTPATIENT
Start: 2024-03-07

## 2024-03-07 RX ORDER — LISINOPRIL 40 MG/1
40 TABLET ORAL DAILY
Qty: 90 TABLET | Refills: 1 | Status: SHIPPED | OUTPATIENT
Start: 2024-03-07 | End: 2024-08-16

## 2024-03-07 RX ORDER — AMLODIPINE BESYLATE 5 MG/1
5 TABLET ORAL DAILY
Qty: 90 TABLET | Refills: 3 | Status: SHIPPED | OUTPATIENT
Start: 2024-03-07

## 2024-03-07 RX ORDER — ATENOLOL 50 MG/1
50 TABLET ORAL DAILY
Qty: 90 TABLET | Refills: 3 | Status: SHIPPED | OUTPATIENT
Start: 2024-03-07

## 2024-03-07 NOTE — TELEPHONE ENCOUNTER
Reason for call:  Medication      Have you contacted your pharmacy? Yes   If patient has contacted Pharmacy and it has been over 72hrs, continue to #2  Medication  Doxycycline 100 Mg, Amlodipine 5 Mg, Atenolol 50 mg, Lisinopril 40 MG.  Patient has run out of refills  What Pharmacy do you use? [facility]      (Please note that the turn-around-time for prescriptions is 72 business hours; I am sending your request at this time. SEND TO  [facility]  )

## 2024-03-11 ENCOUNTER — TELEPHONE (OUTPATIENT)
Dept: FAMILY MEDICINE | Facility: OTHER | Age: 89
End: 2024-03-11

## 2024-03-11 NOTE — TELEPHONE ENCOUNTER
Patient called this writers phone for a call back - wanted to make an appointment but was already scheduled.  Will be in after he finishes physical therapy.

## 2024-04-09 NOTE — PROGRESS NOTES
Assessment & Plan     1. Hypertensive heart disease with chronic diastolic congestive heart failure (H)  - Lipid Profile; Future    2. Chronic diastolic heart failure (H)  - Comprehensive metabolic panel; Future  - TSH with free T4 reflex; Future    3. Muscle weakness (generalized)  Continue home PT exercises     4. Stage 3a chronic kidney disease (H)  - CBC with Platelets & Differential; Future    5. RUMA (generalized anxiety disorder)    Follow-up in 6 months or as needed    The longitudinal plan of care for the diagnosis(es)/condition(s) as documented were addressed during this visit. Due to the added complexity in care, I will continue to support Aron in the subsequent management and with ongoing continuity of care.    Eunice Dias,   Certified Adult Nurse Practitioner  [phone]      Subjective   Aron is a 89 year old, presenting for the following health issues:  Hospital F/U      4/10/2024     2:31 PM   Additional Questions   Roomed by beba   Accompanied by none     HPI     Hypertension Follow-up    Do you check your blood pressure regularly outside of the clinic? Yes   Are you following a low salt diet? Yes  Are your blood pressures ever more than 140 on the top number (systolic) OR more   than 90 on the bottom number (diastolic), for example 140/90? No  Denies weight gain, swelling, chest pain or shortness of breath.     Of note, he was in the hospital due to multiple falls, generalized weakness and covid 19 infection.  He was then transferred to a skilled nursing facility, met all goal and was discharged home with home care.  His home care PT/OT ended last week.  He is feeling well, stronger but continues to use his wheeled walker with seat.      Kidney function stable.     Recent Labs   Lab Test 09/27/23  1456 07/13/23  1435 03/06/23  1645 10/14/21  1036 05/17/21  0925 02/15/21  1022   * 98 103*   < > 89 101*   HDL 42 36* 40   < > 37* 44   TRIG 113 181* 112   < > 94 128   ALT 9 15 11  --   "17 14   CR 1.59* 1.31* 1.41*   < > 1.35* 1.35*   GFRESTIMATED 41* 52* 48*   < > 47* 47*   GFRESTBLACK  --   --   --   --  54* 55*   POTASSIUM 4.5 4.7 4.8   < > 4.3 4.3   TSH 4.14 4.32* 5.15*   < > 4.14* 4.04*    < > = values in this interval not displayed.      BP Readings from Last 3 Encounters:   04/10/24 104/52   09/27/23 122/56   08/07/23 128/62    Wt Readings from Last 3 Encounters:   04/10/24 76.7 kg (169 lb)   09/27/23 83.1 kg (183 lb 1.6 oz)   08/07/23 83.6 kg (184 lb 3.2 oz)                        Review of Systems  Constitutional, neuro, ENT, endocrine, pulmonary, cardiac, gastrointestinal, genitourinary, musculoskeletal, integument and psychiatric systems are negative, except as otherwise noted.      Objective    /52 (BP Location: Right arm, Patient Position: Chair, Cuff Size: Adult Regular)   Pulse 61   Temp 97.9  F (36.6  C) (Tympanic)   Resp 18   Ht 1.753 m (5' 9\")   Wt 76.7 kg (169 lb)   SpO2 96%   BMI 24.96 kg/m    Body mass index is 24.96 kg/m .  Physical Exam   GENERAL: alert, no distress, frail, and elderly  NECK: no adenopathy, no asymmetry, masses, or scars  RESP: lungs clear to auscultation - no rales, rhonchi or wheezes  CV: regular rate and rhythm, normal S1 S2, no S3 or S4, no murmur, click or rub, no peripheral edema  PSYCH: mentation appears normal, affect normal/bright            Signed Electronically by: Eunice Dias NP    "

## 2024-04-10 ENCOUNTER — OFFICE VISIT (OUTPATIENT)
Dept: FAMILY MEDICINE | Facility: OTHER | Age: 89
End: 2024-04-10
Attending: NURSE PRACTITIONER
Payer: MEDICARE

## 2024-04-10 VITALS
WEIGHT: 169 LBS | HEART RATE: 61 BPM | HEIGHT: 69 IN | BODY MASS INDEX: 25.03 KG/M2 | OXYGEN SATURATION: 96 % | RESPIRATION RATE: 18 BRPM | SYSTOLIC BLOOD PRESSURE: 104 MMHG | DIASTOLIC BLOOD PRESSURE: 52 MMHG | TEMPERATURE: 97.9 F

## 2024-04-10 DIAGNOSIS — M62.81 MUSCLE WEAKNESS (GENERALIZED): ICD-10-CM

## 2024-04-10 DIAGNOSIS — N18.31 STAGE 3A CHRONIC KIDNEY DISEASE (H): ICD-10-CM

## 2024-04-10 DIAGNOSIS — I11.0 HYPERTENSIVE HEART DISEASE WITH CHRONIC DIASTOLIC CONGESTIVE HEART FAILURE (H): Primary | ICD-10-CM

## 2024-04-10 DIAGNOSIS — F41.1 GAD (GENERALIZED ANXIETY DISORDER): ICD-10-CM

## 2024-04-10 DIAGNOSIS — I50.32 CHRONIC DIASTOLIC HEART FAILURE (H): ICD-10-CM

## 2024-04-10 DIAGNOSIS — I50.32 HYPERTENSIVE HEART DISEASE WITH CHRONIC DIASTOLIC CONGESTIVE HEART FAILURE (H): Primary | ICD-10-CM

## 2024-04-10 LAB
ALBUMIN SERPL BCG-MCNC: 4.1 G/DL (ref 3.5–5.2)
ALP SERPL-CCNC: 86 U/L (ref 40–150)
ALT SERPL W P-5'-P-CCNC: 10 U/L (ref 0–70)
ANION GAP SERPL CALCULATED.3IONS-SCNC: 12 MMOL/L (ref 7–15)
AST SERPL W P-5'-P-CCNC: 24 U/L (ref 0–45)
BASOPHILS # BLD AUTO: 0 10E3/UL (ref 0–0.2)
BASOPHILS NFR BLD AUTO: 0 %
BILIRUB SERPL-MCNC: 0.3 MG/DL
BUN SERPL-MCNC: 31.1 MG/DL (ref 8–23)
CALCIUM SERPL-MCNC: 9 MG/DL (ref 8.8–10.2)
CHLORIDE SERPL-SCNC: 106 MMOL/L (ref 98–107)
CHOLEST SERPL-MCNC: 175 MG/DL
CREAT SERPL-MCNC: 1.44 MG/DL (ref 0.67–1.17)
DEPRECATED HCO3 PLAS-SCNC: 25 MMOL/L (ref 22–29)
EGFRCR SERPLBLD CKD-EPI 2021: 46 ML/MIN/1.73M2
EOSINOPHIL # BLD AUTO: 0.3 10E3/UL (ref 0–0.7)
EOSINOPHIL NFR BLD AUTO: 4 %
ERYTHROCYTE [DISTWIDTH] IN BLOOD BY AUTOMATED COUNT: 14.2 % (ref 10–15)
FASTING STATUS PATIENT QL REPORTED: NO
GLUCOSE SERPL-MCNC: 105 MG/DL (ref 70–99)
HCT VFR BLD AUTO: 42.1 %
HDLC SERPL-MCNC: 39 MG/DL
HGB BLD-MCNC: 13.5 G/DL
IMM GRANULOCYTES # BLD: 0 10E3/UL
IMM GRANULOCYTES NFR BLD: 0 %
LDLC SERPL CALC-MCNC: 112 MG/DL
LYMPHOCYTES # BLD AUTO: 1.5 10E3/UL (ref 0.8–5.3)
LYMPHOCYTES NFR BLD AUTO: 18 %
MCH RBC QN AUTO: 30.1 PG (ref 26.5–33)
MCHC RBC AUTO-ENTMCNC: 32.1 G/DL (ref 31.5–36.5)
MCV RBC AUTO: 94 FL (ref 78–100)
MONOCYTES # BLD AUTO: 0.9 10E3/UL (ref 0–1.3)
MONOCYTES NFR BLD AUTO: 11 %
NEUTROPHILS # BLD AUTO: 5.5 10E3/UL (ref 1.6–8.3)
NEUTROPHILS NFR BLD AUTO: 68 %
NONHDLC SERPL-MCNC: 136 MG/DL
PLATELET # BLD AUTO: 202 10E3/UL (ref 150–450)
POTASSIUM SERPL-SCNC: 4.9 MMOL/L (ref 3.4–5.3)
PROT SERPL-MCNC: 7.2 G/DL (ref 6.4–8.3)
RBC # BLD AUTO: 4.48 10E6/UL
SODIUM SERPL-SCNC: 143 MMOL/L (ref 135–145)
T4 FREE SERPL-MCNC: 1.1 NG/DL (ref 0.9–1.7)
TRIGL SERPL-MCNC: 122 MG/DL
TSH SERPL DL<=0.005 MIU/L-ACNC: 4.27 UIU/ML (ref 0.3–4.2)
WBC # BLD AUTO: 8.1 10E3/UL (ref 4–11)

## 2024-04-10 PROCEDURE — 99214 OFFICE O/P EST MOD 30 MIN: CPT | Performed by: NURSE PRACTITIONER

## 2024-04-10 PROCEDURE — 80061 LIPID PANEL: CPT | Mod: ZL | Performed by: NURSE PRACTITIONER

## 2024-04-10 PROCEDURE — G2211 COMPLEX E/M VISIT ADD ON: HCPCS | Performed by: NURSE PRACTITIONER

## 2024-04-10 PROCEDURE — 36415 COLL VENOUS BLD VENIPUNCTURE: CPT | Mod: ZL | Performed by: NURSE PRACTITIONER

## 2024-04-10 PROCEDURE — G0463 HOSPITAL OUTPT CLINIC VISIT: HCPCS

## 2024-04-10 PROCEDURE — 85025 COMPLETE CBC W/AUTO DIFF WBC: CPT | Mod: ZL | Performed by: NURSE PRACTITIONER

## 2024-04-10 PROCEDURE — 80053 COMPREHEN METABOLIC PANEL: CPT | Mod: ZL | Performed by: NURSE PRACTITIONER

## 2024-04-10 PROCEDURE — 84443 ASSAY THYROID STIM HORMONE: CPT | Mod: ZL | Performed by: NURSE PRACTITIONER

## 2024-04-10 PROCEDURE — 84439 ASSAY OF FREE THYROXINE: CPT | Mod: ZL | Performed by: NURSE PRACTITIONER

## 2024-04-10 RX ORDER — ACETAMINOPHEN 325 MG/1
650 TABLET ORAL
COMMUNITY

## 2024-04-10 ASSESSMENT — PAIN SCALES - GENERAL: PAINLEVEL: NO PAIN (0)

## 2024-04-10 NOTE — LETTER
April 12, 2024      Aron Colin  [address] [place] [address]        Dear ,    We are writing to inform you of your test results.    Labs are stable     Resulted Orders   Lipid Profile   Result Value Ref Range    Cholesterol 175 <200 mg/dL    Triglycerides 122 <150 mg/dL    Direct Measure HDL 39 (L) >=40 mg/dL    LDL Cholesterol Calculated 112 (H) <=100 mg/dL    Non HDL Cholesterol 136 (H) <130 mg/dL    Patient Fasting > 8hrs? No     Narrative    Cholesterol  Desirable:  <200 mg/dL    Triglycerides  Normal:  Less than 150 mg/dL  Borderline High:  150-199 mg/dL  High:  200-499 mg/dL  Very High:  Greater than or equal to 500 mg/dL    Direct Measure HDL  Female:  Greater than or equal to 50 mg/dL   Male:  Greater than or equal to 40 mg/dL    LDL Cholesterol  Desirable:  <100mg/dL  Above Desirable:  100-129 mg/dL   Borderline High:  130-159 mg/dL   High:  160-189 mg/dL   Very High:  >= 190 mg/dL    Non HDL Cholesterol  Desirable:  130 mg/dL  Above Desirable:  130-159 mg/dL  Borderline High:  160-189 mg/dL  High:  190-219 mg/dL  Very High:  Greater than or equal to 220 mg/dL   Comprehensive metabolic panel   Result Value Ref Range    Sodium 143 135 - 145 mmol/L      Comment:      Reference intervals for this test were updated on 09/26/2023 to more accurately reflect our healthy population. There may be differences in the flagging of prior results with similar values performed with this method. Interpretation of those prior results can be made in the context of the updated reference intervals.     Potassium 4.9 3.4 - 5.3 mmol/L    Carbon Dioxide (CO2) 25 22 - 29 mmol/L    Anion Gap 12 7 - 15 mmol/L    Urea Nitrogen 31.1 (H) 8.0 - 23.0 mg/dL    Creatinine 1.44 (H) 0.67 - 1.17 mg/dL    GFR Estimate 46 (L) >60 mL/min/1.73m2    Calcium 9.0 8.8 - 10.2 mg/dL    Chloride 106 98 - 107 mmol/L    Glucose 105 (H) 70 - 99 mg/dL    Alkaline Phosphatase 86 40 - 150 U/L      Comment:      Reference intervals for this test  were updated on 11/14/2023 to more accurately reflect our healthy population. There may be differences in the flagging of prior results with similar values performed with this method. Interpretation of those prior results can be made in the context of the updated reference intervals.    AST 24 0 - 45 U/L      Comment:      Reference intervals for this test were updated on 6/12/2023 to more accurately reflect our healthy population. There may be differences in the flagging of prior results with similar values performed with this method. Interpretation of those prior results can be made in the context of the updated reference intervals.    ALT 10 0 - 70 U/L      Comment:      Reference intervals for this test were updated on 6/12/2023 to more accurately reflect our healthy population. There may be differences in the flagging of prior results with similar values performed with this method. Interpretation of those prior results can be made in the context of the updated reference intervals.      Protein Total 7.2 6.4 - 8.3 g/dL    Albumin 4.1 3.5 - 5.2 g/dL    Bilirubin Total 0.3 <=1.2 mg/dL   TSH with free T4 reflex   Result Value Ref Range    TSH 4.27 (H) 0.30 - 4.20 uIU/mL   CBC with platelets and differential   Result Value Ref Range    WBC Count 8.1 4.0 - 11.0 10e3/uL    RBC Count 4.48 10e6/uL    Hemoglobin 13.5 g/dL    Hematocrit 42.1 %    MCV 94 78 - 100 fL    MCH 30.1 26.5 - 33.0 pg    MCHC 32.1 31.5 - 36.5 g/dL    RDW 14.2 10.0 - 15.0 %    Platelet Count 202 150 - 450 10e3/uL    % Neutrophils 68 %    % Lymphocytes 18 %    % Monocytes 11 %    % Eosinophils 4 %    % Basophils 0 %    % Immature Granulocytes 0 %    Absolute Neutrophils 5.5 1.6 - 8.3 10e3/uL    Absolute Lymphocytes 1.5 0.8 - 5.3 10e3/uL    Absolute Monocytes 0.9 0.0 - 1.3 10e3/uL    Absolute Eosinophils 0.3 0.0 - 0.7 10e3/uL    Absolute Basophils 0.0 0.0 - 0.2 10e3/uL    Absolute Immature Granulocytes 0.0 <=0.4 10e3/uL   T4 free   Result Value Ref  Range    Free T4 1.10 0.90 - 1.70 ng/dL       If you have any questions or concerns, please call the clinic at the number listed above.       Sincerely,      Eunice Dias NP

## 2024-05-31 ENCOUNTER — TELEPHONE (OUTPATIENT)
Dept: FAMILY MEDICINE | Facility: OTHER | Age: 89
End: 2024-05-31

## 2024-05-31 NOTE — TELEPHONE ENCOUNTER
"Date of ER/UC Visit:  5/27/24    Location: [facility]    Reason for ER/UC Visit:  Anxiety and Generalized weakness    Medication Changes:    Ordered Prescriptions    Ordered Prescriptions  Prescription Sig Dispensed Refills Start Date End Date   hydrOXYzine HCl (Atarax) 25 MG tablet  Take 1 Tablet by mouth three times a day as needed for Anxiety. 20 Tablet    05/27/2024     sertraline (Zoloft) 50 MG tablet  Take 1 Tablet by mouth one time a day for 30 days. 30 Tablet    05/27/2024 06/26/2024       Medication picked up/started: Yes.    Symptoms improved or worsened: Patient states he is doing much better.  He is not currently experiencing anxiety.  States Atarax \"knocked me on my butt\" and feels it is too strong.  Advised patient that if he needs to use the medication, be sure to be where he is able to rest after taking the medication.    ER/UC follow up recommended:    Discharge Instructions  Audra Muller APRN, CNP - 05/27/2024 1:08 PM CDT   Formatting of this note might be different from the original.  Please take half a tablet of sertraline for 3 days, then take a full tablet daily.  This medication will take about a month to start working. In the meantime, I would use Atarax 3 times a day as needed. Both of these medications were sent to [facility].  The anxiety medication can cause over sedation, constipation and urinary retention. Watch for these symptoms.  Your work up today is reassuring.  Focus more on the way your feeling and not on your blood pressure numbers.  Return for any concerns and please follow up with Marlon Negrete this week.  Patient states he has transportation available on [address] 10 or 11th and requests appointment at that time.  Patient advised to call sooner if anxiety or concerns arise before scheduled appointment.    Questions/concern: No refills on sertraline, Atarax is too strong    Appointment Scheduled:      OVER BOOK REQUESTED    Future Appointments 5/31/2024 - 11/27/2024 "        Date Visit Type Length Department Provider     6/10/2024  9:45 PM OFFICE VISIT 30 min [facility] Aileen-Eunice Negrete, NP    Location Instructions:     From [place] - follow [address]nbsp; Take a right at the intersection across from [employer].&nbsp; The clinic will be on your right.  From [place] - follow [address]nbsp; Take a right onto [address].&nbsp; Take a left at the intersection across from [employer].&nbsp; The clinic will be on your right.  From [place] - follow [address]nbsp; Take a left onto [address].&nbsp; Take a left at the intersection across from [employer].&nbsp; The clinic will be on your right.                   Telephone call returned to notify patient of appointment scheduled.  No answer, message left for patient to return call to clinic for appointment time or will attempt to notify later today.

## 2024-05-31 NOTE — TELEPHONE ENCOUNTER
Symptom or reason needing to speak to RN: needs uc follow up     Best number to return call: [phone]     Best time to return call: asap

## 2024-06-06 NOTE — PROGRESS NOTES
"  Assessment & Plan     1. RUMA (generalized anxiety disorder)  Continue zoloft.   - sertraline (ZOLOFT) 50 MG tablet; Take 1 tablet (50 mg) by mouth daily  - Primary Care - Care Coordination Referral  - Home Care Referral    2. Generalized muscle weakness  - Primary Care - Care Coordination Referral  - Home Care Referral    3. Hypertensive heart disease with chronic diastolic congestive heart failure (H)  - Primary Care - Care Coordination Referral  - Home Care Referral    4. Chronic diastolic heart failure (H)  - Primary Care - Care Coordination Referral  - Home Care Referral    5. Stage 3a chronic kidney disease (H)  - Primary Care - Care Coordination Referral  - Home Care Referral    6. Unsteady gait  - Primary Care - Care Coordination Referral  - Home Care Referral          MED REC REQUIRED  Post Medication Reconciliation Status: discharge medications reconciled and changed, per note/orders  BMI  Estimated body mass index is 26.24 kg/m  as calculated from the following:    Height as of this encounter: 1.753 m (5' 9\").    Weight as of this encounter: 80.6 kg (177 lb 11.2 oz).     Follow-up in 3 weeks or as needed     The longitudinal plan of care for the diagnosis(es)/condition(s) as documented were addressed during this visit. Due to the added complexity in care, I will continue to support Aron in the subsequent management and with ongoing continuity of care.    Eunice Dias,   Certified Adult Nurse Practitioner  [phone]      Subjective   Aron is a 89 year old, presenting for the following health issues:  ER F/U and Anxiety    HPI   Aron is here today with his Daughter Courtney.      ED/UC Followup:    Facility:  [facility]   Date of visit: 5/27/24  Reason for visit: Generalized weakness and anxiety   Current Status: improving, still a headache    Was prescribed zoloft, started it and is tolerating well.  He was also prescribed hydroxyzine.      He notes overall generalized weakness.  He was " "hospitalized for covid, weakness and multiple falls in January.  He went to short term rehab then home.  He had homecare for a short period of time to help continue therapy for weakness.      Upon discharge, \"they\" were supposed to assist with hand rails, shower grab bars and other safety item in the home.  None of was every completed.  It sounds as he now hired someone to come in and place them..    He is getting weaker, uses his walker all the time now.  Difficulty rising from seated position.  He has not had any additional falls.  The family would like to see if he will continue to qualify for home care and therapy.      Family notes increased memory changes and forgetfulness.      Other chronic conditions include anxiety, hypertensive heart disease, chronic kidney disease.          Documentation of Face-to-Face and Certification for Home Health Services     Patient: Enzo Colin   YOB: 1934  MR Number: [id number]  Today's Date: 6/10/2024    I certify that patient: Enzo Colin is under my care and that I, or a nurse practitioner or physician's assistant working with me, had a face-to-face encounter that meets the physician face-to-face encounter requirements with this patient on: Rosalva 10, 2024.    This encounter with the patient was in whole, or in part, for the following medical condition, which is the primary reason for home health care: generalized weakness, multiple falls, memory changes.    I certify that, based on my findings, the following services are medically necessary home health services: Nursing, Occupational Therapy, Physical Therapy, and Social Work.    My clinical findings support the need for the above services because: Nurse is needed: To provide assessment and oversight required in the home to assure adherence to the medical plan due to: memory changes.., Occupational Therapy Services are needed to assess and treat cognitive ability and address ADL safety due to impairment in " "memory and generalized weakness., and Physical Therapy Services are needed to assess and treat the following functional impairments: generalized weakness. Due to chronic heart failure.     Further, I certify that my clinical findings support that this patient is homebound (i.e. absences from home require considerable and taxing effort and are for medical reasons or [employer] services or infrequently or of short duration when for other reasons) because: Requires assistance of another person or specialized equipment to access medical services because patient: Range of motion limitations prevents ability to exit home safely...    Based on the above findings. I certify that this patient is confined to the home and needs intermittent skilled nursing care, physical therapy and/or speech therapy.  The patient is under my care, and I have initiated the establishment of the plan of care.  This patient will be followed by a physician who will periodically review the plan of care.  Physician/Provider to provide follow up care: Eunice Dias    Responsible Medicare certified PECOS Physician:   Eunice Dias,   Certified Adult Nurse Practitioner  [phone]    Physician Signature: See electronic signature associated with these discharge orders.  Date: 6/10/2024       Review of Systems  Constitutional, neuro, ENT, endocrine, pulmonary, cardiac, gastrointestinal, genitourinary, musculoskeletal, integument and psychiatric systems are negative, except as otherwise noted.      Objective    /62 (BP Location: Left arm, Patient Position: Chair, Cuff Size: Adult Regular)   Pulse 53   Temp 97.7  F (36.5  C) (Tympanic)   Resp 16   Ht 1.753 m (5' 9\")   Wt 80.6 kg (177 lb 11.2 oz)   SpO2 99%   BMI 26.24 kg/m    Body mass index is 26.24 kg/m .  Physical Exam   GENERAL: alert, no distress, frail, and elderly  RESP: lungs clear to auscultation - no rales, rhonchi or wheezes  CV: regular rate and rhythm, normal " S1 S2, no S3 or S4, no murmur, click or rub, no peripheral edema  MS: no gross musculoskeletal defects noted, no edema  PSYCH: mentation appears normal, affect normal/bright            Signed Electronically by: Eunice Dias, NP

## 2024-06-10 ENCOUNTER — OFFICE VISIT (OUTPATIENT)
Dept: FAMILY MEDICINE | Facility: OTHER | Age: 89
End: 2024-06-10
Attending: NURSE PRACTITIONER
Payer: MEDICARE

## 2024-06-10 VITALS
TEMPERATURE: 97.7 F | HEART RATE: 53 BPM | WEIGHT: 177.7 LBS | BODY MASS INDEX: 26.32 KG/M2 | RESPIRATION RATE: 16 BRPM | DIASTOLIC BLOOD PRESSURE: 62 MMHG | OXYGEN SATURATION: 99 % | SYSTOLIC BLOOD PRESSURE: 124 MMHG | HEIGHT: 69 IN

## 2024-06-10 DIAGNOSIS — I50.32 CHRONIC DIASTOLIC HEART FAILURE (H): ICD-10-CM

## 2024-06-10 DIAGNOSIS — R41.3 MEMORY CHANGES: ICD-10-CM

## 2024-06-10 DIAGNOSIS — R26.81 UNSTEADY GAIT: ICD-10-CM

## 2024-06-10 DIAGNOSIS — I11.0 HYPERTENSIVE HEART DISEASE WITH CHRONIC DIASTOLIC CONGESTIVE HEART FAILURE (H): ICD-10-CM

## 2024-06-10 DIAGNOSIS — N18.31 STAGE 3A CHRONIC KIDNEY DISEASE (H): ICD-10-CM

## 2024-06-10 DIAGNOSIS — M62.81 GENERALIZED MUSCLE WEAKNESS: ICD-10-CM

## 2024-06-10 DIAGNOSIS — I50.32 HYPERTENSIVE HEART DISEASE WITH CHRONIC DIASTOLIC CONGESTIVE HEART FAILURE (H): ICD-10-CM

## 2024-06-10 DIAGNOSIS — F41.1 GAD (GENERALIZED ANXIETY DISORDER): Primary | ICD-10-CM

## 2024-06-10 PROCEDURE — 99214 OFFICE O/P EST MOD 30 MIN: CPT | Performed by: NURSE PRACTITIONER

## 2024-06-10 PROCEDURE — G0463 HOSPITAL OUTPT CLINIC VISIT: HCPCS

## 2024-06-10 PROCEDURE — G2211 COMPLEX E/M VISIT ADD ON: HCPCS | Performed by: NURSE PRACTITIONER

## 2024-06-10 ASSESSMENT — PAIN SCALES - GENERAL: PAINLEVEL: NO PAIN (0)

## 2024-06-13 ENCOUNTER — DOCUMENTATION ONLY (OUTPATIENT)
Dept: CARE COORDINATION | Facility: CLINIC | Age: 89
End: 2024-06-13

## 2024-06-13 NOTE — PROGRESS NOTES
Received referral for SN and PT.  Unable to accept this referral due to being unable to service area where client resided as a result of shortage of therapy staff. Please refer to another agency. [facility], and [facility] are agencies that service that area.   Thank you for this referral.

## 2024-06-19 ENCOUNTER — TELEPHONE (OUTPATIENT)
Dept: FAMILY MEDICINE | Facility: OTHER | Age: 89
End: 2024-06-19

## 2024-06-21 ENCOUNTER — TELEPHONE (OUTPATIENT)
Dept: FAMILY MEDICINE | Facility: OTHER | Age: 89
End: 2024-06-21

## 2024-06-21 DIAGNOSIS — F41.1 GAD (GENERALIZED ANXIETY DISORDER): Primary | ICD-10-CM

## 2024-06-21 RX ORDER — HYDROXYZINE HYDROCHLORIDE 25 MG/1
25 TABLET, FILM COATED ORAL 3 TIMES DAILY PRN
Qty: 30 TABLET | Refills: 0 | Status: SHIPPED | OUTPATIENT
Start: 2024-06-21 | End: 2024-06-25

## 2024-06-21 RX ORDER — HYDROXYZINE HYDROCHLORIDE 25 MG/1
25 TABLET, FILM COATED ORAL 3 TIMES DAILY PRN
COMMUNITY
End: 2024-06-21

## 2024-06-21 NOTE — TELEPHONE ENCOUNTER
3:06 PM    Reason for Call: Phone Call    Description: [facility] home care ([facility]) called to request addendum to appt notes for 06/10 in order to have visit qualify for medicare. Requests that heart failure could be listed if that is what caused the weakness.     Was an appointment offered for this call? No  If yes : Appointment type              Date    Preferred method for responding to this message: Telephone Call  What is your phone number? [phone] - [facility]    If we cannot reach you directly, may we leave a detailed response at the number you provided? Yes    Can this message wait until your PCP/provider returns, if available today? Not applicable    Sarahi Grubbs

## 2024-06-21 NOTE — PROGRESS NOTES
Homecare referral faxed to [facility] and [facility]. Yonas Garza states they are not accepting referrals at this time due to staffing.

## 2024-06-21 NOTE — TELEPHONE ENCOUNTER
Reason for call:  Medication      Have you contacted your pharmacy? No   - PATIENT WAS SEEN IN URGENT CARE AND GIVEN THIS MEDICATION - PATIENT HAS AN ORDER COMING FROM [facility] BUT IS ALMOST OUT AND NEEDS A BRIDGE  Medication HYDROXYZINE HCL 25 MG  What Pharmacy do you use? [facility]      (Please note that the turn-around-time for prescriptions is 72 business hours; I am sending your request at this time. SEND TO appropriate Care Team Pool )

## 2024-06-21 NOTE — TELEPHONE ENCOUNTER
I used this diagnosis (among others)    3. Hypertensive heart disease with chronic diastolic congestive heart failure (H)  - Primary Care - Care Coordination Referral  - Home Care Referral    Heart failure is already listed, not sure what they are requring.

## 2024-06-24 ENCOUNTER — MEDICAL CORRESPONDENCE (OUTPATIENT)
Dept: HEALTH INFORMATION MANAGEMENT | Facility: CLINIC | Age: 89
End: 2024-06-24

## 2024-06-24 DIAGNOSIS — Z53.9 PERSONS ENCOUNTERING HEALTH SERVICES FOR SPECIFIC PROCEDURES, NOT CARRIED OUT: Primary | ICD-10-CM

## 2024-06-24 DIAGNOSIS — F41.1 GAD (GENERALIZED ANXIETY DISORDER): ICD-10-CM

## 2024-06-24 PROCEDURE — G0180 MD CERTIFICATION HHA PATIENT: HCPCS | Performed by: NURSE PRACTITIONER

## 2024-06-24 RX ORDER — HYDROXYZINE HYDROCHLORIDE 25 MG/1
25 TABLET, FILM COATED ORAL 3 TIMES DAILY PRN
Qty: 270 TABLET | Refills: 0 | OUTPATIENT
Start: 2024-06-24

## 2024-06-24 NOTE — TELEPHONE ENCOUNTER
Sertraline 50mg      Routing refill request to provider for review/approval because:    [facility] Protocol [id number]/24/2024 08:22 AM   Protocol Details RUMA-7 score of less than 5 in past 6 months.            6/7/2018     3:00 PM 3/6/2023     3:00 PM 9/27/2023     2:00 PM   RUMA-7 SCORE   Total Score 0 0 0

## 2024-06-24 NOTE — TELEPHONE ENCOUNTER
Hydroxyzine  Last Written Prescription Date: 6/21/24  Last Fill Quantity: 30 # of Refills: 0  Last Office Visit: 6/10/24       Zoloft  Last Written Prescription Date: not prescribed  Last Fill Quantity: ? # of Refills: ?  Last Office Visit: 6/10/24       Requesting 90 day supply

## 2024-06-26 ENCOUNTER — PATIENT OUTREACH (OUTPATIENT)
Dept: CARE COORDINATION | Facility: OTHER | Age: 89
End: 2024-06-26

## 2024-06-26 ENCOUNTER — TELEPHONE (OUTPATIENT)
Dept: FAMILY MEDICINE | Facility: OTHER | Age: 89
End: 2024-06-26

## 2024-06-26 RX ORDER — HYDROXYZINE HYDROCHLORIDE 25 MG/1
25 TABLET, FILM COATED ORAL 3 TIMES DAILY PRN
Qty: 270 TABLET | Refills: 0 | Status: SHIPPED | OUTPATIENT
Start: 2024-06-26

## 2024-06-26 NOTE — TELEPHONE ENCOUNTER
Clinic Care Coordination Contact  Care Team Conversations    Patient referred for Homecare Services- needing SN and PT.  Referral sent to [facility]- unable to accept due to patient being outside of service area.    Referrals were sent to [facility], [facility], [facility].  Per note on 6/21/24- [facility] requesting more information.  Face to Face was re-sent to [facility].    Received a phone call today from Hilary Soares RN with [facility].  Patient accepted and is requesting orders to start services.  Will route verbal order to Marlon Dias NP. Contact number for Hilary [phone]    Telephone call placed to patient.  Patient states the nurse from [facility] spent 3 hours with him at his house yesterday. Explained that they have requested orders to start visits with him in his home and [facility] will be in contact with him to schedule those appointments.      Call back number/contact name provided to patient to call back with any further care coordination needs or questions.

## 2024-06-26 NOTE — TELEPHONE ENCOUNTER
Symptom or reason needing to speak to RN: Verbal Orders     Best number to return call: [phone]      Best time to return call: Anytime

## 2024-06-26 NOTE — TELEPHONE ENCOUNTER
Telephone call received from Hilary Soares RN for [facility] Requesting verbal orders for skilled nursing 1x per week x 5 week, then every other week x 3 weeks. Focus of Care: Heart Failure, medications, teaching.    Call can be returned to secure line:[phone].

## 2024-06-28 ENCOUNTER — TELEPHONE (OUTPATIENT)
Dept: FAMILY MEDICINE | Facility: OTHER | Age: 89
End: 2024-06-28

## 2024-06-28 NOTE — TELEPHONE ENCOUNTER
Telephone call received from Ronny NARANJO from [facility] for [facility] requesting verbal orders for PT orders for 1 time a week for 6 weeks.    Call can be returned to secure line: [phone].

## 2024-06-28 NOTE — TELEPHONE ENCOUNTER
Call placed to [facility] and informed vale that the PT orders requested have been approved by PCP.

## 2024-07-02 NOTE — PROGRESS NOTES
"  Assessment & Plan     1. RUMA (generalized anxiety disorder)  Continue zoloft 50mg daily  Lorazepam as needed for acute anxiety    2. Generalized muscle weakness  Continue home physical therapy    3. Hypertensive heart disease with chronic diastolic congestive heart failure (H)  Stable  Continue home care/physical therapy.     4. Stage 3a chronic kidney disease (H)  stable        BMI  Estimated body mass index is 26.4 kg/m  as calculated from the following:    Height as of this encounter: 1.753 m (5' 9\").    Weight as of this encounter: 81.1 kg (178 lb 12.8 oz).         Follow-up in 3 months or as needed      The longitudinal plan of care for the diagnosis(es)/condition(s) as documented were addressed during this visit. Due to the added complexity in care, I will continue to support Aron in the subsequent management and with ongoing continuity of care.    Eunice Dias,   Certified Adult Nurse Practitioner  [phone]      Subjective   Aron is a 89 year old, presenting for the following health issues:  Anxiety        2024     8:11 AM   Additional Questions   Roomed by beba   Accompanied by none     HPI     Anxiety   How are you doing with your anxiety since your last visit? Improved - has been taking zoloft daily.  Hydroxyzine daily and ativan only as needed   Are you having other symptoms that might be associated with anxiety? No  Have you had a significant life event? Health Concerns Can't do the things used to do and having issues with one of his sons youngest   Are you feeling depressed? No  Do you have any concerns with your use of alcohol or other drugs? No    Social History     Tobacco Use    Smoking status: Former     Current packs/day: 0.00     Types: Cigarettes     Quit date:      Years since quittin.5     Passive exposure: Past    Smokeless tobacco: Former     Quit date: 2001   Vaping Use    Vaping status: Never Used   Substance Use Topics    Alcohol use: No    Drug use: No         " 3/6/2023     3:00 PM 9/27/2023     2:00 PM 7/8/2024     8:00 AM   RUMA-7 SCORE   Total Score 0 0 3         4/11/2018    11:00 AM 6/7/2018     3:00 PM 3/6/2023     3:00 PM   PHQ   PHQ-9 Total Score 0 2 0   Q9: Thoughts of better off dead/self-harm past 2 weeks Not at all Not at all Not at all         7/8/2024     8:00 AM   RUMA-7    1. Feeling nervous, anxious, or on edge 0   2. Not being able to stop or control worrying 0   3. Worrying too much about different things 3   4. Trouble relaxing 0   5. Being so restless that it is hard to sit still 0   6. Becoming easily annoyed or irritable 0   7. Feeling afraid, as if something awful might happen 0   RUMA-7 Total Score 3   If you checked any problems, how difficult have they made it for you to do your work, take care of things at home, or get along with other people? Not difficult at all       Weakness/ heart failure - stable.  Cannot do the things he used to be able to do.  He has started home physical therapy.  He has been doing the exercises daily.  No falls since last visit.      Recent Labs   Lab Test 04/10/24  1524 09/27/23  1456 07/13/23  1435 10/14/21  1036 05/17/21  0925 02/15/21  1022   * 107* 98   < > 89 101*   HDL 39* 42 36*   < > 37* 44   TRIG 122 113 181*   < > 94 128   ALT 10 9 15   < > 17 14   CR 1.44* 1.59* 1.31*   < > 1.35* 1.35*   GFRESTIMATED 46* 41* 52*   < > 47* 47*   GFRESTBLACK  --   --   --   --  54* 55*   POTASSIUM 4.9 4.5 4.7   < > 4.3 4.3   TSH 4.27* 4.14 4.32*   < > 4.14* 4.04*    < > = values in this interval not displayed.      BP Readings from Last 3 Encounters:   07/08/24 122/62   06/10/24 124/62   04/10/24 104/52    Wt Readings from Last 3 Encounters:   07/08/24 81.1 kg (178 lb 12.8 oz)   06/10/24 80.6 kg (177 lb 11.2 oz)   04/10/24 76.7 kg (169 lb)               Review of Systems  Constitutional, neuro, ENT, endocrine, pulmonary, cardiac, gastrointestinal, genitourinary, musculoskeletal, integument and psychiatric systems are  "negative, except as otherwise noted.      Objective    /62 (BP Location: Left arm, Patient Position: Chair, Cuff Size: Adult Regular)   Pulse 57   Temp 97.6  F (36.4  C) (Tympanic)   Resp 16   Ht 1.753 m (5' 9\")   Wt 81.1 kg (178 lb 12.8 oz)   SpO2 98%   BMI 26.40 kg/m    Body mass index is 26.4 kg/m .  Physical Exam   GENERAL: alert and no distress  RESP: lungs clear to auscultation - no rales, rhonchi or wheezes  CV: regular rate and rhythm, normal S1 S2, no S3 or S4, no murmur, click or rub, no peripheral edema  MS: using wheeled walker with seat with ambulation.    PSYCH: mentation appears normal, affect normal/bright          Signed Electronically by: Eunice Dias NP    "

## 2024-07-08 ENCOUNTER — OFFICE VISIT (OUTPATIENT)
Dept: FAMILY MEDICINE | Facility: OTHER | Age: 89
End: 2024-07-08
Attending: NURSE PRACTITIONER
Payer: MEDICARE

## 2024-07-08 VITALS
OXYGEN SATURATION: 98 % | HEIGHT: 69 IN | WEIGHT: 178.8 LBS | RESPIRATION RATE: 16 BRPM | HEART RATE: 57 BPM | BODY MASS INDEX: 26.48 KG/M2 | DIASTOLIC BLOOD PRESSURE: 62 MMHG | TEMPERATURE: 97.6 F | SYSTOLIC BLOOD PRESSURE: 122 MMHG

## 2024-07-08 DIAGNOSIS — I50.32 HYPERTENSIVE HEART DISEASE WITH CHRONIC DIASTOLIC CONGESTIVE HEART FAILURE (H): ICD-10-CM

## 2024-07-08 DIAGNOSIS — F41.1 GAD (GENERALIZED ANXIETY DISORDER): Primary | ICD-10-CM

## 2024-07-08 DIAGNOSIS — N18.31 STAGE 3A CHRONIC KIDNEY DISEASE (H): ICD-10-CM

## 2024-07-08 DIAGNOSIS — M62.81 GENERALIZED MUSCLE WEAKNESS: ICD-10-CM

## 2024-07-08 DIAGNOSIS — I11.0 HYPERTENSIVE HEART DISEASE WITH CHRONIC DIASTOLIC CONGESTIVE HEART FAILURE (H): ICD-10-CM

## 2024-07-08 PROCEDURE — 99214 OFFICE O/P EST MOD 30 MIN: CPT | Performed by: NURSE PRACTITIONER

## 2024-07-08 PROCEDURE — G2211 COMPLEX E/M VISIT ADD ON: HCPCS | Performed by: NURSE PRACTITIONER

## 2024-07-08 PROCEDURE — G0463 HOSPITAL OUTPT CLINIC VISIT: HCPCS

## 2024-07-08 ASSESSMENT — PAIN SCALES - GENERAL: PAINLEVEL: NO PAIN (0)

## 2024-07-08 ASSESSMENT — ANXIETY QUESTIONNAIRES
IF YOU CHECKED OFF ANY PROBLEMS ON THIS QUESTIONNAIRE, HOW DIFFICULT HAVE THESE PROBLEMS MADE IT FOR YOU TO DO YOUR WORK, TAKE CARE OF THINGS AT HOME, OR GET ALONG WITH OTHER PEOPLE: NOT DIFFICULT AT ALL
4. TROUBLE RELAXING: NOT AT ALL
1. FEELING NERVOUS, ANXIOUS, OR ON EDGE: NOT AT ALL
7. FEELING AFRAID AS IF SOMETHING AWFUL MIGHT HAPPEN: NOT AT ALL
2. NOT BEING ABLE TO STOP OR CONTROL WORRYING: NOT AT ALL
GAD7 TOTAL SCORE: 3
GAD7 TOTAL SCORE: 3
5. BEING SO RESTLESS THAT IT IS HARD TO SIT STILL: NOT AT ALL
3. WORRYING TOO MUCH ABOUT DIFFERENT THINGS: NEARLY EVERY DAY
6. BECOMING EASILY ANNOYED OR IRRITABLE: NOT AT ALL

## 2024-07-08 NOTE — PATIENT INSTRUCTIONS
Assessment & Plan     1. RUMA (generalized anxiety disorder)  Continue zoloft 50mg daily  Lorazepam as needed for acute anxiety    2. Generalized muscle weakness  Continue home physical therapy    3. Hypertensive heart disease with chronic diastolic congestive heart failure (H)  stable    4. Stage 3a chronic kidney disease (H)  stable    Follow-up in 3 months or as needed    Eunice Dias,   Certified Adult Nurse Practitioner  [phone]

## 2024-07-24 ENCOUNTER — MEDICAL CORRESPONDENCE (OUTPATIENT)
Dept: HEALTH INFORMATION MANAGEMENT | Facility: HOSPITAL | Age: 89
End: 2024-07-24

## 2024-07-30 ENCOUNTER — MEDICAL CORRESPONDENCE (OUTPATIENT)
Dept: HEALTH INFORMATION MANAGEMENT | Facility: CLINIC | Age: 89
End: 2024-07-30

## 2024-08-01 ENCOUNTER — MEDICAL CORRESPONDENCE (OUTPATIENT)
Dept: HEALTH INFORMATION MANAGEMENT | Facility: CLINIC | Age: 89
End: 2024-08-01

## 2024-08-16 DIAGNOSIS — I10 ESSENTIAL HYPERTENSION, BENIGN: ICD-10-CM

## 2024-08-16 RX ORDER — LISINOPRIL 40 MG/1
40 TABLET ORAL DAILY
Qty: 90 TABLET | Refills: 0 | Status: SHIPPED | OUTPATIENT
Start: 2024-08-16

## 2024-08-16 NOTE — TELEPHONE ENCOUNTER
Lisinopril      Last Written Prescription Date:  3/7/24  Last Fill Quantity: 90,   # refills: 1  Last Office Visit: 7/8/24  Future Office visit:    Next 5 appointments (look out 90 days)      Oct 09, 2024 9:30 AM  (Arrive by 9:15 AM)  Adult Preventative Visit with Eunice Dias NP  [facility] ([facility] ) [address]  [facility] [address]  [phone]             Routing refill request to provider for review/approval because:

## 2024-08-16 NOTE — TELEPHONE ENCOUNTER
ACE Inhibitors (Including Combos) Protocol Failed      Has GFR on file in past 12 months and most recent value is normal

## 2024-08-19 DIAGNOSIS — L71.9 ROSACEA: ICD-10-CM

## 2024-08-19 RX ORDER — TRETINOIN 0.1 MG/G
GEL TOPICAL AT BEDTIME
Qty: 135 G | Refills: 3 | Status: SHIPPED | OUTPATIENT
Start: 2024-08-19

## 2024-08-19 NOTE — TELEPHONE ENCOUNTER
Retin A  Last Written Prescription Date: 10/27/22  Last Fill Quantity: 135 g # of Refills: 1  Last Office Visit: 7/8/24    Metrocream  Last Written Prescription Date: 3/6/23  Last Fill Quantity: 45 g # of Refills: 3  Last Office Visit: 7/8/24

## 2024-09-11 ENCOUNTER — PATIENT OUTREACH (OUTPATIENT)
Dept: CARE COORDINATION | Facility: OTHER | Age: 89
End: 2024-09-11

## 2024-09-11 NOTE — PROGRESS NOTES
Clinic Care Coordination Contact  Care Team Conversations    Telephone call placed to patient to check in on how home care services went.  He states he received some therapy and that has been completed for about a month.  He reports that he is up walking but his legs are still weak.  He states that he was advised by homecare to let them know if he felt he was needing some help again.  Patient has no concerns or requests at this time.

## 2024-10-25 ENCOUNTER — TELEPHONE (OUTPATIENT)
Dept: FAMILY MEDICINE | Facility: OTHER | Age: 89
End: 2024-10-25

## 2024-10-25 NOTE — TELEPHONE ENCOUNTER
Patient called in returning Jennifer's phone call and said to tell her that the problem is taken care of and no need to call him back.

## 2024-10-30 ENCOUNTER — TELEPHONE (OUTPATIENT)
Dept: FAMILY MEDICINE | Facility: OTHER | Age: 89
End: 2024-10-30

## 2024-10-30 NOTE — TELEPHONE ENCOUNTER
Pt states he has some cold symptoms and he did not feel that he wanted to share them.  Pt still declines going to the ER.   Pt was pleasant and did not sound confused.  Pt did sound congested but states that he has no other symptoms.

## 2024-10-30 NOTE — TELEPHONE ENCOUNTER
Patient called in to reschedule his wellness exam as he stated he didn't want to come in today as he wasn't feeling well and that had a headache. Patient asked to speak directly with Marlon Negrete or her nurse to explain why he was rescheduling his appointment. Patient has called in every day since 10-23-24, 2 or more times a day,  to either check on his upcomming appointment or reschedule to a different time of day.  spoke with Marlon as the patient's insistence to speak with her or a nurse was out of the norm for the patient as he is generally easy going. Marlon said to tell the patient he needed to go to the ER or Urgent Care in order to get checked out.     relayed the message to the patient and the patient, who is normally very calm and easy going, became aggravated and agitated with . The patient stated that he knows his body, that his headache was sinus related and that if he took a pill it would go away, that his blood pressure was fine, that he had some anxiety and had medication for that, and that he was comfortable in his chair and didn't want to come in. Patient asked again to speak with Marlon or her nurse to explain why he wasn't coming in.  told patient that she would speak with Marlon and let her know as to why he wasn't going to the ED or UC.  Patient stated again that he knows his body and that he didn't want to go to the ED/UC, be told again there's nothing wrong with him, and wind up with another $700 bill.     Patient's wellness exam has been rescheduled for 11-29-24.      spoke again with Marlon about the exchange and Marlon asked that a nurse/Liz call the patient to try to convince him to go to the ED/UC.

## 2024-11-14 DIAGNOSIS — I10 ESSENTIAL HYPERTENSION, BENIGN: ICD-10-CM

## 2024-11-14 RX ORDER — LISINOPRIL 40 MG/1
40 TABLET ORAL DAILY
Qty: 90 TABLET | Refills: 3 | Status: SHIPPED | OUTPATIENT
Start: 2024-11-14

## 2024-12-02 DIAGNOSIS — F41.1 GAD (GENERALIZED ANXIETY DISORDER): ICD-10-CM

## 2025-03-06 DIAGNOSIS — I10 ESSENTIAL HYPERTENSION, BENIGN: ICD-10-CM

## 2025-03-06 DIAGNOSIS — F41.1 GAD (GENERALIZED ANXIETY DISORDER): ICD-10-CM

## 2025-03-06 DIAGNOSIS — I50.32 HYPERTENSIVE HEART DISEASE WITH CHRONIC DIASTOLIC CONGESTIVE HEART FAILURE (H): ICD-10-CM

## 2025-03-06 DIAGNOSIS — I11.0 HYPERTENSIVE HEART DISEASE WITH CHRONIC DIASTOLIC CONGESTIVE HEART FAILURE (H): ICD-10-CM

## 2025-03-06 RX ORDER — ATENOLOL 50 MG/1
50 TABLET ORAL DAILY
Qty: 90 TABLET | Refills: 1 | Status: SHIPPED | OUTPATIENT
Start: 2025-03-06

## 2025-03-06 RX ORDER — LISINOPRIL 40 MG/1
40 TABLET ORAL DAILY
Qty: 90 TABLET | Refills: 3 | OUTPATIENT
Start: 2025-03-06

## 2025-03-06 RX ORDER — AMLODIPINE BESYLATE 5 MG/1
5 TABLET ORAL DAILY
Qty: 90 TABLET | Refills: 3 | Status: SHIPPED | OUTPATIENT
Start: 2025-03-06

## 2025-03-06 NOTE — TELEPHONE ENCOUNTER
Reason for call:  Medication      Have you contacted your pharmacy? Yes   If patient has contacted Pharmacy and it has been over 72hrs, continue to #2  Medication atenolol (TENORMIN) 50 MG tablet  , sertraline (ZOLOFT) 50 MG tablet , lisinopril (ZESTRIL) 40 MG tablet , amLODIPine (NORVASC) 5 MG tablet   What Pharmacy do you use?   [facility] - [facility], [place] - [facility]       (Please note that the turn-around-time for prescriptions is 72 business hours; I am sending your request at this time. SEND TO appropriate Care Team Pool )

## 2025-03-06 NOTE — TELEPHONE ENCOUNTER
sertraline (ZOLOFT) 50 MG tablet       Last Written Prescription Date:  12/2/24  Last Fill Quantity: 90,   # refills: 1  Last Office Visit: 11/29/24  Future Office visit:    Next 5 appointments (look out 90 days)      Mar 31, 2025 1:30 PM  (Arrive by 1:15 PM)  Provider Visit with Eunice Dias NP  [facility] ([facility] ) [address] [facility] [address]  [phone]         Routing refill request to provider for review/approval because:    SSRIs Protocol [id number]/06/2025 10:18 AM   Protocol Details RUMA-7 score of less than 5 in past 6 months.            3/6/2023     3:00 PM 9/27/2023     2:00 PM 7/8/2024     8:00 AM   RUMA-7 SCORE   Total Score 0 0 3       amLODIPine (NORVASC) 5 MG tablet       Last Written Prescription Date:  3/7/24  Last Fill Quantity: 90,   # refills: 3  Last Office Visit: 11/29/24  Routing refill request to provider for review/approval because:    Calcium Channel Blockers Protocol  [id number]/06/2025 10:18 AM   Protocol Details GFR is on file in the past 12 months and most recent GFR is normal        GFR Estimate   Date Value Ref Range Status   11/29/2024 39 (L) >60 mL/min/1.73m2 Final     Comment:     eGFR calculated using 2021 CKD-EPI equation.   05/17/2021 47 (L) >60 mL/min/[1.73_m2] Final     Comment:     Non  GFR Calc  Starting 12/18/2018, serum creatinine based estimated GFR (eGFR) will be   calculated using the Chronic Kidney Disease Epidemiology Collaboration   (CKD-EPI) equation.           atenolol (TENORMIN) 50 MG tablet       Last Written Prescription Date:  3/7/24  Last Fill Quantity: 90,   # refills: 3  Last Office Visit: 11/29/24  lisinopril (ZESTRIL) 40 MG tablet       Last Written Prescription Date:  11/14/24  Last Fill Quantity: 90,   # refills: 3  Last Office Visit: 11/29/24

## 2025-03-26 NOTE — PROGRESS NOTES
"  Assessment & Plan     1. Hypertensive heart disease with chronic diastolic congestive heart failure (H) (Primary)  stable  - Home Care Referral  - Primary Care - Care Coordination Referral  - Lipid Profile  - Comprehensive metabolic panel  - CBC with Platelets & Differential  - TSH with free T4 reflex    2. RUMA (generalized anxiety disorder)  Continue zoloft  - Home Care Referral  - Primary Care - Care Coordination Referral    3. Stage 3a chronic kidney disease (H)  - Home Care Referral  - Primary Care - Care Coordination Referral    4. Generalized muscle weakness  - Wheelchair Order for DME - ONLY FOR DME  - Home Care Referral  - Primary Care - Care Coordination Referral    5. Unsteady gait  - Wheelchair Order for DME - ONLY FOR DME  - Home Care Referral  - Primary Care - Care Coordination Referral    6. Falls frequently  - Wheelchair Order for DME - ONLY FOR DME  - Home Care Referral  - Primary Care - Care Coordination Referral  - XR LUMBAR SPINE 2/3 VIEWS (Clinic Performed); Future  - XR Hip Right 2-3 Views (Clinic Performed); Future    7. Hip pain, right  No fracture noted, severe arthritis  - XR Hip Right 2-3 Views (Clinic Performed); Future    8. Chronic midline low back pain without sciatica  Arthritis noted.   - XR LUMBAR SPINE 2/3 VIEWS (Clinic Performed); Future    BMI  Estimated body mass index is 26.4 kg/m  as calculated from the following:    Height as of 11/29/24: 1.753 m (5' 9\").    Weight as of 11/29/24: 81.1 kg (178 lb 12.8 oz).       Follow-up as needed    The longitudinal plan of care for the diagnosis(es)/condition(s) as documented were addressed during this visit. Due to the added complexity in care, I will continue to support Aron in the subsequent management and with ongoing continuity of care.    Eunice Dias,   Certified Adult Nurse Practitioner  [phone]      Subjective   Aron is a 89 year old, presenting for the following health issues:  Heart Failure, Lipids, Anxiety, and " Kidney Problem    HPI      Hyperlipidemia Follow-Up    Are you regularly taking any medication or supplement to lower your cholesterol?   No  Are you having muscle aches or other side effects that you think could be caused by your cholesterol lowering medication?  No    Heart Failure Follow-up     Are you experiencing any shortness of breath? No per patient, family states different  Are you experiencing any swelling in your legs or feet?  Worse than usual  Are you using more pillows than usual? No  Do you cough at night?  Yes  Do you check your weight daily?  No  Have you had a weight change recently?  No  Are you having any of the following side effects from your medications? (Select all that apply)  Dizziness, Fatigue, Cough, and Swelling  Since your last visit, how many times have you gone to the cardiologist, urgent care, emergency room, or hospital because of your heart failure?   None  Last Echo: No results found.  Anxiety   How are you doing with your anxiety since your last visit? No change  Are you having other symptoms that might be associated with anxiety? No  Have you had a significant life event? Health Concerns   Are you feeling depressed? Yes:  seasonal  Do you have any concerns with your use of alcohol or other drugs? No    Social History     Tobacco Use    Smoking status: Former     Current packs/day: 0.00     Types: Cigarettes     Quit date:      Years since quittin.2     Passive exposure: Past    Smokeless tobacco: Former     Quit date: 2001   Vaping Use    Vaping status: Never Used   Substance Use Topics    Alcohol use: No    Drug use: No         3/6/2023     3:00 PM 2023     2:00 PM 2024     8:00 AM   RUMA-7 SCORE   Total Score 0 0 3         2018    11:00 AM 2018     3:00 PM 3/6/2023     3:00 PM   PHQ   PHQ-9 Total Score 0 2 0   Q9: Thoughts of better off dead/self-harm past 2 weeks Not at all Not at all Not at all         3/6/2023     3:00 PM   Last PHQ-9   1.   Little interest or pleasure in doing things 0   2.  Feeling down, depressed, or hopeless 0   3.  Trouble falling or staying asleep, or sleeping too much 0   4.  Feeling tired or having little energy 0   5.  Poor appetite or overeating 0   6.  Feeling bad about yourself 0   7.  Trouble concentrating 0   8.  Moving slowly or restless 0   Q9: Thoughts of better off dead/self-harm past 2 weeks 0   PHQ-9 Total Score 0   Difficulty at work, home, or with people Not difficult at all         7/8/2024     8:00 AM   RUMA-7    1. Feeling nervous, anxious, or on edge 0   2. Not being able to stop or control worrying 0   3. Worrying too much about different things 3   4. Trouble relaxing 0   5. Being so restless that it is hard to sit still 0   6. Becoming easily annoyed or irritable 0   7. Feeling afraid, as if something awful might happen 0   RUMA-7 Total Score 3   If you checked any problems, how difficult have they made it for you to do your work, take care of things at home, or get along with other people? Not difficult at all     Chronic Kidney Disease Follow-up    Do you take any over the counter pain medicine?: Yes  What over the counter medicine are you taking for your pain?:  Tylenol    How often do you take this medicine?:  A few times a month    Concern - walking, and getting around  Onset: 2 years, 3 weeks gotten worse  Description: hard to get up out of bed, chairs, vehicles   Intensity: severe  Progression of Symptoms:  worsening falling more hips ,and lower back   Accompanying Signs & Symptoms: reduced upper body and lower body strength   Previous history of similar problem: yes - did home PT last year which did help a bit.    Precipitating factors:        Worsened by: moving  Alleviating factors:        Improved by: nothing  Therapies tried and outcome: None  only has walker, pt would like wheelchair, chair lift, hospital bed.     He is having trouble rising from sitting to standing, walking more than 10 feet  getting in and out of vehicle - he has not left his home since last October.   He cannot get in and out of the shower at home and has been taking sponge baths.  Has been falling, last fall yesterday.  He complains of right hip and low back pain.              Documentation of Face-to-Face and Certification for Home Health Services     Patient: Enzo Colin   YOB: 1934  MR Number: [id number]  Today's Date: 3/31/2025    I certify that patient: Enzo Colin is under my care and that I, or a nurse practitioner or physician's assistant working with me, had a face-to-face encounter that meets the physician face-to-face encounter requirements with this patient on: March 31, 2025.    This encounter with the patient was in whole, or in part, for the following medical condition, which is the primary reason for home health care: heart failure, generalized weakness.    I certify that, based on my findings, the following services are medically necessary home health services: Nursing, Occupational Therapy, Physical Therapy, and Social Work.    My clinical findings support the need for the above services because: Nurse is needed: To provide assessment and oversight required in the home to assure adherence to the medical plan due to: chronic conditions that are worsening.., Occupational Therapy Services are needed to assess and treat cognitive ability and address ADL safety due to impairment in gait, strength., and Physical Therapy Services are needed to assess and treat the following functional impairments: unsteady gait, generalized weakness. .    Further, I certify that my clinical findings support that this patient is homebound (i.e. absences from home require considerable and taxing effort and are for medical reasons or [facility] services or infrequently or of short duration when for other reasons) because: Requires assistance of another person or specialized equipment to access medical services because  patient: Is unable to exit home safely on own due to: weakness., Is unable to walk greater than 10 feet without rest., and Requires supervision of another for safe transfer...    Based on the above findings. I certify that this patient is confined to the home and needs intermittent skilled nursing care, physical therapy and/or speech therapy.  The patient is under my care, and I have initiated the establishment of the plan of care.  This patient will be followed by a physician who will periodically review the plan of care.  Physician/Provider to provide follow up care: Eunice Dias    Responsible Medicare certified PECOS Physician:   Eunice Dias,   Certified Adult Nurse Practitioner  [phone]    Physician Signature: See electronic signature associated with these discharge orders.  Date: 3/31/2025       Review of Systems  Constitutional, neuro, ENT, endocrine, pulmonary, cardiac, gastrointestinal, genitourinary, musculoskeletal, integument and psychiatric systems are negative, except as otherwise noted.    Recent Labs   Lab Test 11/29/24  1406 04/10/24  1524 09/27/23  1456 07/13/23  1435 10/14/21  1036 05/17/21  0925 02/15/21  1022   LDL 97 112* 107* 98   < > 89 101*   HDL 38* 39* 42 36*   < > 37* 44   TRIG 131 122 113 181*   < > 94 128   ALT  --  10 9 15   < > 17 14   CR 1.64* 1.44* 1.59* 1.31*   < > 1.35* 1.35*   GFRESTIMATED 39* 46* 41* 52*   < > 47* 47*   GFRESTBLACK  --   --   --   --   --  54* 55*   POTASSIUM 4.4 4.9 4.5 4.7   < > 4.3 4.3   TSH 5.57* 4.27* 4.14 4.32*   < > 4.14* 4.04*    < > = values in this interval not displayed.      BP Readings from Last 3 Encounters:   03/31/25 (!) 166/72   11/29/24 134/84   07/08/24 122/62    Wt Readings from Last 3 Encounters:   11/29/24 81.1 kg (178 lb 12.8 oz)   07/08/24 81.1 kg (178 lb 12.8 oz)   06/10/24 80.6 kg (177 lb 11.2 oz)                   Objective    BP (!) 166/72 (BP Location: Left arm, Patient Position: Sitting, Cuff Size: Adult  Large)   Pulse 60   Temp 97.5  F (36.4  C) (Tympanic)   Resp 16   SpO2 98%   There is no height or weight on file to calculate BMI.  Physical Exam   GENERAL: alert, no distress, frail, and elderly  NECK: no adenopathy, no asymmetry, masses, or scars  RESP: lungs clear to auscultation - no rales, rhonchi or wheezes  CV: regular rates and rhythm, normal S1 S2, no S3 or S4, grade 3/6  murmur,  peripheral pulses strong, trace edema  MS: sitting comfortably in wheelchair, weakness in lifting legs.    PSYCH: mentation appears normal, affect normal/bright    Results for orders placed or performed in visit on 03/31/25   XR Hip Right 2-3 Views (Clinic Performed)     Status: None    Narrative    PROCEDURE:  XR HIP RIGHT 2-3 VIEWS    HISTORY: Falls frequently; Hip pain, right    COMPARISON:  2022    TECHNIQUE:  2 views of the right hip were obtained.    FINDINGS:  Severe arthritic changes are present at the right hip.  Acetabulum and right proximal femur appears intact.       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    TECHNIQUE: AP and lateral    FINDINGS: There is decrease in height in the L4-L5 and L5-S1 discs.  Anterior lateral osteophytes are seen throughout the lumbar spine.  Lower lumbar facet joint degenerative changes are noted. There is  Questionable ankylosis of the sacroiliac joint on the right.  Sacroiliac views would be helpful to confirm this. Heavy  atherosclerotic calcifications are seen in the abdominal aorta.         Impression    IMPRESSION: Moderate degenerative changes of the lumbar  kike HAIR MD         SYSTEM ID:  [id number]   Results for orders placed or performed in visit on 03/31/25   CBC with platelets and differential     Status: None   Result Value Ref Range    WBC Count 8.4 4.0 - 11.0 10e3/uL    RBC Count 4.71 4.40 - 5.90 10e6/uL    Hemoglobin 13.6 13.3 - 17.7 g/dL    Hematocrit 42.7 40.0 - 53.0 %    MCV 91 78 - 100 fL    MCH 28.9 26.5 - 33.0 pg    MCHC 31.9 31.5 - 36.5 g/dL    RDW 14.3 10.0 - 15.0 %    Platelet Count 192 150 - 450 10e3/uL    % Neutrophils 79 %    % Lymphocytes 11 %    % Monocytes 8 %    % Eosinophils 1 %    % Basophils 0 %    % Immature Granulocytes 0 %    Absolute Neutrophils 6.6 1.6 - 8.3 10e3/uL    Absolute Lymphocytes 1.0 0.8 - 5.3 10e3/uL    Absolute Monocytes 0.7 0.0 - 1.3 10e3/uL    Absolute Eosinophils 0.1 0.0 - 0.7 10e3/uL    Absolute Basophils 0.0 0.0 - 0.2 10e3/uL    Absolute Immature Granulocytes 0.0 <=0.4 10e3/uL   CBC with Platelets & Differential     Status: None    Narrative    The following orders were created for panel order CBC with Platelets & Differential.  Procedure                               Abnormality         Status                     ---------                               -----------         ------                     CBC with platelets and ...[[id number]]                      Final result                 Please view results for these tests on the individual orders.             Signed Electronically by: Eunice Dias NP

## 2025-03-31 ENCOUNTER — OFFICE VISIT (OUTPATIENT)
Dept: FAMILY MEDICINE | Facility: OTHER | Age: OVER 89
End: 2025-03-31
Attending: NURSE PRACTITIONER
Payer: MEDICARE

## 2025-03-31 ENCOUNTER — ANCILLARY PROCEDURE (OUTPATIENT)
Dept: GENERAL RADIOLOGY | Facility: OTHER | Age: OVER 89
End: 2025-03-31
Attending: NURSE PRACTITIONER
Payer: MEDICARE

## 2025-03-31 VITALS
SYSTOLIC BLOOD PRESSURE: 166 MMHG | RESPIRATION RATE: 16 BRPM | HEART RATE: 60 BPM | OXYGEN SATURATION: 98 % | DIASTOLIC BLOOD PRESSURE: 72 MMHG | TEMPERATURE: 97.5 F

## 2025-03-31 DIAGNOSIS — M54.50 CHRONIC MIDLINE LOW BACK PAIN WITHOUT SCIATICA: ICD-10-CM

## 2025-03-31 DIAGNOSIS — I11.0 HYPERTENSIVE HEART DISEASE WITH CHRONIC DIASTOLIC CONGESTIVE HEART FAILURE (H): Primary | ICD-10-CM

## 2025-03-31 DIAGNOSIS — M25.551 HIP PAIN, RIGHT: ICD-10-CM

## 2025-03-31 DIAGNOSIS — G89.29 CHRONIC MIDLINE LOW BACK PAIN WITHOUT SCIATICA: ICD-10-CM

## 2025-03-31 DIAGNOSIS — N18.31 STAGE 3A CHRONIC KIDNEY DISEASE (H): ICD-10-CM

## 2025-03-31 DIAGNOSIS — F41.1 GAD (GENERALIZED ANXIETY DISORDER): ICD-10-CM

## 2025-03-31 DIAGNOSIS — I50.32 HYPERTENSIVE HEART DISEASE WITH CHRONIC DIASTOLIC CONGESTIVE HEART FAILURE (H): Primary | ICD-10-CM

## 2025-03-31 DIAGNOSIS — R29.6 FALLS FREQUENTLY: ICD-10-CM

## 2025-03-31 DIAGNOSIS — R26.81 UNSTEADY GAIT: ICD-10-CM

## 2025-03-31 DIAGNOSIS — M62.81 GENERALIZED MUSCLE WEAKNESS: ICD-10-CM

## 2025-03-31 LAB
ALBUMIN SERPL BCG-MCNC: 4 G/DL (ref 3.5–5.2)
ALP SERPL-CCNC: 86 U/L (ref 40–150)
ALT SERPL W P-5'-P-CCNC: 8 U/L (ref 0–70)
ANION GAP SERPL CALCULATED.3IONS-SCNC: 10 MMOL/L (ref 7–15)
AST SERPL W P-5'-P-CCNC: 29 U/L (ref 0–45)
BASOPHILS # BLD AUTO: 0 10E3/UL (ref 0–0.2)
BASOPHILS NFR BLD AUTO: 0 %
BILIRUB SERPL-MCNC: 0.6 MG/DL
BUN SERPL-MCNC: 30.7 MG/DL (ref 8–23)
CALCIUM SERPL-MCNC: 9.5 MG/DL (ref 8.8–10.4)
CHLORIDE SERPL-SCNC: 104 MMOL/L (ref 98–107)
CHOLEST SERPL-MCNC: 150 MG/DL
CREAT SERPL-MCNC: 1.41 MG/DL (ref 0.67–1.17)
EGFRCR SERPLBLD CKD-EPI 2021: 47 ML/MIN/1.73M2
EOSINOPHIL # BLD AUTO: 0.1 10E3/UL (ref 0–0.7)
EOSINOPHIL NFR BLD AUTO: 1 %
ERYTHROCYTE [DISTWIDTH] IN BLOOD BY AUTOMATED COUNT: 14.3 % (ref 10–15)
FASTING STATUS PATIENT QL REPORTED: NO
FASTING STATUS PATIENT QL REPORTED: NO
GLUCOSE SERPL-MCNC: 93 MG/DL (ref 70–99)
HCO3 SERPL-SCNC: 25 MMOL/L (ref 22–29)
HCT VFR BLD AUTO: 42.7 % (ref 40–53)
HDLC SERPL-MCNC: 40 MG/DL
HGB BLD-MCNC: 13.6 G/DL (ref 13.3–17.7)
IMM GRANULOCYTES # BLD: 0 10E3/UL
IMM GRANULOCYTES NFR BLD: 0 %
LDLC SERPL CALC-MCNC: 91 MG/DL
LYMPHOCYTES # BLD AUTO: 1 10E3/UL (ref 0.8–5.3)
LYMPHOCYTES NFR BLD AUTO: 11 %
MCH RBC QN AUTO: 28.9 PG (ref 26.5–33)
MCHC RBC AUTO-ENTMCNC: 31.9 G/DL (ref 31.5–36.5)
MCV RBC AUTO: 91 FL (ref 78–100)
MONOCYTES # BLD AUTO: 0.7 10E3/UL (ref 0–1.3)
MONOCYTES NFR BLD AUTO: 8 %
NEUTROPHILS # BLD AUTO: 6.6 10E3/UL (ref 1.6–8.3)
NEUTROPHILS NFR BLD AUTO: 79 %
NONHDLC SERPL-MCNC: 110 MG/DL
PLATELET # BLD AUTO: 192 10E3/UL (ref 150–450)
POTASSIUM SERPL-SCNC: 5.5 MMOL/L (ref 3.4–5.3)
PROT SERPL-MCNC: 7.2 G/DL (ref 6.4–8.3)
RBC # BLD AUTO: 4.71 10E6/UL (ref 4.4–5.9)
SODIUM SERPL-SCNC: 139 MMOL/L (ref 135–145)
T4 FREE SERPL-MCNC: 1.09 NG/DL (ref 0.9–1.7)
TRIGL SERPL-MCNC: 94 MG/DL
TSH SERPL DL<=0.005 MIU/L-ACNC: 6.71 UIU/ML (ref 0.3–4.2)
WBC # BLD AUTO: 8.4 10E3/UL (ref 4–11)

## 2025-03-31 PROCEDURE — 1125F AMNT PAIN NOTED PAIN PRSNT: CPT | Performed by: NURSE PRACTITIONER

## 2025-03-31 PROCEDURE — 3077F SYST BP >= 140 MM HG: CPT | Performed by: NURSE PRACTITIONER

## 2025-03-31 PROCEDURE — 84155 ASSAY OF PROTEIN SERUM: CPT | Mod: ZL | Performed by: NURSE PRACTITIONER

## 2025-03-31 PROCEDURE — 85014 HEMATOCRIT: CPT | Mod: ZL | Performed by: NURSE PRACTITIONER

## 2025-03-31 PROCEDURE — 85004 AUTOMATED DIFF WBC COUNT: CPT | Mod: ZL | Performed by: NURSE PRACTITIONER

## 2025-03-31 PROCEDURE — 82310 ASSAY OF CALCIUM: CPT | Mod: ZL | Performed by: NURSE PRACTITIONER

## 2025-03-31 PROCEDURE — 99214 OFFICE O/P EST MOD 30 MIN: CPT | Performed by: NURSE PRACTITIONER

## 2025-03-31 PROCEDURE — 36415 COLL VENOUS BLD VENIPUNCTURE: CPT | Mod: ZL | Performed by: NURSE PRACTITIONER

## 2025-03-31 PROCEDURE — 84075 ASSAY ALKALINE PHOSPHATASE: CPT | Mod: ZL | Performed by: NURSE PRACTITIONER

## 2025-03-31 PROCEDURE — 82465 ASSAY BLD/SERUM CHOLESTEROL: CPT | Mod: ZL | Performed by: NURSE PRACTITIONER

## 2025-03-31 PROCEDURE — G2211 COMPLEX E/M VISIT ADD ON: HCPCS | Performed by: NURSE PRACTITIONER

## 2025-03-31 PROCEDURE — 84443 ASSAY THYROID STIM HORMONE: CPT | Mod: ZL | Performed by: NURSE PRACTITIONER

## 2025-03-31 PROCEDURE — 84439 ASSAY OF FREE THYROXINE: CPT | Mod: ZL | Performed by: NURSE PRACTITIONER

## 2025-03-31 PROCEDURE — 73502 X-RAY EXAM HIP UNI 2-3 VIEWS: CPT | Mod: TC,FY

## 2025-03-31 PROCEDURE — 3078F DIAST BP <80 MM HG: CPT | Performed by: NURSE PRACTITIONER

## 2025-03-31 PROCEDURE — 72100 X-RAY EXAM L-S SPINE 2/3 VWS: CPT | Mod: TC,FY

## 2025-03-31 PROCEDURE — G0463 HOSPITAL OUTPT CLINIC VISIT: HCPCS | Performed by: NURSE PRACTITIONER

## 2025-03-31 ASSESSMENT — PAIN SCALES - GENERAL: PAINLEVEL_OUTOF10: MODERATE PAIN (4)

## 2025-04-02 ENCOUNTER — TELEPHONE (OUTPATIENT)
Dept: FAMILY MEDICINE | Facility: OTHER | Age: OVER 89
End: 2025-04-02

## 2025-04-02 ENCOUNTER — PATIENT OUTREACH (OUTPATIENT)
Dept: CARE COORDINATION | Facility: OTHER | Age: OVER 89
End: 2025-04-02

## 2025-04-02 NOTE — PROGRESS NOTES
Clinic Care Coordination Contact  Care Team Conversations    RN CC had discussion with PCP Marlon Dias NP regarding care coordination referral.  Marlon states patient has had some falls at home and his wife and daughter are not able to assist patient up when he is on the floor.  Discussed additional support in home as patient is not wanting to leave his home at this time.  Recently had [facility] in home in June 2024.  Marlon placed Home Care Referral.

## 2025-04-02 NOTE — TELEPHONE ENCOUNTER
2:35 PM    Reason for Call: Phone Call    Description: Luana w/ [facility] called stating they received a referral for home care but patient is currently in the hospital and it looks like he will be going into short term care after his release.     Was an appointment offered for this call? No  If yes : Appointment type              Date    Preferred method for responding to this message: Telephone Call  What is your phone number ? [phone]    If we cannot reach you directly, may we leave a detailed response at the number you provided? Yes    Can this message wait until your PCP/provider returns, if available today? Not applicable    Tonia Santos

## 2025-04-03 ENCOUNTER — TELEPHONE (OUTPATIENT)
Dept: FAMILY MEDICINE | Facility: OTHER | Age: OVER 89
End: 2025-04-03

## 2025-04-03 NOTE — TELEPHONE ENCOUNTER
Telephone call returned to patient.  Wife and daughter state that they did not call to schedule.  Hospital staff may have called for discharge appointment.  Explained that patient will be follow by a provider at [facility] while he is staying there and Marlon would not need to see him for hospital follow up.  Daughter and wife verbalize understanding and state he has been there before and they understand how it works. Daughter states she does not know how long he will be there.  They plan to assess him and make those recommendations later.  Advised to follow up with Marlon Beard NP upon discharge from [facility].

## 2025-04-03 NOTE — TELEPHONE ENCOUNTER
Care Everywhere update done-    Patient admitted to [facility]  Diagnosis:  Fall, initial encounter (Primary Dx);   Gait instability;   Failure to thrive in adult;   Acute cystitis without hematuria;   MICHEAL (acute kidney injury) (HCC)     Plan is to discharge to [facility] on 4/3/25.

## 2025-05-31 ENCOUNTER — MEDICAL CORRESPONDENCE (OUTPATIENT)
Dept: HEALTH INFORMATION MANAGEMENT | Facility: CLINIC | Age: OVER 89
End: 2025-05-31

## 2025-05-31 DIAGNOSIS — Z53.9 PERSONS ENCOUNTERING HEALTH SERVICES FOR SPECIFIC PROCEDURES, NOT CARRIED OUT: Primary | ICD-10-CM

## 2025-05-31 PROCEDURE — G0180 MD CERTIFICATION HHA PATIENT: HCPCS | Performed by: NURSE PRACTITIONER

## 2025-06-02 ENCOUNTER — TELEPHONE (OUTPATIENT)
Dept: FAMILY MEDICINE | Facility: OTHER | Age: OVER 89
End: 2025-06-02

## 2025-06-02 DIAGNOSIS — M62.81 GENERALIZED MUSCLE WEAKNESS: Primary | ICD-10-CM

## 2025-06-02 NOTE — TELEPHONE ENCOUNTER
11:30 AM    Reason for Call: Phone Call    Description: Courtney, patient's daughter calling. Inquiring about getting a lift chair for patient. Highly recommended by Skilled Nursing.   Please call Courtney back  (Consent to Communicate on File)    Was an appointment offered for this call? No  If yes : Appointment type              Date    Preferred method for responding to this message: Telephone Call  What is your phone number ? [phone]    If we cannot reach you directly, may we leave a detailed response at the number you provided? Yes    Can this message wait until your PCP/provider returns, if available today? Not applicable    Geraldine Ca

## 2025-06-02 NOTE — TELEPHONE ENCOUNTER
Symptom or reason needing to speak to RN: Tricia lua [facility] calling for Verbal orders skilled nursing 1xper week for 9 weeks.     Best number to return call: [phone]      Best time to return call: Anytime

## 2025-06-03 ENCOUNTER — TELEPHONE (OUTPATIENT)
Dept: CARE COORDINATION | Facility: OTHER | Age: OVER 89
End: 2025-06-03

## 2025-06-03 NOTE — TELEPHONE ENCOUNTER
Call from Ember with [facility] requesting verbal orders for Start of Home Care Services - OT 1 x per week x 4 weeks.     Call back number [phone].

## 2025-06-03 NOTE — TELEPHONE ENCOUNTER
Attempt # 1  Outcome: Left Message   Comment: LVM for daughter to call me back directly so I can touch base with her on this order and possible appointment needed.

## 2025-06-04 ENCOUNTER — TELEPHONE (OUTPATIENT)
Dept: FAMILY MEDICINE | Facility: OTHER | Age: OVER 89
End: 2025-06-04

## 2025-06-04 NOTE — TELEPHONE ENCOUNTER
"11:05 AM    Reason for Call: Phone Call / Possible Overbook     Description: Patient's daughter Courtney calling. Patient is scheduled for next available with PCP in July for Office Visit to discuss Lift Chair but Courtney is wondering if he could be seen the same day as his Wife's Podiatry appointment in [place] if possible (6/18/25 @12:15pm).   Courtney further states that his HomeCare Nurses are calling the chair he has now a \"hazard\" and are hoping an appointment for this chair can happen sooner than later due to the hazard of current one.  Courtney is also wondering if a note can be added to patient's chart to show that patient has Dementia and when calling to confirm appointments, he has been known to cancel the appointments without anyone's knowledge. Did state that if Wife answers- that's OK, she won't cancel.   Please call Courtney back   (Consent to Communicate is on file)      Was an appointment offered for this call? Yes  If yes : Appointment type : Office Visit              Date : 7/9/25    Preferred method for responding to this message: Telephone Call  What is your phone number ? [phone]    If we cannot reach you directly, may we leave a detailed response at the number you provided? Yes    Can this message wait until your PCP/provider returns, if available today? Not applicable    Geraldine Ca  "

## 2025-06-05 ENCOUNTER — TELEPHONE (OUTPATIENT)
Dept: FAMILY MEDICINE | Facility: OTHER | Age: OVER 89
End: 2025-06-05

## 2025-06-05 NOTE — TELEPHONE ENCOUNTER
Call returned to [facility] no answer, message left on secure voicemail ok for verbal orders for PT 1 x per week for 8 weeks per Eunice Negrete NP.

## 2025-06-05 NOTE — TELEPHONE ENCOUNTER
1:26 PM    Reason for Call: Phone Call / Verbal Orders Request    Description: Oralia a Physical Therapist with [facility] calling to request verbal orders from PCP.  Needing: PT 1x a week for 8 weeks, effective today for fall prevention and strengthening.   Please call Oralia back         Preferred method for responding to this message: Telephone Call  What is your phone number ? [phone]    If we cannot reach you directly, may we leave a detailed response at the number you provided? Yes    Can this message wait until your PCP/provider returns, if available today? Not applicable    Geraldine Ca

## 2025-06-11 NOTE — PROGRESS NOTES
"  1. Hypertensive heart disease with chronic diastolic congestive heart failure (H) (Primary)  stable  - Lift Chair W Seat Lift Mechanism Order for DME - ONLY FOR DME    2. Generalized muscle weakness  Weakness remains  Continue home physical therapy/occupational therapy.    - Lift Chair W Seat Lift Mechanism Order for DME - ONLY FOR DME    3. Falls frequently  - Lift Chair W Seat Lift Mechanism Order for DME - ONLY FOR DME    4. Unsteady gait  - Lift Chair W Seat Lift Mechanism Order for DME - ONLY FOR DME    5. Chronic midline low back pain without sciatica  - Lift Chair W Seat Lift Mechanism Order for DME - ONLY FOR DME      Subjective   Aron is a 89 year old, presenting for the following health issues:  Musculoskeletal Problem          Concern - face to face for lift chair   Onset: 4 years  Description: hard to get up from a chair needs a DME order for a lift chair   Intensity: mild  Progression of Symptoms:  same  Accompanying Signs & Symptoms: weakness in legs, difficulty rising from sitting to standing in all chairs that he has in his home.  Has had several falls in the home.  He is able to ambulate with a walker once standing.  He would benefit from a lift chair for his safety.  Has arthritis of hips and spine.    Previous history of similar problem: none  Precipitating factors:        Worsened by: none  Alleviating factors:        Improved by: none  Therapies tried and outcome: None        Review of Systems  Constitutional, HEENT, cardiovascular, pulmonary, GI, , musculoskeletal, neuro, skin, endocrine and psych systems are negative, except as otherwise noted.      Objective    BP (!) 156/79 (BP Location: Left arm, Patient Position: Sitting, Cuff Size: Adult Regular)   Pulse 71   Temp 96.8  F (36  C) (Tympanic)   Resp 16   Ht 1.753 m (5' 9\")   Wt 81.2 kg (179 lb)   SpO2 97%   BMI 26.43 kg/m    Body mass index is 26.43 kg/m .  Physical Exam   GENERAL: alert, no distress, frail, and elderly  RESP: " lungs clear to auscultation - no rales, rhonchi or wheezes  CV: regular rate and rhythm, normal S1 S2, no S3 or S4, no murmur  PSYCH: mentation appears normal, affect normal/bright            Signed Electronically by: Eunice Dias, NP

## 2025-06-18 ENCOUNTER — OFFICE VISIT (OUTPATIENT)
Dept: FAMILY MEDICINE | Facility: OTHER | Age: OVER 89
End: 2025-06-18
Attending: NURSE PRACTITIONER
Payer: MEDICARE

## 2025-06-18 VITALS
SYSTOLIC BLOOD PRESSURE: 156 MMHG | OXYGEN SATURATION: 97 % | RESPIRATION RATE: 16 BRPM | DIASTOLIC BLOOD PRESSURE: 79 MMHG | HEIGHT: 69 IN | TEMPERATURE: 96.8 F | BODY MASS INDEX: 26.51 KG/M2 | HEART RATE: 71 BPM | WEIGHT: 179 LBS

## 2025-06-18 DIAGNOSIS — M16.0 ARTHRITIS OF BOTH HIPS: ICD-10-CM

## 2025-06-18 DIAGNOSIS — G89.29 CHRONIC MIDLINE LOW BACK PAIN WITHOUT SCIATICA: ICD-10-CM

## 2025-06-18 DIAGNOSIS — I11.0 HYPERTENSIVE HEART DISEASE WITH CHRONIC DIASTOLIC CONGESTIVE HEART FAILURE (H): Primary | ICD-10-CM

## 2025-06-18 DIAGNOSIS — R26.81 UNSTEADY GAIT: ICD-10-CM

## 2025-06-18 DIAGNOSIS — M62.81 GENERALIZED MUSCLE WEAKNESS: ICD-10-CM

## 2025-06-18 DIAGNOSIS — R29.6 FALLS FREQUENTLY: ICD-10-CM

## 2025-06-18 DIAGNOSIS — I50.32 HYPERTENSIVE HEART DISEASE WITH CHRONIC DIASTOLIC CONGESTIVE HEART FAILURE (H): Primary | ICD-10-CM

## 2025-06-18 DIAGNOSIS — M54.50 CHRONIC MIDLINE LOW BACK PAIN WITHOUT SCIATICA: ICD-10-CM

## 2025-06-18 DIAGNOSIS — M47.816 ARTHRITIS, LUMBAR SPINE: ICD-10-CM

## 2025-06-18 PROCEDURE — G0463 HOSPITAL OUTPT CLINIC VISIT: HCPCS

## 2025-06-18 ASSESSMENT — PAIN SCALES - GENERAL: PAINLEVEL_OUTOF10: NO PAIN (0)

## 2025-07-29 ENCOUNTER — TELEPHONE (OUTPATIENT)
Dept: FAMILY MEDICINE | Facility: OTHER | Age: OVER 89
End: 2025-07-29

## 2025-07-29 NOTE — TELEPHONE ENCOUNTER
Call from MARCELLA Null with [facility] requesting verbal orders for continuation of PT   every other week for 8 weeks, notified ok for verbal orders per Eunice Negrete NP.

## 2025-07-30 DIAGNOSIS — Z53.9 PERSONS ENCOUNTERING HEALTH SERVICES FOR SPECIFIC PROCEDURES, NOT CARRIED OUT: Primary | ICD-10-CM

## 2025-07-30 PROCEDURE — G0179 MD RECERTIFICATION HHA PT: HCPCS | Performed by: NURSE PRACTITIONER
